# Patient Record
Sex: FEMALE | Race: BLACK OR AFRICAN AMERICAN | Employment: FULL TIME | URBAN - METROPOLITAN AREA
[De-identification: names, ages, dates, MRNs, and addresses within clinical notes are randomized per-mention and may not be internally consistent; named-entity substitution may affect disease eponyms.]

---

## 2017-01-18 ENCOUNTER — OFFICE VISIT (OUTPATIENT)
Dept: FAMILY MEDICINE CLINIC | Age: 52
End: 2017-01-18

## 2017-01-18 VITALS
BODY MASS INDEX: 43.55 KG/M2 | DIASTOLIC BLOOD PRESSURE: 86 MMHG | RESPIRATION RATE: 12 BRPM | WEIGHT: 245.8 LBS | HEART RATE: 90 BPM | OXYGEN SATURATION: 98 % | TEMPERATURE: 98.6 F | HEIGHT: 63 IN | SYSTOLIC BLOOD PRESSURE: 144 MMHG

## 2017-01-18 DIAGNOSIS — E78.00 PURE HYPERCHOLESTEROLEMIA: ICD-10-CM

## 2017-01-18 DIAGNOSIS — E66.01 OBESITY, CLASS III, BMI 40-49.9 (MORBID OBESITY) (HCC): ICD-10-CM

## 2017-01-18 DIAGNOSIS — I10 ESSENTIAL HYPERTENSION, BENIGN: Primary | ICD-10-CM

## 2017-01-18 DIAGNOSIS — G62.89 OTHER POLYNEUROPATHY: ICD-10-CM

## 2017-01-18 DIAGNOSIS — J45.21 MILD INTERMITTENT ASTHMA WITH ACUTE EXACERBATION: Chronic | ICD-10-CM

## 2017-01-18 RX ORDER — RANITIDINE 150 MG/1
150 TABLET, FILM COATED ORAL 2 TIMES DAILY
COMMUNITY
End: 2017-07-18 | Stop reason: SDUPTHER

## 2017-01-18 RX ORDER — LEVALBUTEROL INHALATION SOLUTION 1.25 MG/3ML
1.25 SOLUTION RESPIRATORY (INHALATION)
Qty: 3 ML | Refills: 0
Start: 2017-01-18 | End: 2017-01-18

## 2017-01-18 RX ORDER — AMLODIPINE BESYLATE 5 MG/1
5 TABLET ORAL DAILY
Qty: 90 TAB | Refills: 3 | Status: SHIPPED | OUTPATIENT
Start: 2017-01-18 | End: 2017-04-19 | Stop reason: SDUPTHER

## 2017-01-18 RX ORDER — ALBUTEROL SULFATE 90 UG/1
1-2 AEROSOL, METERED RESPIRATORY (INHALATION)
Qty: 1 INHALER | Refills: 2 | Status: SHIPPED | OUTPATIENT
Start: 2017-01-18

## 2017-01-18 RX ORDER — AA/PROT/LYSINE/METHIO/VIT C/B6 50-12.5 MG
TABLET ORAL
COMMUNITY
End: 2020-09-14 | Stop reason: ALTCHOICE

## 2017-01-18 RX ORDER — FLUTICASONE FUROATE AND VILANTEROL 100; 25 UG/1; UG/1
1 POWDER RESPIRATORY (INHALATION) DAILY
Qty: 1 INHALER | Refills: 5 | Status: SHIPPED | OUTPATIENT
Start: 2017-01-18 | End: 2020-04-20

## 2017-01-18 NOTE — PROGRESS NOTES
1. Have you been to the ER, urgent care clinic since your last visit? Hospitalized since your last visit? No    2. Have you seen or consulted any other health care providers outside of the [facility] since your last visit? Include any pap smears or colon screening.  Farida Coffman- twice weekly    Chief Complaint   Patient presents with    Hypertension    Cough     producing a cloudy white mucus    Leg Pain     left leg and both feet

## 2017-01-18 NOTE — PROGRESS NOTES
HISTORY OF PRESENT ILLNESS  Moshe Fleischer is a 46 y.o. female. HPI   Cardiovascular Review:  The patient has hypertension, hyperlipidemia and obesity. Diet and Lifestyle: generally follows a low fat low cholesterol diet, generally follows a low sodium diet, exercises sporadically, nonsmoker  Home BP Monitoring: is not measured at home. Pertinent ROS: taking medications as instructed, no medication side effects noted, no TIA's, no chest pain on exertion, no dyspnea on exertion, no swelling of ankles. Patient reports new onset of neuropathy of her feet. Patient seen routinely by GYN, Dr. Lewis Lechuga. Asthma  Patient presents for evaluation of wheezing, non-productive cough and chest tightness. The patient has been previously diagnosed with asthma. Symptoms began today. Observed precipitants include fumes and smoke. Patient does not have Albuterol inhaler. Review of Systems   Constitutional: Negative for weight loss. Cardiovascular: Negative for leg swelling. Gastrointestinal: Negative for constipation and diarrhea. Genitourinary: Positive for frequency and urgency. Musculoskeletal: Negative for joint pain and myalgias. Neurological: Negative for weakness. Physical Exam   Constitutional: She is oriented to person, place, and time. She appears well-developed and well-nourished. Neck: Normal range of motion. Neck supple. No JVD present. Carotid bruit is not present. No thyromegaly present. Cardiovascular: Normal rate, regular rhythm and intact distal pulses. Exam reveals no gallop and no friction rub. No murmur heard. Pulmonary/Chest: Effort normal. No respiratory distress. She has wheezes (expiratory). Musculoskeletal: She exhibits no edema. Lymphadenopathy:     She has no cervical adenopathy. Neurological: She is alert and oriented to person, place, and time. Psychiatric: She has a normal mood and affect.  Her behavior is normal.   Nursing note and vitals reviewed. ASSESSMENT and PLAN  Amber Mercedes was seen today for hypertension, cough and leg pain. Diagnoses and all orders for this visit:    Essential hypertension, benign  Borderline control. Continue home monitoring and call for reading >140/90  -     amLODIPine (NORVASC) 5 mg tablet; Take 1 Tab by mouth daily.  -     CBC W/O DIFF  -     METABOLIC PANEL, COMPREHENSIVE    Pure hypercholesterolemia  -     LIPID PANEL    Obesity, Class III, BMI 40-49.9 (morbid obesity) ([facility]  Discussed need for weight loss through diet and exercise. Reviewed decreased caloric intake and increased activity. Mild intermittent asthma with acute exacerbation  Xopenex treatment given in clinic with relief. Begin Breo daily. -     fluticasone-vilanterol (BREO ELLIPTA) 100-25 mcg/dose inhaler; Take 1 Puff by inhalation daily. -     albuterol (PROVENTIL HFA, VENTOLIN HFA, PROAIR HFA) 90 mcg/actuation inhaler; Take 1-2 Puffs by inhalation every four (4) hours as needed for Wheezing or Shortness of Breath. Other polyneuropathy ([facility]  -     HEMOGLOBIN A1C W/O EAG  -     VITAMIN B12      I have discussed the diagnosis with the patient and the intended plan as seen in the above orders. The patient has received an after-visit summary along with patient information handout. I have discussed medication side effects and warnings with the patient as well. Follow-up Disposition:  Return in about 6 months (around 7/18/2017) for cholesterol and asthma.

## 2017-01-18 NOTE — PATIENT INSTRUCTIONS

## 2017-01-18 NOTE — MR AVS SNAPSHOT
Visit Information Date & Time Provider Department Dept. Phone Encounter #  
 1/18/2017  2:30 PM Governor Jatin  [facility] [phone] [id number] Follow-up Instructions Return in about 6 months (around 7/18/2017) for cholesterol and asthma. Upcoming Health Maintenance Date Due  
 BREAST CANCER SCRN MAMMOGRAM 9/30/2015 PAP AKA CERVICAL CYTOLOGY 9/8/2017 DTaP/Tdap/Td series (2 - Td) 1/18/2021 COLONOSCOPY 11/11/2023 Allergies as of 1/18/2017  Review Complete On: 1/18/2017 By: Governor Jatin NP Severity Noted Reaction Type Reactions Latex  11/24/2014    Swelling Swelling in face when latex gloves were used at dentist office Other Food  01/30/2015    Itching Peas, raw broccoli, raw cauliflower, croutons Hydrocodone-acetaminophen High 02/04/2015   Side Effect Itching Ultracet [Tramadol-acetaminophen] High 01/05/2010    Swelling Facial and mouth Biaxin [Clarithromycin]    Itching, Nausea Only Carrot  01/30/2015    Itching Codeine  09/18/2012    Itching Dilaudid [Hydromorphone]  09/07/2012    Itching, Nausea Only Flexeril [Cyclobenzaprine]  02/29/2012    Itching Naproxen    Rash Nucynta [Tapentadol]  02/13/2015   Side Effect Itching Oxycodone  01/30/2015    Swelling Swelling of hands Pcn [Penicillins]    Rash Peanut  01/30/2015    Shortness of Breath, Swelling All nuts Red Dye  01/30/2015    Itching, Swelling #9 Shellfish Derived  01/30/2015    Swelling  
 shrimp Strawberry  01/30/2015    Itching Tramadol  02/29/2012    Hives Current Immunizations  Reviewed on 7/13/2016 Name Date Influenza Vaccine 11/3/2014 TDAP Vaccine 1/18/2011 Varicella Virus Vaccine 10/1/2013 Not reviewed this visit You Were Diagnosed With   
  
 Codes Comments Essential hypertension, benign    -  Primary ICD-10-CM: I10 
ICD-9-CM: 401.1 Pure hypercholesterolemia     ICD-10-CM: E78.00 ICD-9-CM: 272.0 Obesity, Class III, BMI 40-49.9 (morbid obesity) (HCC)     ICD-10-CM: E66.01 
ICD-9-CM: 278.01 Mild intermittent asthma with acute exacerbation     ICD-10-CM: J45.21 ICD-9-CM: 534.07 Other polyneuropathy ([facility]     ICD-10-CM: G62.89 Vitals BP Pulse Temp Resp Height(growth percentile) Weight(growth percentile) 144/86 (BP 1 Location: Left arm, BP Patient Position: Sitting) 90 98.6 °F (37 °C) (Oral) 12 5' 3\" (1.6 m) 245 lb 12.8 oz (111.5 kg) SpO2 BMI OB Status Smoking Status 98% 43.54 kg/m2 IUD Never Smoker Vitals History BMI and BSA Data Body Mass Index Body Surface Area 43.54 kg/m 2 2.23 m 2 Preferred Pharmacy Pharmacy Name Phone [facility] [address] [phone] Your Updated Medication List  
  
   
This list is accurate as of: 1/18/17  3:21 PM.  Always use your most recent med list.  
  
  
  
  
 albuterol 90 mcg/actuation inhaler Commonly known as:  PROVENTIL HFA, VENTOLIN HFA, PROAIR HFA Take 1-2 Puffs by inhalation every four (4) hours as needed for Wheezing or Shortness of Breath. ALLER-FERNANDA 10 mg tablet Generic drug:  cetirizine Take 10 mg by mouth daily. amLODIPine 5 mg tablet Commonly known as:  Hein Zaheer Take 1 Tab by mouth daily. aspirin 81 mg tablet Take 81 mg by mouth daily. CO Q-10 10 mg Cap Generic drug:  coenzyme q10 Take  by mouth. EPIPEN 2-WAYNE 0.3 mg/0.3 mL injection Generic drug:  EPINEPHrine  
0.3 mL by IntraMUSCular route once as needed for up to 1 dose. fluticasone-vilanterol 100-25 mcg/dose inhaler Commonly known as:  BREO ELLIPTA Take 1 Puff by inhalation daily. levalbuterol 1.25 mg/3 mL Nebu Commonly known as:  XOPENEX  
3 mL by Nebulization route now for 1 dose. multivitamin tablet Commonly known as:  ONE A DAY  
 Take 1 Tab by mouth daily. omeprazole 20 mg tablet Commonly known as:  PRILOSEC OTC Take 20 mg by mouth daily. solifenacin 5 mg tablet Commonly known as:  Lebanese Jailyn Take 1 Tab by mouth daily. For bladder  Indications: URINARY URGE INCONTINENCE  
  
 VITAMIN C 500 mg tablet Generic drug:  ascorbic acid (vitamin C) Take 500 mg by mouth daily. VITAMIN D3 1,000 unit Cap Generic drug:  cholecalciferol Take 1 capsule by mouth daily. ZANTAC 150 mg tablet Generic drug:  raNITIdine Take 150 mg by mouth two (2) times a day. zinc 15 mg Tab Take  by mouth daily. Prescriptions Sent to Pharmacy Refills  
 amLODIPine (NORVASC) 5 mg tablet 3 Sig: Take 1 Tab by mouth daily. Class: Normal  
 Pharmacy: [address] Ph #: [phone] Route: Oral  
 fluticasone-vilanterol (BREO ELLIPTA) 100-25 mcg/dose inhaler 5 Sig: Take 1 Puff by inhalation daily. Class: Normal  
 Pharmacy: [address] Ph #: [phone] Route: Inhalation  
 albuterol (PROVENTIL HFA, VENTOLIN HFA, PROAIR HFA) 90 mcg/actuation inhaler 2 Sig: Take 1-2 Puffs by inhalation every four (4) hours as needed for Wheezing or Shortness of Breath. Class: Normal  
 Pharmacy: [address] Ph #: [phone] Route: Inhalation We Performed the Following CBC W/O DIFF [29426 CPT(R)] HEMOGLOBIN A1C W/O EAG [53081 CPT(R)] LEVALBUTEROL, INHAL. SOL., FDA-APPROVED FINAL, NON-COMPOUND UNIT DOSE, 0.5 MG [ [facility]] LIPID PANEL [37879 CPT(R)] METABOLIC PANEL, COMPREHENSIVE [51835 CPT(R)] SC INHAL RX, AIRWAY OBST/DX SPUTUM INDUCT [id number] CPT(R)] VITAMIN B12 [id number] CPT(R)] Follow-up Instructions Return in about 6 months (around 7/18/2017) for cholesterol and asthma. Patient Instructions Asthma Attack: Care Instructions Your Care Instructions During an asthma attack, the airways swell and narrow. This makes it hard to breathe. Severe asthma attacks can be life-threatening, but you can help prevent them by keeping your asthma under control and treating symptoms before they get bad. Symptoms include being short of breath, having chest tightness, coughing, and wheezing. Noting and treating these symptoms can also help you avoid future trips to the emergency room. The doctor has checked you carefully, but problems can develop later. If you notice any problems or new symptoms, get medical treatment right away. Follow-up care is a key part of your treatment and safety. Be sure to make and go to all appointments, and call your doctor if you are having problems. It's also a good idea to know your test results and keep a list of the medicines you take. How can you care for yourself at home? · Follow your asthma action plan to prevent and treat attacks. If you don't have an asthma action plan, work with your doctor to create one. · Take your asthma medicines exactly as prescribed. Talk to your doctor right away if you have any questions about how to take them. ¨ Use your quick-relief medicine when you have symptoms of an attack. Quick-relief medicine is usually an albuterol inhaler. Some people need to use quick-relief medicine before they exercise. ¨ Take your controller medicine every day, not just when you have symptoms. Controller medicine is usually an inhaled corticosteroid. The goal is to prevent problems before they occur. Don't use your controller medicine to treat an attack that has already started. It doesn't work fast enough to help. ¨ If your doctor prescribed corticosteroid pills to use during an attack, take them exactly as prescribed. It may take hours for the pills to work, but they may make the episode shorter and help you breathe better. ¨ Keep your quick-relief medicine with you at all times. · Talk to your doctor before using other medicines. Some medicines, such as aspirin, can cause asthma attacks in some people. · If you have a peak flow meter, use it to check how well you are breathing. This can help you predict when an asthma attack is going to occur. Then you can take medicine to prevent the asthma attack or make it less severe. · Do not smoke or allow others to smoke around you. Avoid smoky places. Smoking makes asthma worse. If you need help quitting, talk to your doctor about stop-smoking programs and medicines. These can increase your chances of quitting for good. · Learn what triggers an asthma attack for you, and avoid the triggers when you can. Common triggers include colds, smoke, air pollution, dust, pollen, mold, pets, cockroaches, stress, and cold air. · Avoid colds and the flu. Get a pneumococcal vaccine shot. If you have had one before, ask your doctor if you need a second dose. Get a flu vaccine every fall. If you must be around people with colds or the flu, wash your hands often. When should you call for help? Call 911 anytime you think you may need emergency care. For example, call if: 
· You have severe trouble breathing. Call your doctor now or seek immediate medical care if: 
· Your symptoms do not get better after you have followed your asthma action plan. · You have new or worse trouble breathing. · Your coughing and wheezing get worse. · You cough up dark brown or bloody mucus (sputum). · You have a new or higher fever. Watch closely for changes in your health, and be sure to contact your doctor if: 
· You need to use quick-relief medicine on more than 2 days a week (unless it is just for exercise). · You cough more deeply or more often, especially if you notice more mucus or a change in the color of your mucus. · You are not getting better as expected. Where can you learn more? Go to http://cinda-jaime.info/. Enter F857 in the search box to learn more about \"Asthma Attack: Care Instructions. \" Current as of: May 23, 2016 Content Version: 11.1 © 8089-0999 [employer]. Care instructions adapted under license by [employer] (which disclaims liability or warranty for this information). If you have questions about a medical condition or this instruction, always ask your healthcare professional. [address] any warranty or liability for your use of this information. Introducing [facility]! Dear Emily Lowe: Thank you for requesting a [employer] account. Our records indicate that you have previously registered for a [employer] account but its currently inactive. Please call our [employer] support line at [phone]. Additional Information If you have questions, please visit the Frequently Asked Questions section of the [employer] website at [email or web address] Remember, [employer] is NOT to be used for urgent needs. For medical emergencies, dial 911. Now available from your iPhone and Android! Please provide this summary of care documentation to your next provider. If you have any questions after today's visit, please call [phone].

## 2017-01-19 LAB
ALBUMIN SERPL-MCNC: 4.7 G/DL (ref 3.5–5.5)
ALBUMIN/GLOB SERPL: 1.8 {RATIO} (ref 1.1–2.5)
ALP SERPL-CCNC: 132 IU/L (ref 39–117)
ALT SERPL-CCNC: 18 IU/L (ref 0–32)
AST SERPL-CCNC: 21 IU/L (ref 0–40)
BILIRUB SERPL-MCNC: 0.3 MG/DL (ref 0–1.2)
BUN SERPL-MCNC: 12 MG/DL (ref 6–24)
BUN/CREAT SERPL: 20 (ref 9–23)
CALCIUM SERPL-MCNC: 9.9 MG/DL (ref 8.7–10.2)
CHLORIDE SERPL-SCNC: 97 MMOL/L (ref 96–106)
CHOLEST SERPL-MCNC: 225 MG/DL (ref 100–199)
CO2 SERPL-SCNC: 28 MMOL/L (ref 18–29)
CREAT SERPL-MCNC: 0.6 MG/DL (ref 0.57–1)
ERYTHROCYTE [DISTWIDTH] IN BLOOD BY AUTOMATED COUNT: 14.9 % (ref 12.3–15.4)
GLOBULIN SER CALC-MCNC: 2.6 G/DL (ref 1.5–4.5)
GLUCOSE SERPL-MCNC: 85 MG/DL (ref 65–99)
HBA1C MFR BLD: 5.8 % (ref 4.8–5.6)
HCT VFR BLD AUTO: 37 % (ref 34–46.6)
HDLC SERPL-MCNC: 67 MG/DL
HGB BLD-MCNC: 12.2 G/DL (ref 11.1–15.9)
INTERPRETATION, 910389: NORMAL
LDLC SERPL CALC-MCNC: 142 MG/DL (ref 0–99)
MCH RBC QN AUTO: 27.5 PG (ref 26.6–33)
MCHC RBC AUTO-ENTMCNC: 33 G/DL (ref 31.5–35.7)
MCV RBC AUTO: 83 FL (ref 79–97)
PLATELET # BLD AUTO: 311 X10E3/UL (ref 150–379)
POTASSIUM SERPL-SCNC: 4.2 MMOL/L (ref 3.5–5.2)
PROT SERPL-MCNC: 7.3 G/DL (ref 6–8.5)
RBC # BLD AUTO: 4.44 X10E6/UL (ref 3.77–5.28)
SODIUM SERPL-SCNC: 140 MMOL/L (ref 134–144)
TRIGL SERPL-MCNC: 81 MG/DL (ref 0–149)
VIT B12 SERPL-MCNC: 547 PG/ML (ref 211–946)
VLDLC SERPL CALC-MCNC: 16 MG/DL (ref 5–40)
WBC # BLD AUTO: 6.7 X10E3/UL (ref 3.4–10.8)

## 2017-01-23 ENCOUNTER — OFFICE VISIT (OUTPATIENT)
Dept: FAMILY MEDICINE CLINIC | Age: 52
End: 2017-01-23

## 2017-01-23 VITALS
WEIGHT: 241.4 LBS | HEART RATE: 81 BPM | TEMPERATURE: 98.3 F | OXYGEN SATURATION: 98 % | RESPIRATION RATE: 18 BRPM | DIASTOLIC BLOOD PRESSURE: 66 MMHG | HEIGHT: 63 IN | BODY MASS INDEX: 42.77 KG/M2 | SYSTOLIC BLOOD PRESSURE: 112 MMHG

## 2017-01-23 DIAGNOSIS — R05.9 COUGH: Primary | ICD-10-CM

## 2017-01-23 DIAGNOSIS — J02.9 SORE THROAT: ICD-10-CM

## 2017-01-23 DIAGNOSIS — R19.7 DIARRHEA, UNSPECIFIED TYPE: ICD-10-CM

## 2017-01-23 RX ORDER — FLUTICASONE PROPIONATE AND SALMETEROL 250; 50 UG/1; UG/1
1 POWDER RESPIRATORY (INHALATION) EVERY 12 HOURS
Qty: 1 INHALER | Refills: 0 | Status: SHIPPED | OUTPATIENT
Start: 2017-01-23 | End: 2018-02-20

## 2017-01-23 RX ORDER — LOPERAMIDE HCL 2 MG
2 TABLET ORAL
Qty: 10 TAB | Refills: 0 | Status: SHIPPED | OUTPATIENT
Start: 2017-01-23 | End: 2017-02-02

## 2017-01-23 RX ORDER — DOXYCYCLINE 100 MG/1
100 TABLET ORAL 2 TIMES DAILY
Qty: 20 TAB | Refills: 0 | Status: SHIPPED | OUTPATIENT
Start: 2017-01-23 | End: 2017-02-02

## 2017-01-23 NOTE — MR AVS SNAPSHOT
Visit Information Date & Time Provider Department Dept. Phone Encounter #  
 1/23/2017  2:45 PM Tomy Davalos, [address] [phone] [id number] Your Appointments 7/18/2017  4:00 PM  
ROUTINE CARE with Adán Ramos  [facility] [address] ([address]) Appt Note: [address] [address]  
[phone]  
  
   
 [address] [address] Upcoming Health Maintenance Date Due  
 BREAST CANCER SCRN MAMMOGRAM 9/30/2015 PAP AKA CERVICAL CYTOLOGY 9/8/2017 DTaP/Tdap/Td series (2 - Td) 1/18/2021 COLONOSCOPY 11/11/2023 Allergies as of 1/23/2017  Review Complete On: 1/23/2017 By: Tomy Davalos NP Severity Noted Reaction Type Reactions Latex  11/24/2014    Swelling Swelling in face when latex gloves were used at dentist office Other Food  01/30/2015    Itching Peas, raw broccoli, raw cauliflower, croutons Hydrocodone-acetaminophen High 02/04/2015   Side Effect Itching Ultracet [Tramadol-acetaminophen] High 01/05/2010    Swelling Facial and mouth Biaxin [Clarithromycin]    Itching, Nausea Only Carrot  01/30/2015    Itching Codeine  09/18/2012    Itching Dilaudid [Hydromorphone]  09/07/2012    Itching, Nausea Only Flexeril [Cyclobenzaprine]  02/29/2012    Itching Naproxen    Rash Nucynta [Tapentadol]  02/13/2015   Side Effect Itching Oxycodone  01/30/2015    Swelling Swelling of hands Pcn [Penicillins]    Rash Peanut  01/30/2015    Shortness of Breath, Swelling All nuts Red Dye  01/30/2015    Itching, Swelling #9 Shellfish Derived  01/30/2015    Swelling  
 shrimp Strawberry  01/30/2015    Itching Tramadol  02/29/2012    Hives Current Immunizations  Reviewed on 7/13/2016 Name Date Influenza Vaccine 11/3/2014 TDAP Vaccine 1/18/2011 Varicella Virus Vaccine 10/1/2013 Not reviewed this visit You Were Diagnosed With   
  
 Codes Comments Sore throat    -  Primary ICD-10-CM: J02.9 ICD-9-CM: 481 Cough     ICD-10-CM: R05 ICD-9-CM: 947. 2 Vitals BP Pulse Temp Resp Height(growth percentile) Weight(growth percentile) 112/66 (BP 1 Location: Left arm, BP Patient Position: Sitting) 81 98.3 °F (36.8 °C) (Oral) 18 5' 3\" (1.6 m) 241 lb 6.4 oz (109.5 kg) SpO2 BMI OB Status Smoking Status 98% 42.76 kg/m2 IUD Never Smoker Vitals History BMI and BSA Data Body Mass Index Body Surface Area 42.76 kg/m 2 2.21 m 2 Preferred Pharmacy Pharmacy Name Phone [facility] # [address] [place] [phone] Your Updated Medication List  
  
   
This list is accurate as of: 1/23/17  3:25 PM.  Always use your most recent med list.  
  
  
  
  
 albuterol 90 mcg/actuation inhaler Commonly known as:  PROVENTIL HFA, VENTOLIN HFA, PROAIR HFA Take 1-2 Puffs by inhalation every four (4) hours as needed for Wheezing or Shortness of Breath. ALLER-FERNANDA 10 mg tablet Generic drug:  cetirizine Take 10 mg by mouth daily. amLODIPine 5 mg tablet Commonly known as:  Wander Chafe Take 1 Tab by mouth daily. aspirin 81 mg tablet Take 81 mg by mouth daily. CO Q-10 10 mg Cap Generic drug:  coenzyme q10 Take  by mouth. doxycycline 100 mg tablet Commonly known as:  ADOXA Take 1 Tab by mouth two (2) times a day for 10 days. EPIPEN 2-WAYNE 0.3 mg/0.3 mL injection Generic drug:  EPINEPHrine  
0.3 mL by IntraMUSCular route once as needed for up to 1 dose. fluticasone-salmeterol 250-50 mcg/dose diskus inhaler Commonly known as:  ADVAIR Take 1 Puff by inhalation every twelve (12) hours. fluticasone-vilanterol 100-25 mcg/dose inhaler Commonly known as:  BREO ELLIPTA Take 1 Puff by inhalation daily. multivitamin tablet Commonly known as:  ONE A DAY  
 Take 1 Tab by mouth daily. omeprazole 20 mg tablet Commonly known as:  PRILOSEC OTC Take 20 mg by mouth daily. solifenacin 5 mg tablet Commonly known as:  Samantha Diaz Take 1 Tab by mouth daily. For bladder  Indications: URINARY URGE INCONTINENCE  
  
 VITAMIN C 500 mg tablet Generic drug:  ascorbic acid (vitamin C) Take 500 mg by mouth daily. VITAMIN D3 1,000 unit Cap Generic drug:  cholecalciferol Take 1 capsule by mouth daily. ZANTAC 150 mg tablet Generic drug:  raNITIdine Take 150 mg by mouth two (2) times a day. zinc 15 mg Tab Take  by mouth daily. Prescriptions Sent to Pharmacy Refills  
 doxycycline (ADOXA) 100 mg tablet 0 Sig: Take 1 Tab by mouth two (2) times a day for 10 days. Class: Normal  
 Pharmacy: [place] [address], [facility] #: [phone] Route: Oral  
 fluticasone-salmeterol (ADVAIR) 250-50 mcg/dose diskus inhaler 0 Sig: Take 1 Puff by inhalation every twelve (12) hours. Class: Normal  
 Pharmacy: [address] [facility] Ph #: [phone] Route: Inhalation We Performed the Following AMB POC RAPID STREP A [56385 CPT(R)] Introducing [facility] SERVICES! [facility] introduces [employer] patient portal. Now you can access parts of your medical record, email your doctor's office, and request medication refills online. 1. In your internet browser, go to [email or web address] 2. Click on the First Time User? Click Here link in the Sign In box. You will see the New Member Sign Up page. 3. Enter your [employer] Access Code exactly as it appears below. You will not need to use this code after youve completed the sign-up process. If you do not sign up before the expiration date, you must request a new code. · [employer] Access Code: [email or web address] Expires: 4/23/2017  3:10 PM 
 
 4. Enter the last four digits of your Social Security Number (xxxx) and Date of Birth (mm/dd/yyyy) as indicated and click Submit. You will be taken to the next sign-up page. 5. Create a [employer] ID. This will be your [employer] login ID and cannot be changed, so think of one that is secure and easy to remember. 6. Create a [employer] password. You can change your password at any time. 7. Enter your Password Reset Question and Answer. This can be used at a later time if you forget your password. 8. Enter your e-mail address. You will receive e-mail notification when new information is available in [address] 9. Click Sign Up. You can now view and download portions of your medical record. 10. Click the Download Summary menu link to download a portable copy of your medical information. If you have questions, please visit the Frequently Asked Questions section of the [employer] website. Remember, [employer] is NOT to be used for urgent needs. For medical emergencies, dial 911. Now available from your iPhone and Android! Please provide this summary of care documentation to your next provider. If you have any questions after today's visit, please call [phone].

## 2017-01-23 NOTE — PROGRESS NOTES
HISTORY OF PRESENT ILLNESS  Nelda Ayala is a 46 y.o. female. HPI:Pt reports sore throat, headache, tinnitus, productive, cough yellow sputum and wheezing for several days. She was seen on 2017 and given ProAir and Breo, but she hasn't picked up her medication. She ha sbeen using albuterol by nebulizer at home. She started having diarrhea two days ago, but denies nausea and abdominal pain. Past Medical History   Diagnosis Date    Abscess 2012    Allergic rhinitis     Angioedema     Asthma     Back pain 2009    GERD (gastroesophageal reflux disease)     Hypertension      Past Surgical History   Procedure Laterality Date    Hx other surgical  2012     boil(buttock) excised by Dr Kelsey Cohn Hx breast biopsy Right      BENIGN    Hx orthopaedic Bilateral      carpal tunnel    Hx gyn  , 2002         Hx dilation and curettage      Hx gi       COLONOSCOPY    Hx other surgical  2/4/15     Excision of left buttock abscess by Dr. Hector Goldstein     Allergies   Allergen Reactions    Latex Swelling     Swelling in face when latex gloves were used at dentist office    Other Food Itching     Peas, raw broccoli, raw cauliflower, croutons    Hydrocodone-Acetaminophen Itching    Ultracet [Tramadol-Acetaminophen] Swelling     Facial and mouth    Biaxin [Clarithromycin] Itching and Nausea Only    Carrot Itching    Codeine Itching    Dilaudid [Hydromorphone] Itching and Nausea Only    Flexeril [Cyclobenzaprine] Itching    Naproxen Rash    Nucynta [Tapentadol] Itching    Oxycodone Swelling     Swelling of hands    Pcn [Penicillins] Rash    Peanut Shortness of Breath and Swelling     All nuts    Red Dye Itching and Swelling     #9    Shellfish Derived Swelling     shrimp    Strawberry Itching    Tramadol Hives     Current Outpatient Prescriptions:     doxycycline (ADOXA) 100 mg tablet, Take 1 Tab by mouth two (2) times a day for 10 days. , Disp: 20 Tab, Rfl: 0   fluticasone-salmeterol (ADVAIR) 250-50 mcg/dose diskus inhaler, Take 1 Puff by inhalation every twelve (12) hours. , Disp: 1 Inhaler, Rfl: 0    loperamide (IMODIUM A-D) 2 mg tablet, Take 1 Tab by mouth four (4) times daily as needed for Diarrhea for up to 10 days. , Disp: 10 Tab, Rfl: 0    raNITIdine (ZANTAC) 150 mg tablet, Take 150 mg by mouth two (2) times a day., Disp: , Rfl:     coenzyme q10 (CO Q-10) 10 mg cap, Take  by mouth., Disp: , Rfl:     amLODIPine (NORVASC) 5 mg tablet, Take 1 Tab by mouth daily. , Disp: 90 Tab, Rfl: 3    albuterol (PROVENTIL HFA, VENTOLIN HFA, PROAIR HFA) 90 mcg/actuation inhaler, Take 1-2 Puffs by inhalation every four (4) hours as needed for Wheezing or Shortness of Breath., Disp: 1 Inhaler, Rfl: 2    solifenacin (VESICARE) 5 mg tablet, Take 1 Tab by mouth daily. For bladder  Indications: URINARY URGE INCONTINENCE, Disp: 30 Tab, Rfl: 5    omeprazole (PRILOSEC OTC) 20 mg tablet, Take 20 mg by mouth daily. , Disp: , Rfl:     EPIPEN 2-WAYNE 0.3 mg/0.3 mL (1:1,000) injection, 0.3 mL by IntraMUSCular route once as needed for up to 1 dose., Disp: 2 Each, Rfl: 3    zinc 15 mg tab, Take  by mouth daily. , Disp: , Rfl:     cetirizine (ALLER-FERNANDA) 10 mg tablet, Take 10 mg by mouth daily. , Disp: , Rfl:     aspirin 81 mg tablet, Take 81 mg by mouth daily. , Disp: , Rfl:     ascorbic acid (VITAMIN C) 500 mg tablet, Take 500 mg by mouth daily. , Disp: , Rfl:     Cholecalciferol, Vitamin D3, (VITAMIN D3) 1,000 unit Cap, Take 1 capsule by mouth daily. , Disp: , Rfl:     multivitamin (ONE A DAY) tablet, Take 1 Tab by mouth daily. , Disp: , Rfl:     fluticasone-vilanterol (BREO ELLIPTA) 100-25 mcg/dose inhaler, Take 1 Puff by inhalation daily. , Disp: 1 Inhaler, Rfl: 5  Review of Systems   Constitutional: Negative. HENT: Positive for sore throat and tinnitus. Respiratory: Positive for cough, sputum production and wheezing. Negative for hemoptysis and shortness of breath.     Cardiovascular: Negative. Gastrointestinal: Positive for diarrhea. Negative for abdominal pain, blood in stool, constipation, heartburn, melena, nausea and vomiting. Blood pressure 112/66, pulse 81, temperature 98.3 °F (36.8 °C), temperature source Oral, resp. rate 18, height 5' 3\" (1.6 m), weight 241 lb 6.4 oz (109.5 kg), SpO2 98 %. Physical Exam   Constitutional: No distress. HENT:   Unable to visualize TM s due to cerumen impaction  Throat red, no exudate  Quick strep is negative   Neck: Neck supple. Cardiovascular: Normal rate and regular rhythm. No murmur heard. Pulmonary/Chest: She has wheezes. She has no rales. She exhibits no tenderness. Exp. wheezing   Abdominal: Soft. Bowel sounds are normal. She exhibits no distension and no mass. There is no tenderness. There is no rebound and no guarding. Nursing note and vitals reviewed. ASSESSMENT and PLAN    ICD-10-CM ICD-9-CM    1. Cough R05 786.2 fluticasone-salmeterol (ADVAIR) 250-50 mcg/dose diskus inhaler   2. Sore throat J02.9 462 AMB POC RAPID STREP A      doxycycline (ADOXA) 100 mg tablet   3.  Diarrhea, unspecified type R19.7 787.91 loperamide (IMODIUM A-D) 2 mg tablet   Pt was given an after visit summary which includes diagnosis, current medicines and vital and voiced understanding of treatment plan

## 2017-01-23 NOTE — LETTER
NOTIFICATION RETURN TO WORK / SCHOOL 
 
1/23/2017 3:39 PM 
 
Ms. Shaylee Cho [address] [place] [address] To Whom It May Concern: 
 
Shaylee Cho is currently under the care of CHANTEL Waters. She will be out of work from 01/23/17-01/28/17 If there are questions or concerns please have the patient contact our office. Sincerely, Bao Hicks NP

## 2017-01-23 NOTE — PROGRESS NOTES
1. Have you been to the ER, urgent care clinic since your last visit? Hospitalized since your last visit? No    2. Have you seen or consulted any other health care providers outside of the [address] [place] [address] since your last visit? Include any pap smears or colon screening.  No     Chief Complaint   Patient presents with    Cough     pt c/o hurtst to cough    Sore Throat     pt c/o hurts to swallow drank tea and water to no relief    Diarrhea     pt c/o diarrhea x2days    Headache     pt c/o headache and ringing in ear       Learning Assessment 1/18/2017   PRIMARY LEARNER Patient   HIGHEST LEVEL OF EDUCATION - PRIMARY LEARNER  GRADUATED HIGH SCHOOL OR GED   BARRIERS PRIMARY LEARNER NONE   CO-LEARNER CAREGIVER No   PRIMARY LANGUAGE ENGLISH   LEARNER PREFERENCE PRIMARY OTHER (COMMENT)   ANSWERED BY patient   RELATIONSHIP SELF

## 2017-01-23 NOTE — LETTER
NOTIFICATION RETURN TO WORK / SCHOOL 
 
1/23/2017 3:34 PM 
 
Ms. Taya Goodwin [address] [facility] [address] [place] [address] To Whom It May Concern: 
 
Taya Goodwin is currently under the care of CHANTEL Waters. Please excuse Ms Taya Goodwin from work 1/23-1/27/2017. If there are questions or concerns please have the patient contact our office. Sincerely, Delmi Lynn NP

## 2017-01-24 LAB
S PYO AG THROAT QL: NEGATIVE
VALID INTERNAL CONTROL?: YES

## 2017-02-03 RX ORDER — IBUPROFEN 800 MG/1
800 TABLET ORAL
Qty: 30 TAB | Refills: 0 | Status: SHIPPED | OUTPATIENT
Start: 2017-02-03 | End: 2018-11-05 | Stop reason: SDUPTHER

## 2017-02-18 ENCOUNTER — OFFICE VISIT (OUTPATIENT)
Dept: FAMILY MEDICINE CLINIC | Age: 52
End: 2017-02-18

## 2017-02-18 VITALS
TEMPERATURE: 98.1 F | HEART RATE: 73 BPM | RESPIRATION RATE: 16 BRPM | SYSTOLIC BLOOD PRESSURE: 118 MMHG | DIASTOLIC BLOOD PRESSURE: 62 MMHG | OXYGEN SATURATION: 95 %

## 2017-02-18 DIAGNOSIS — M19.90 ARTHRITIS: Primary | ICD-10-CM

## 2017-02-18 RX ORDER — ACETAMINOPHEN 325 MG/1
TABLET ORAL
COMMUNITY
End: 2020-02-14 | Stop reason: SDUPTHER

## 2017-02-18 NOTE — LETTER
NOTIFICATION RETURN TO WORK / SCHOOL 
 
2/18/2017 3:32 PM 
 
Ms. Aníbal Smith [address] [facility] [address] [place] [address] To Whom It May Concern: 
 
 
Aníbal Smith is currently under the care of CHANTEL Waters. Please excuse her from work 2/18/17 If there are questions or concerns please have the patient contact our office. Sincerely, Heather Moreira NP

## 2017-02-18 NOTE — PROGRESS NOTES
Chief Complaint   Patient presents with    Knee Pain     left states feels like there is a band in knee and unable to move it that started this morning.  800mg Ibuprofen taken tylenol taken @ 0630    Ankle Pain     right ankle 8/10 pain, increased pain when trying to walk     Requesting that any Rx be printed  Med list reviewed  \"REVIEWED RECORD IN PREPARATION FOR VISIT AND HAVE OBTAINED THE NECESSARY DOCUMENTATION\"

## 2017-02-18 NOTE — PROGRESS NOTES
HISTORY OF PRESENT ILLNESS  Mallorie Chand is a 46 y.o. female. HPI  Patient presents to clinic today for noted joint pain, left knee pain, right ankle pain that began this morning upon awakening. 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 Today she is in a wheelchair. Review of Systems   Constitutional: Negative for chills, fever and weight loss. HENT: Negative for ear pain and hearing loss. Eyes: Negative for blurred vision and double vision. Respiratory: Negative. Cardiovascular: Negative for chest pain, palpitations and leg swelling. Gastrointestinal: Negative for abdominal pain, constipation, diarrhea, heartburn and vomiting. Genitourinary: Negative. Musculoskeletal: Positive for myalgias. Skin: Negative for rash. Neurological: Negative. Negative for headaches. Psychiatric/Behavioral: Negative. Physical Exam   Constitutional: She is oriented to person, place, and time. She appears well-developed and well-nourished. HENT:   Head: Normocephalic and atraumatic. Eyes: Pupils are equal, round, and reactive to light. Neck: Normal range of motion. Neck supple. Cardiovascular: Normal rate, regular rhythm and normal heart sounds. Pulmonary/Chest: Effort normal and breath sounds normal.   Abdominal: Soft. Bowel sounds are normal.   Musculoskeletal:   Decrease ROM left wrist    Neurological: She is alert and oriented to person, place, and time. Skin: Skin is warm and dry. Psychiatric:   Flat affect   Nursing note and vitals reviewed. ASSESSMENT and PLAN  Arthritis- suggest PT, Tylenol arthritis for pain 2/2 multiple drug allergies, advised to lose weight. Provided work note for today.    Hyperlipidemia- noted elevated TC, encouraged TLC therapeutic life style changes, follow up with PCP regarding anti lipidemics

## 2017-02-20 ENCOUNTER — OFFICE VISIT (OUTPATIENT)
Dept: FAMILY MEDICINE CLINIC | Age: 52
End: 2017-02-20

## 2017-02-20 VITALS
BODY MASS INDEX: 42.7 KG/M2 | HEART RATE: 81 BPM | HEIGHT: 63 IN | RESPIRATION RATE: 18 BRPM | TEMPERATURE: 97.2 F | WEIGHT: 241 LBS | DIASTOLIC BLOOD PRESSURE: 73 MMHG | OXYGEN SATURATION: 98 % | SYSTOLIC BLOOD PRESSURE: 130 MMHG

## 2017-02-20 DIAGNOSIS — M25.50 ARTHRALGIA, UNSPECIFIED JOINT: Primary | ICD-10-CM

## 2017-02-20 NOTE — PROGRESS NOTES
HISTORY OF PRESENT ILLNESS  Sarah Collier is a 46 y.o. female. HPI: Pt is following up from 17 for generalized joint pain. She was told she may have rheumatoid arthritis and that she needs to follow up with PCP for arthritis work up. At work she is on he feet all day in a hard floor. She is taking tylenol arthritis without help.   Past Medical History   Diagnosis Date    Abscess 2012    Allergic rhinitis     Angioedema     Asthma     Back pain 2009    GERD (gastroesophageal reflux disease)     Hypertension      Past Surgical History   Procedure Laterality Date    Hx other surgical  2012     boil(buttock) excised by Dr Jose Ramon Cameron Hx breast biopsy Right      BENIGN    Hx orthopaedic Bilateral      carpal tunnel    Hx gyn  , 2002         Hx dilation and curettage      Hx gi       COLONOSCOPY    Hx other surgical  2/4/15     Excision of left buttock abscess by Dr. Lis Cornell     Allergies   Allergen Reactions    Latex Swelling     Swelling in face when latex gloves were used at dentist office    Other Food Itching     Peas, raw broccoli, raw cauliflower, croutons    Hydrocodone-Acetaminophen Itching    Ultracet [Tramadol-Acetaminophen] Swelling     Facial and mouth    Biaxin [Clarithromycin] Itching and Nausea Only    Carrot Itching    Codeine Itching    Dilaudid [Hydromorphone] Itching and Nausea Only    Flexeril [Cyclobenzaprine] Itching    Naproxen Rash    Nucynta [Tapentadol] Itching    Oxycodone Swelling     Swelling of hands    Pcn [Penicillins] Rash    Peanut Shortness of Breath and Swelling     All nuts    Red Dye Itching and Swelling     #9    Shellfish Derived Swelling     shrimp    Strawberry Itching    Tramadol Hives     Current Outpatient Prescriptions:     acetaminophen (TYLENOL) 325 mg tablet, Take  by mouth every four (4) hours as needed for Pain., Disp: , Rfl:     ibuprofen (MOTRIN) 800 mg tablet, Take 1 Tab by mouth every eight (8) hours as needed for Pain., Disp: 30 Tab, Rfl: 0    fluticasone-salmeterol (ADVAIR) 250-50 mcg/dose diskus inhaler, Take 1 Puff by inhalation every twelve (12) hours. , Disp: 1 Inhaler, Rfl: 0    raNITIdine (ZANTAC) 150 mg tablet, Take 150 mg by mouth two (2) times a day., Disp: , Rfl:     coenzyme q10 (CO Q-10) 10 mg cap, Take  by mouth., Disp: , Rfl:     amLODIPine (NORVASC) 5 mg tablet, Take 1 Tab by mouth daily. , Disp: 90 Tab, Rfl: 3    fluticasone-vilanterol (BREO ELLIPTA) 100-25 mcg/dose inhaler, Take 1 Puff by inhalation daily. , Disp: 1 Inhaler, Rfl: 5    albuterol (PROVENTIL HFA, VENTOLIN HFA, PROAIR HFA) 90 mcg/actuation inhaler, Take 1-2 Puffs by inhalation every four (4) hours as needed for Wheezing or Shortness of Breath., Disp: 1 Inhaler, Rfl: 2    omeprazole (PRILOSEC OTC) 20 mg tablet, Take 20 mg by mouth daily. , Disp: , Rfl:     EPIPEN 2-WAYNE 0.3 mg/0.3 mL (1:1,000) injection, 0.3 mL by IntraMUSCular route once as needed for up to 1 dose., Disp: 2 Each, Rfl: 3    zinc 15 mg tab, Take  by mouth daily. , Disp: , Rfl:     cetirizine (ALLER-FERNANDA) 10 mg tablet, Take 10 mg by mouth daily. , Disp: , Rfl:     aspirin 81 mg tablet, Take 81 mg by mouth daily. , Disp: , Rfl:     ascorbic acid (VITAMIN C) 500 mg tablet, Take 500 mg by mouth daily. , Disp: , Rfl:     Cholecalciferol, Vitamin D3, (VITAMIN D3) 1,000 unit Cap, Take 1 capsule by mouth daily. , Disp: , Rfl:     multivitamin (ONE A DAY) tablet, Take 1 Tab by mouth daily. , Disp: , Rfl:     solifenacin (VESICARE) 5 mg tablet, Take 1 Tab by mouth daily. For bladder  Indications: URINARY URGE INCONTINENCE, Disp: 30 Tab, Rfl: 5  Review of Systems   Constitutional: Negative. Respiratory: Negative. Cardiovascular: Negative. Gastrointestinal: Negative. Musculoskeletal: Positive for joint pain. Blood pressure 130/73, pulse 81, temperature 97.2 °F (36.2 °C), temperature source Oral, resp.  rate 18, height 5' 3\" (1.6 m), weight 241 lb (109.3 kg), SpO2 98 %. Physical Exam   Constitutional: No distress. HENT:   Mouth/Throat: Oropharynx is clear and moist.   Neck: Neck supple. Cardiovascular: Normal rate and regular rhythm. No murmur heard. Pulmonary/Chest: Effort normal and breath sounds normal.   Abdominal: Soft. Bowel sounds are normal.   Musculoskeletal: She exhibits tenderness. She exhibits no deformity. Generalized joint pain, no swelling noted in joints of fingers wrists, knees and ankles   Nursing note and vitals reviewed. ASSESSMENT and PLAN    ICD-10-CM ICD-9-CM    1.  Arthralgia, unspecified joint M25.50 719.40 SED RATE (ESR)      CBC WITH AUTOMATED DIFF      MARC W/REFLEX      RHEUMATOID FACTOR, QL   await labs, advised to continue with tylenol arthritis  Pt was given an after visit summary which includes diagnosis, current medicines and vital and voiced understanding of treatment plan

## 2017-02-20 NOTE — PROGRESS NOTES
\"Reviewed record in preparation for visit and have obtained the necessary documentation\"  Chief Complaint   Patient presents with    Joint Pain     follow up 02/18       Patient presents in the office today for a follow up of joint pain from appointment 02/18/2017    Patient was evaluated in our office by FRIDA Kincaid and advised to follow up today with PCP for possible additional testing for different types of arthritis     Pain is currently 6/10 in multiple joint areas     1. Have you been to the ER, urgent care clinic since your last visit? Hospitalized since your last visit? No    2. Have you seen or consulted any other health care providers outside of the [address] [place] [address] since your last visit? Include any pap smears or colon screening.  No

## 2017-02-21 LAB
ANA SER QL: NEGATIVE
BASOPHILS # BLD AUTO: 0 X10E3/UL (ref 0–0.2)
BASOPHILS NFR BLD AUTO: 0 %
EOSINOPHIL # BLD AUTO: 0.4 X10E3/UL (ref 0–0.4)
EOSINOPHIL NFR BLD AUTO: 5 %
ERYTHROCYTE [DISTWIDTH] IN BLOOD BY AUTOMATED COUNT: 14.9 % (ref 12.3–15.4)
ERYTHROCYTE [SEDIMENTATION RATE] IN BLOOD BY WESTERGREN METHOD: 30 MM/HR (ref 0–40)
HCT VFR BLD AUTO: 37.1 % (ref 34–46.6)
HGB BLD-MCNC: 12.3 G/DL (ref 11.1–15.9)
IMM GRANULOCYTES # BLD: 0 X10E3/UL (ref 0–0.1)
IMM GRANULOCYTES NFR BLD: 0 %
LYMPHOCYTES # BLD AUTO: 3.6 X10E3/UL (ref 0.7–3.1)
LYMPHOCYTES NFR BLD AUTO: 53 %
MCH RBC QN AUTO: 26.8 PG (ref 26.6–33)
MCHC RBC AUTO-ENTMCNC: 33.2 G/DL (ref 31.5–35.7)
MCV RBC AUTO: 81 FL (ref 79–97)
MONOCYTES # BLD AUTO: 0.3 X10E3/UL (ref 0.1–0.9)
MONOCYTES NFR BLD AUTO: 4 %
NEUTROPHILS # BLD AUTO: 2.6 X10E3/UL (ref 1.4–7)
NEUTROPHILS NFR BLD AUTO: 38 %
PLATELET # BLD AUTO: 337 X10E3/UL (ref 150–379)
RBC # BLD AUTO: 4.59 X10E6/UL (ref 3.77–5.28)
RHEUMATOID FACT SERPL-ACNC: <10 IU/ML (ref 0–13.9)
WBC # BLD AUTO: 6.9 X10E3/UL (ref 3.4–10.8)

## 2017-03-06 ENCOUNTER — OFFICE VISIT (OUTPATIENT)
Dept: FAMILY MEDICINE CLINIC | Age: 52
End: 2017-03-06

## 2017-03-06 VITALS
WEIGHT: 235.4 LBS | OXYGEN SATURATION: 98 % | RESPIRATION RATE: 18 BRPM | SYSTOLIC BLOOD PRESSURE: 129 MMHG | HEART RATE: 64 BPM | DIASTOLIC BLOOD PRESSURE: 74 MMHG | HEIGHT: 63 IN | BODY MASS INDEX: 41.71 KG/M2 | TEMPERATURE: 97 F

## 2017-03-06 DIAGNOSIS — H61.20 IMPACTED CERUMEN, UNSPECIFIED LATERALITY: Primary | ICD-10-CM

## 2017-03-06 NOTE — PROGRESS NOTES
\"Reviewed record in preparation for visit and have obtained the necessary documentation\"      Chief Complaint   Patient presents with    Ear Fullness     Patient presents in the office today with complaints of bilateral ear fullness and difficulty hearing     1. Have you been to the ER, urgent care clinic since your last visit? Hospitalized since your last visit? No    2. Have you seen or consulted any other health care providers outside of the [address] [place] [address] since your last visit? Include any pap smears or colon screening.  No

## 2017-03-06 NOTE — PROGRESS NOTES
HISTORY OF PRESENT ILLNESS  Vazquez Putnam is a 45 y.o. female. HPI: Pt reports bilateral ear fullness and requesting to irrigate her ears. Her right ear has no wax and her left ear has small amount of white material but she is still insisting to irrigate them.   Past Medical History:   Diagnosis Date    Abscess 2012    Allergic rhinitis     Angioedema     Asthma     Back pain 2009    GERD (gastroesophageal reflux disease)     Hypertension      Past Surgical History:   Procedure Laterality Date    HX BREAST BIOPSY Right     BENIGN    HX DILATION AND CURETTAGE      HX GI      COLONOSCOPY    HX GYN  , 2002        HX ORTHOPAEDIC Bilateral     carpal tunnel    HX OTHER SURGICAL  2012    boil(buttock) excised by Dr Abram Perdue  2/4/15    Excision of left buttock abscess by Dr. Jason Liang     Allergies   Allergen Reactions    Latex Swelling     Swelling in face when latex gloves were used at dentist office    Other Food Itching     Peas, raw broccoli, raw cauliflower, croutons    Hydrocodone-Acetaminophen Itching    Ultracet [Tramadol-Acetaminophen] Swelling     Facial and mouth    Biaxin [Clarithromycin] Itching and Nausea Only    Carrot Itching    Codeine Itching    Dilaudid [Hydromorphone] Itching and Nausea Only    Flexeril [Cyclobenzaprine] Itching    Naproxen Rash    Nucynta [Tapentadol] Itching    Oxycodone Swelling     Swelling of hands    Pcn [Penicillins] Rash    Peanut Shortness of Breath and Swelling     All nuts    Red Dye Itching and Swelling     #9    Shellfish Derived Swelling     shrimp    Strawberry Itching    Tramadol Hives     Current Outpatient Prescriptions:     acetaminophen (TYLENOL) 325 mg tablet, Take  by mouth every four (4) hours as needed for Pain., Disp: , Rfl:     ibuprofen (MOTRIN) 800 mg tablet, Take 1 Tab by mouth every eight (8) hours as needed for Pain., Disp: 30 Tab, Rfl: 0   fluticasone-salmeterol (ADVAIR) 250-50 mcg/dose diskus inhaler, Take 1 Puff by inhalation every twelve (12) hours. , Disp: 1 Inhaler, Rfl: 0    raNITIdine (ZANTAC) 150 mg tablet, Take 150 mg by mouth two (2) times a day., Disp: , Rfl:     coenzyme q10 (CO Q-10) 10 mg cap, Take  by mouth., Disp: , Rfl:     amLODIPine (NORVASC) 5 mg tablet, Take 1 Tab by mouth daily. , Disp: 90 Tab, Rfl: 3    fluticasone-vilanterol (BREO ELLIPTA) 100-25 mcg/dose inhaler, Take 1 Puff by inhalation daily. , Disp: 1 Inhaler, Rfl: 5    albuterol (PROVENTIL HFA, VENTOLIN HFA, PROAIR HFA) 90 mcg/actuation inhaler, Take 1-2 Puffs by inhalation every four (4) hours as needed for Wheezing or Shortness of Breath., Disp: 1 Inhaler, Rfl: 2    omeprazole (PRILOSEC OTC) 20 mg tablet, Take 20 mg by mouth daily. , Disp: , Rfl:     EPIPEN 2-WAYNE 0.3 mg/0.3 mL (1:1,000) injection, 0.3 mL by IntraMUSCular route once as needed for up to 1 dose., Disp: 2 Each, Rfl: 3    zinc 15 mg tab, Take  by mouth daily. , Disp: , Rfl:     cetirizine (ALLER-FERNANDA) 10 mg tablet, Take 10 mg by mouth daily. , Disp: , Rfl:     aspirin 81 mg tablet, Take 81 mg by mouth daily. , Disp: , Rfl:     ascorbic acid (VITAMIN C) 500 mg tablet, Take 500 mg by mouth daily. , Disp: , Rfl:     Cholecalciferol, Vitamin D3, (VITAMIN D3) 1,000 unit Cap, Take 1 capsule by mouth daily. , Disp: , Rfl:     multivitamin (ONE A DAY) tablet, Take 1 Tab by mouth daily. , Disp: , Rfl:     solifenacin (VESICARE) 5 mg tablet, Take 1 Tab by mouth daily. For bladder  Indications: URINARY URGE INCONTINENCE, Disp: 30 Tab, Rfl: 5  Review of Systems   Constitutional: Negative. HENT: Negative for ear pain and tinnitus. Respiratory: Negative. Cardiovascular: Negative. Gastrointestinal: Negative. Blood pressure 129/74, pulse 64, temperature 97 °F (36.1 °C), temperature source Oral, resp. rate 18, height 5' 3\" (1.6 m), weight 235 lb 6.4 oz (106.8 kg), SpO2 98 %.     Physical Exam Constitutional: No distress. HENT:   Mouth/Throat: Oropharynx is clear and moist.   Able to visualize both TM s, small amount of cerumen in left ear   Neck: Neck supple. Cardiovascular: Normal rate and regular rhythm. No murmur heard. Pulmonary/Chest: Effort normal and breath sounds normal.   Abdominal: Soft. Bowel sounds are normal.   Nursing note and vitals reviewed. ASSESSMENT and PLAN    ICD-10-CM ICD-9-CM    1. Impacted cerumen, unspecified laterality H61.20 380.4 REMOVE IMPACTED EAR WAX   irrigated both ears.   Pt was given an after visit summary which includes diagnosis, current medicines and vital and voiced understanding of treatment plan

## 2017-04-19 DIAGNOSIS — L02.91 ABSCESS: ICD-10-CM

## 2017-04-19 DIAGNOSIS — I10 ESSENTIAL HYPERTENSION, BENIGN: ICD-10-CM

## 2017-04-19 RX ORDER — CETIRIZINE HCL 10 MG
10 TABLET ORAL DAILY
Qty: 90 TAB | Refills: 1 | Status: SHIPPED | OUTPATIENT
Start: 2017-04-19 | End: 2018-11-08 | Stop reason: SDUPTHER

## 2017-04-19 RX ORDER — AMLODIPINE BESYLATE 5 MG/1
5 TABLET ORAL DAILY
Qty: 90 TAB | Refills: 1 | Status: SHIPPED | OUTPATIENT
Start: 2017-04-19 | End: 2017-07-18 | Stop reason: SDUPTHER

## 2017-04-19 NOTE — TELEPHONE ENCOUNTER
Received faxed refill request for this medication from the pharmacy that is on file. amLODIPine (NORVASC) 5 mg tablet  Take 1 Tab by mouth daily. , Normal, Disp-90 Tab, R-3    cetirizine (ALLER-FERNANDA) 10 mg tablet  Take 1 Tab by mouth daily. , Normal

## 2017-05-18 ENCOUNTER — OFFICE VISIT (OUTPATIENT)
Dept: FAMILY MEDICINE CLINIC | Age: 52
End: 2017-05-18

## 2017-05-18 VITALS
HEIGHT: 63 IN | HEART RATE: 72 BPM | WEIGHT: 233.8 LBS | RESPIRATION RATE: 18 BRPM | BODY MASS INDEX: 41.43 KG/M2 | DIASTOLIC BLOOD PRESSURE: 64 MMHG | SYSTOLIC BLOOD PRESSURE: 118 MMHG | OXYGEN SATURATION: 98 % | TEMPERATURE: 98.2 F

## 2017-05-18 DIAGNOSIS — J30.1 SEASONAL ALLERGIC RHINITIS DUE TO POLLEN: ICD-10-CM

## 2017-05-18 DIAGNOSIS — E78.5 HYPERLIPIDEMIA LDL GOAL <130: Chronic | ICD-10-CM

## 2017-05-18 DIAGNOSIS — Z87.09 HISTORY OF ASTHMA: Chronic | ICD-10-CM

## 2017-05-18 DIAGNOSIS — E66.01 OBESITY, CLASS III, BMI 40-49.9 (MORBID OBESITY) (HCC): ICD-10-CM

## 2017-05-18 DIAGNOSIS — I10 ESSENTIAL HYPERTENSION, BENIGN: ICD-10-CM

## 2017-05-18 DIAGNOSIS — H65.113 ACUTE MUCOID OTITIS MEDIA OF BOTH EARS: Primary | ICD-10-CM

## 2017-05-18 DIAGNOSIS — M54.50 BACK PAIN AT L4-L5 LEVEL: ICD-10-CM

## 2017-05-18 RX ORDER — AZITHROMYCIN 250 MG/1
TABLET, FILM COATED ORAL
Qty: 6 TAB | Refills: 0 | Status: SHIPPED | OUTPATIENT
Start: 2017-05-18 | End: 2017-05-23

## 2017-05-18 NOTE — MR AVS SNAPSHOT
Visit Information Date & Time Provider Department Dept. Phone Encounter #  
 5/18/2017 10:00 AM Germain Badillo MD [address] [place] [address] [phone] [id number] Follow-up Instructions Return if symptoms worsen or fail to improve. Your Appointments 7/18/2017  8:00 AM  
ROUTINE CARE with Germain Badillo MD  
[facility]) Appt Note: fu for labs / ok per Dr Radames Cramer [phone]  
[phone]  
  
   
 [phone] Upcoming Health Maintenance Date Due  
 BREAST CANCER SCRN MAMMOGRAM 9/30/2015 PAP AKA CERVICAL CYTOLOGY 9/8/2017 INFLUENZA AGE 9 TO ADULT 8/1/2017 DTaP/Tdap/Td series (2 - Td) 1/18/2021 COLONOSCOPY 11/11/2023 Allergies as of 5/18/2017  Review Complete On: 5/18/2017 By: Germain Badillo MD  
  
 Severity Noted Reaction Type Reactions Latex  11/24/2014    Swelling Swelling in face when latex gloves were used at dentist office Other Food  01/30/2015    Itching Peas, raw broccoli, raw cauliflower, croutons Hydrocodone-acetaminophen High 02/04/2015   Side Effect Itching Ultracet [Tramadol-acetaminophen] High 01/05/2010    Swelling Facial and mouth Biaxin [Clarithromycin]    Itching, Nausea Only Carrot  01/30/2015    Itching Codeine  09/18/2012    Itching Dilaudid [Hydromorphone]  09/07/2012    Itching, Nausea Only Flexeril [Cyclobenzaprine]  02/29/2012    Itching Naproxen    Rash Nucynta [Tapentadol]  02/13/2015   Side Effect Itching Oxycodone  01/30/2015    Swelling Swelling of hands Pcn [Penicillins]    Rash Peanut  01/30/2015    Shortness of Breath, Swelling All nuts Red Dye  01/30/2015    Itching, Swelling #9 Shellfish Derived  01/30/2015    Swelling  
 shrimp Strawberry  01/30/2015    Itching Tramadol  02/29/2012    Hives Current Immunizations  Reviewed on 7/13/2016 Name Date Influenza Vaccine 11/3/2014 TDAP Vaccine 1/18/2011 Varicella Virus Vaccine 10/1/2013 Not reviewed this visit You Were Diagnosed With   
  
 Codes Comments Acute mucoid otitis media of both ears    -  Primary ICD-10-CM: G68.375 ICD-9-CM: 381.02 Essential hypertension, benign     ICD-10-CM: I10 
ICD-9-CM: 401.1 Seasonal allergic rhinitis due to pollen     ICD-10-CM: J30.1 ICD-9-CM: 477.0 History of asthma     ICD-10-CM: Z87.09 
ICD-9-CM: V12.69 Hyperlipidemia LDL goal <130     ICD-10-CM: E78.5 ICD-9-CM: 272.4 Back pain at L4-L5 level     ICD-10-CM: M54.5 ICD-9-CM: 724.2 Obesity, Class III, BMI 40-49.9 (morbid obesity) (HCC)     ICD-10-CM: E66.01 
ICD-9-CM: 278.01 Vitals BP Pulse Temp Resp Height(growth percentile) Weight(growth percentile)  
 118/64 (BP 1 Location: Left arm, BP Patient Position: Sitting) 72 98.2 °F (36.8 °C) (Oral) 18 5' 3\" (1.6 m) 233 lb 12.8 oz (106.1 kg) SpO2 BMI OB Status Smoking Status 98% 41.42 kg/m2 IUD Never Smoker BMI and BSA Data Body Mass Index Body Surface Area  
 41.42 kg/m 2 2.17 m 2 Preferred Pharmacy Pharmacy Name Phone [facility] # [address], [address] [facility] [phone] Your Updated Medication List  
  
   
This list is accurate as of: 5/18/17  6:14 PM.  Always use your most recent med list.  
  
  
  
  
 albuterol 90 mcg/actuation inhaler Commonly known as:  PROVENTIL HFA, VENTOLIN HFA, PROAIR HFA Take 1-2 Puffs by inhalation every four (4) hours as needed for Wheezing or Shortness of Breath. amLODIPine 5 mg tablet Commonly known as:  Prince Palmer Take 1 Tab by mouth daily. aspirin 81 mg tablet Take 81 mg by mouth daily. azithromycin 250 mg tablet Commonly known as:  Leno Gray Take 2 tablets today, then take 1 tablet daily  
  
 cetirizine 10 mg tablet Commonly known as:  ALLER-FERNANDA Take 1 Tab by mouth daily. CO Q-10 10 mg Cap Generic drug:  coenzyme q10 Take  by mouth. EPIPEN 2-WAYNE 0.3 mg/0.3 mL injection Generic drug:  EPINEPHrine  
0.3 mL by IntraMUSCular route once as needed for up to 1 dose. fluticasone-salmeterol 250-50 mcg/dose diskus inhaler Commonly known as:  ADVAIR Take 1 Puff by inhalation every twelve (12) hours. fluticasone-vilanterol 100-25 mcg/dose inhaler Commonly known as:  BREO ELLIPTA Take 1 Puff by inhalation daily. ibuprofen 800 mg tablet Commonly known as:  MOTRIN Take 1 Tab by mouth every eight (8) hours as needed for Pain.  
  
 multivitamin tablet Commonly known as:  ONE A DAY Take 1 Tab by mouth daily. omeprazole 20 mg tablet Commonly known as:  PRILOSEC OTC Take 20 mg by mouth daily. solifenacin 5 mg tablet Commonly known as:  Fan Bi Take 1 Tab by mouth daily. For bladder  Indications: URINARY URGE INCONTINENCE  
  
 TYLENOL 325 mg tablet Generic drug:  acetaminophen Take  by mouth every four (4) hours as needed for Pain. VITAMIN C 500 mg tablet Generic drug:  ascorbic acid (vitamin C) Take 500 mg by mouth daily. VITAMIN D3 1,000 unit Cap Generic drug:  cholecalciferol Take 1 capsule by mouth daily. ZANTAC 150 mg tablet Generic drug:  raNITIdine Take 150 mg by mouth two (2) times a day. zinc 15 mg Tab Take 15 mg by mouth daily. Prescriptions Sent to Pharmacy Refills  
 azithromycin (ZITHROMAX) 250 mg tablet 0 Sig: Take 2 tablets today, then take 1 tablet daily Class: Normal  
 Pharmacy: [place] [address] [address] [facility] #: [phone] Follow-up Instructions Return if symptoms worsen or fail to improve. Patient Instructions Cough: Care Instructions Your Care Instructions A cough is your body's response to something that bothers your throat or airways. Many things can cause a cough. You might cough because of a cold or the flu, bronchitis, or asthma. Smoking, postnasal drip, allergies, and stomach acid that backs up into your throat also can cause coughs. A cough is a symptom, not a disease. Most coughs stop when the cause, such as a cold, goes away. You can take a few steps at home to cough less and feel better. Follow-up care is a key part of your treatment and safety. Be sure to make and go to all appointments, and call your doctor if you are having problems. It's also a good idea to know your test results and keep a list of the medicines you take. How can you care for yourself at home? · Drink lots of water and other fluids. This helps thin the mucus and soothes a dry or sore throat. Honey or lemon juice in hot water or tea may ease a dry cough. · Take cough medicine as directed by your doctor. · Prop up your head on pillows to help you breathe and ease a dry cough. · Try cough drops to soothe a dry or sore throat. Cough drops don't stop a cough. Medicine-flavored cough drops are no better than candy-flavored drops or hard candy. · Do not smoke. Avoid secondhand smoke. If you need help quitting, talk to your doctor about stop-smoking programs and medicines. These can increase your chances of quitting for good. When should you call for help? Call 911 anytime you think you may need emergency care. For example, call if: 
· You have severe trouble breathing. Call your doctor now or seek immediate medical care if: 
· You cough up blood. · You have new or worse trouble breathing. · You have a new or higher fever. · You have a new rash. Watch closely for changes in your health, and be sure to contact your doctor if: 
· You cough more deeply or more often, especially if you notice more mucus or a change in the color of your mucus. · You have new symptoms, such as a sore throat, an earache, or sinus pain. · You do not get better as expected. Where can you learn more? Go to http://cinda-jaime.info/. Enter D279 in the search box to learn more about \"Cough: Care Instructions. \" Current as of: May 27, 2016 Content Version: 11.2 © 9313-1225 [employer]. Care instructions adapted under license by [employer] (which disclaims liability or warranty for this information). If you have questions about a medical condition or this instruction, always ask your healthcare professional. [address] any warranty or liability for your use of this information. Introducing [facility]! [facility] introduces SocialSci patient portal. Now you can access parts of your medical record, email your doctor's office, and request medication refills online. 1. In your internet browser, go to [email or web address] 2. Click on the First Time User? Click Here link in the Sign In box. You will see the New Member Sign Up page. 3. Enter your SocialSci Access Code exactly as it appears below. You will not need to use this code after youve completed the sign-up process. If you do not sign up before the expiration date, you must request a new code. · SocialSci Access Code: [email or web address] Expires: 8/16/2017 11:03 AM 
 
4. Enter the last four digits of your Social Security Number (xxxx) and Date of Birth (mm/dd/yyyy) as indicated and click Submit. You will be taken to the next sign-up page. 5. Create a Qwaqt ID. This will be your SocialSci login ID and cannot be changed, so think of one that is secure and easy to remember. 6. Create a SocialSci password. You can change your password at any time. 7. Enter your Password Reset Question and Answer. This can be used at a later time if you forget your password. 8. Enter your e-mail address. You will receive e-mail notification when new information is available in [address] 9. Click Sign Up. You can now view and download portions of your medical record. 10. Click the Download Summary menu link to download a portable copy of your medical information. If you have questions, please visit the Frequently Asked Questions section of the Moonshado website. Remember, Moonshado is NOT to be used for urgent needs. For medical emergencies, dial 911. Now available from your iPhone and Android! Please provide this summary of care documentation to your next provider. Your primary care clinician is listed as [address] [place] [address], [facility]/s . If you have any questions after today's visit, please call [phone].

## 2017-05-18 NOTE — LETTER
NOTIFICATION RETURN TO WORK / SCHOOL 
 
5/18/2017 10:59 AM 
 
Ms. Bowen Mora [address] [facility] [address] [place] [id number] To Whom It May Concern: 
 
Bowen Mora is currently under the care of CHANTEL Waters. She has been out of work since 5/16  Due to illness and will return to work 5/23. If there are questions or concerns please have the patient contact our office.  
 
 
 
Sincerely, 
 
 
Wilber Long MD

## 2017-05-18 NOTE — TELEPHONE ENCOUNTER
----- Message from Kailyn Salinas sent at 5/18/2017  2:35 PM EDT -----  Regarding: Dr. Courtney Lithuanian  Patient needs a prescription for Zinc called into the [facility] [address] [facility].

## 2017-05-18 NOTE — PATIENT INSTRUCTIONS
Cough: Care Instructions  Your Care Instructions  A cough is your body's response to something that bothers your throat or airways. Many things can cause a cough. You might cough because of a cold or the flu, bronchitis, or asthma. Smoking, postnasal drip, allergies, and stomach acid that backs up into your throat also can cause coughs. A cough is a symptom, not a disease. Most coughs stop when the cause, such as a cold, goes away. You can take a few steps at home to cough less and feel better. Follow-up care is a key part of your treatment and safety. Be sure to make and go to all appointments, and call your doctor if you are having problems. It's also a good idea to know your test results and keep a list of the medicines you take. How can you care for yourself at home? · Drink lots of water and other fluids. This helps thin the mucus and soothes a dry or sore throat. Honey or lemon juice in hot water or tea may ease a dry cough. · Take cough medicine as directed by your doctor. · Prop up your head on pillows to help you breathe and ease a dry cough. · Try cough drops to soothe a dry or sore throat. Cough drops don't stop a cough. Medicine-flavored cough drops are no better than candy-flavored drops or hard candy. · Do not smoke. Avoid secondhand smoke. If you need help quitting, talk to your doctor about stop-smoking programs and medicines. These can increase your chances of quitting for good. When should you call for help? Call 911 anytime you think you may need emergency care. For example, call if:  · You have severe trouble breathing. Call your doctor now or seek immediate medical care if:  · You cough up blood. · You have new or worse trouble breathing. · You have a new or higher fever. · You have a new rash.   Watch closely for changes in your health, and be sure to contact your doctor if:  · You cough more deeply or more often, especially if you notice more mucus or a change in the color of your mucus. · You have new symptoms, such as a sore throat, an earache, or sinus pain. · You do not get better as expected. Where can you learn more? Go to http://cinda-jaime.info/. Enter D279 in the search box to learn more about \"Cough: Care Instructions. \"  Current as of: May 27, 2016  Content Version: 11.2  © 5609-2079 [employer]. Care instructions adapted under license by [employer] (which disclaims liability or warranty for this information). If you have questions about a medical condition or this instruction, always ask your healthcare professional. [address] any warranty or liability for your use of this information.

## 2017-05-18 NOTE — PROGRESS NOTES
Chief Complaint   Patient presents with    Cough     Cough non productive, patient has had some diarrhea, body aches. Since Tuesday. 1. Have you been to the ER, urgent care clinic since your last visit? Hospitalized since your last visit? No    2. Have you seen or consulted any other health care providers outside of the [facility] [place] since your last visit? Include any pap smears or colon screening. No    Patient states that she has been feeling light headed. Patient started out with a sore throat. Patient does not have sore throat today.

## 2017-07-18 ENCOUNTER — OFFICE VISIT (OUTPATIENT)
Dept: FAMILY MEDICINE CLINIC | Age: 52
End: 2017-07-18

## 2017-07-18 VITALS
HEART RATE: 66 BPM | OXYGEN SATURATION: 98 % | TEMPERATURE: 97.3 F | HEIGHT: 63 IN | DIASTOLIC BLOOD PRESSURE: 77 MMHG | SYSTOLIC BLOOD PRESSURE: 139 MMHG | WEIGHT: 240.2 LBS | BODY MASS INDEX: 42.56 KG/M2 | RESPIRATION RATE: 18 BRPM

## 2017-07-18 DIAGNOSIS — I10 ESSENTIAL HYPERTENSION, BENIGN: Primary | ICD-10-CM

## 2017-07-18 DIAGNOSIS — M25.552 CHRONIC ARTHRALGIAS OF KNEES AND HIPS: ICD-10-CM

## 2017-07-18 DIAGNOSIS — E55.9 VITAMIN D DEFICIENCY: ICD-10-CM

## 2017-07-18 DIAGNOSIS — M54.50 BACK PAIN AT L4-L5 LEVEL: ICD-10-CM

## 2017-07-18 DIAGNOSIS — J30.1 SEASONAL ALLERGIC RHINITIS DUE TO POLLEN: ICD-10-CM

## 2017-07-18 DIAGNOSIS — E78.5 DYSLIPIDEMIA, GOAL LDL BELOW 130: Chronic | ICD-10-CM

## 2017-07-18 DIAGNOSIS — G89.29 CHRONIC ARTHRALGIAS OF KNEES AND HIPS: ICD-10-CM

## 2017-07-18 DIAGNOSIS — M25.551 CHRONIC ARTHRALGIAS OF KNEES AND HIPS: ICD-10-CM

## 2017-07-18 DIAGNOSIS — K21.00 GASTROESOPHAGEAL REFLUX DISEASE WITH ESOPHAGITIS: Chronic | ICD-10-CM

## 2017-07-18 DIAGNOSIS — M25.561 CHRONIC ARTHRALGIAS OF KNEES AND HIPS: ICD-10-CM

## 2017-07-18 DIAGNOSIS — M25.562 CHRONIC ARTHRALGIAS OF KNEES AND HIPS: ICD-10-CM

## 2017-07-18 RX ORDER — AMLODIPINE BESYLATE 5 MG/1
5 TABLET ORAL DAILY
Qty: 90 TAB | Refills: 1 | Status: SHIPPED | OUTPATIENT
Start: 2017-07-18 | End: 2018-01-29 | Stop reason: SDUPTHER

## 2017-07-18 RX ORDER — RANITIDINE 150 MG/1
150 TABLET, FILM COATED ORAL 2 TIMES DAILY
Qty: 180 TAB | Refills: 1 | Status: SHIPPED | OUTPATIENT
Start: 2017-07-18 | End: 2017-09-22 | Stop reason: SDUPTHER

## 2017-07-18 NOTE — PROGRESS NOTES
\"Reviewed record in preparation for visit and have obtained the necessary documentation\"  Chief Complaint   Patient presents with    Hypertension     follow up     Arthritis     discuss       Patient presents in the office today for a follow up of hypertension     Patient also requested to discuss arthritis and treatment. Patient continues to have pain in various joints;ankles,hands,feet,and hips     Patient was also recently diagnosed with trigger finger of left thumb    1. Have you been to the ER, urgent care clinic since your last visit? Hospitalized since your last visit? No    2. Have you seen or consulted any other health care providers outside of the [address] [place] [address] since your last visit? Include any pap smears or colon screening.  No    PHQ over the last two weeks 7/18/2017   Little interest or pleasure in doing things Not at all   Feeling down, depressed or hopeless Not at all   Total Score PHQ 2 0

## 2017-07-18 NOTE — PROGRESS NOTES
HISTORY OF PRESENT ILLNESS  HPI  Wesley Shaw is a 45 y.o. Female with history of HTN, GERD, and hyperlipidemia who presents to office today for follow-up. Pt reports an average out of office BP slightly higher than 140/90. Pt complains of left thumb pain. She saw rheumatologist Dr. Trey Kemp on 17 and was diagnosed with trigger finger and given prednisone. She states she was also told by Dr. Trey Kemp that she does not have rheumatoid arthritis, which had been the previous diagnosis for her aching. She notes that recently her aching has been concentrated in her left thumb, right ankle, and bilateral hips, shoulders, and elbows. She takes OTC ibuprofen 800mg prn pain, stating that she averages taking this medication once a week. Past Medical History:   Diagnosis Date    Abscess 2012    Allergic rhinitis     Angioedema     Asthma     Back pain 2009    Dyslipidemia, goal LDL below 130 2017    GERD (gastroesophageal reflux disease)     History of asthma 2017    Hyperlipidemia LDL goal <130 2017    Hypertension     Vitamin D deficiency 2017     Past Surgical History:   Procedure Laterality Date    HX BREAST BIOPSY Right     BENIGN    HX DILATION AND CURETTAGE      HX GI      COLONOSCOPY    HX GYN  , 2002        HX ORTHOPAEDIC Bilateral     carpal tunnel    HX OTHER SURGICAL  2012    boil(buttock) excised by Dr Meek Solorzano  2/4/15    Excision of left buttock abscess by Dr. Blanca Robles     Current Outpatient Prescriptions on File Prior to Visit   Medication Sig Dispense Refill    zinc 15 mg tab Take 15 mg by mouth daily. 90 Tab 3    cetirizine (ALLER-FERNANDA) 10 mg tablet Take 1 Tab by mouth daily. 90 Tab 1    acetaminophen (TYLENOL) 325 mg tablet Take  by mouth every four (4) hours as needed for Pain.  ibuprofen (MOTRIN) 800 mg tablet Take 1 Tab by mouth every eight (8) hours as needed for Pain.  30 Tab 0    fluticasone-salmeterol (ADVAIR) 250-50 mcg/dose diskus inhaler Take 1 Puff by inhalation every twelve (12) hours. 1 Inhaler 0    coenzyme q10 (CO Q-10) 10 mg cap Take  by mouth.  fluticasone-vilanterol (BREO ELLIPTA) 100-25 mcg/dose inhaler Take 1 Puff by inhalation daily. 1 Inhaler 5    albuterol (PROVENTIL HFA, VENTOLIN HFA, PROAIR HFA) 90 mcg/actuation inhaler Take 1-2 Puffs by inhalation every four (4) hours as needed for Wheezing or Shortness of Breath. 1 Inhaler 2    omeprazole (PRILOSEC OTC) 20 mg tablet Take 20 mg by mouth daily.  EPIPEN 2-WAYNE 0.3 mg/0.3 mL (1:1,000) injection 0.3 mL by IntraMUSCular route once as needed for up to 1 dose. 2 Each 3    aspirin 81 mg tablet Take 81 mg by mouth daily.  ascorbic acid (VITAMIN C) 500 mg tablet Take 500 mg by mouth daily.  Cholecalciferol, Vitamin D3, (VITAMIN D3) 1,000 unit Cap Take 1 capsule by mouth daily.  multivitamin (ONE A DAY) tablet Take 1 Tab by mouth daily. No current facility-administered medications on file prior to visit.       Allergies   Allergen Reactions    Latex Swelling     Swelling in face when latex gloves were used at dentist office    Other Food Itching     Peas, raw broccoli, raw cauliflower, croutons    Hydrocodone-Acetaminophen Itching    Ultracet [Tramadol-Acetaminophen] Swelling     Facial and mouth    Biaxin [Clarithromycin] Itching and Nausea Only    Carrot Itching    Codeine Itching    Dilaudid [Hydromorphone] Itching and Nausea Only    Flexeril [Cyclobenzaprine] Itching    Naproxen Rash    Nucynta [Tapentadol] Itching    Oxycodone Swelling     Swelling of hands    Pcn [Penicillins] Rash    Peanut Shortness of Breath and Swelling     All nuts    Red Dye Itching and Swelling     #9    Shellfish Derived Swelling     shrimp    Strawberry Itching    Tramadol Hives     Family History   Problem Relation Age of Onset    Stroke Mother     Hypertension Mother     Cancer Father PROSTATE    Diabetes Sister     Cancer Maternal Aunt      BCA    Hypertension Brother     Cancer Other     Cancer Other     Cancer Other     Cancer Paternal Grandmother      pancreatic ca    Anesth Problems Neg Hx      Social History     Social History    Marital status:      Spouse name: N/A    Number of children: N/A    Years of education: N/A     Occupational History    [employer]      Social History Main Topics    Smoking status: Never Smoker    Smokeless tobacco: Never Used    Alcohol use 0.0 oz/week     1 Standard drinks or equivalent per week      Comment: OCC.  Drug use: No    Sexual activity: Yes     Partners: Male     Birth control/ protection: Pill     Other Topics Concern    Exercise No     Social History Narrative             Review of Systems   Constitutional: Negative for chills, diaphoresis, fever, malaise/fatigue and weight loss. Eyes: Negative for blurred vision, double vision, pain and redness. Respiratory: Negative for cough, shortness of breath and wheezing. Cardiovascular: Negative for chest pain, palpitations, orthopnea, claudication, leg swelling and PND. Musculoskeletal: Positive for joint pain (left thumb, right ankle, and bilateral hips, shoulders, and elbows). Skin: Negative for itching and rash. Neurological: Negative for dizziness, tingling, tremors, sensory change, speech change, focal weakness, seizures, loss of consciousness, weakness and headaches.      Results for orders placed or performed in visit on 07/18/17   LIPID PANEL   Result Value Ref Range    Cholesterol, total 218 (H) 100 - 199 mg/dL    Triglyceride 65 0 - 149 mg/dL    HDL Cholesterol 69 >39 mg/dL    VLDL, calculated 13 5 - 40 mg/dL    LDL, calculated 136 (H) 0 - 99 mg/dL   CVD REPORT   Result Value Ref Range    INTERPRETATION Note                Physical Exam  Visit Vitals    /77 (BP 1 Location: Left arm, BP Patient Position: Sitting)    Pulse 66    Temp 97.3 °F (36.3 °C) (Oral)    Resp 18    Ht 5' 3\" (1.6 m)    Wt 240 lb 3.2 oz (109 kg)    SpO2 98%    BMI 42.55 kg/m2     Head: Normocephalic, without obvious abnormality, atraumatic  Eyes: conjunctivae/corneas clear. PERRL, EOM's intact. Neck: supple, symmetrical, trachea midline, no adenopathy, thyroid: not enlarged, symmetric, no tenderness/mass/nodules, no carotid bruit and no JVD  Lungs: clear to auscultation bilaterally  Heart: regular rate and rhythm, S1, S2 normal, no murmur, click, rub or gallop  Extremities: extremities normal, atraumatic, no cyanosis or edema  Pulses: 2+ and symmetric  Lymph nodes: Cervical, supraclavicular, and axillary nodes normal.  Neurologic: Grossly normal            ASSESSMENT and PLAN    ICD-10-CM ICD-9-CM    1. Essential hypertension, benign I10 401.1 amLODIPine (NORVASC) 5 mg tablet   2. Gastroesophageal reflux disease with esophagitis K21.0 530.11 raNITIdine (ZANTAC) 150 mg tablet   3. Dyslipidemia, goal LDL below 130 E78.5 272.4 LIPID PANEL      CVD REPORT   4. Seasonal allergic rhinitis due to pollen J30.1 477.0    5. Back pain at L4-L5 level M54.5 724.2    6. Vitamin D deficiency E55.9 268.9    7. 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 Diagnoses and all orders for this visit:    Essential hypertension, benign  -     amLODIPine (NORVASC) 5 mg tablet; Take 1 Tab by mouth daily. Gastroesophageal reflux disease with esophagitis  -     raNITIdine (ZANTAC) 150 mg tablet; Take 1 Tab by mouth two (2) times a day.     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 lab results and schedule of future lab studies reviewed with patient  reviewed diet, exercise and weight control  cardiovascular risk and specific lipid/LDL goals reviewed  reviewed medications and side effects in detail  Please call my office if there are any questions- [phone]. I will arrange for follow up after the lab tests done from today return  Recommended a weekly \"heart check. \" I went into detail how to do this. Call for refills on medications as needed. Discussed expected course/resolution/complications of diagnosis in detail with patient. Medication risks/benefits/costs/interactions/alternatives discussed with patient. Pt was given an after visit summary which includes diagnoses, current medications & vitals. Pt expressed understanding with the diagnosis and plan. Total 25 minutes,60 % counseling re:   Reviewed symptoms, or lack thereof, of hypertension. For her joint pain, which seems to involve most of her joints, I recommended she do low impact activities, and I gave her suggestions on what types of activities to do and how often. She may add fish oil 1000mg daily to her regimen. She asked about tumeric, and I told her that would be another option, but I would add each supplement separately so she can tell which is working, if it causes side effects, etc.     If her trigger finger is not getting better with the ROM exercises I recommended, she should see an orthopedist for further follow up. Another option would be to check back with Dr. Cj Hubbard, who would also offer an injection for that. I suggested she regularly check her BP at least once a week now that she's told us that her BP is sometimes elevated, and she should call back with her readings so we can document it in the chart.     Reviewed in detail the proper technique of checking the blood pressure- check it on an average day only, not on a stressful day, sitting, no exercise for at least 1 hour and not experiencing any new pain( chronic pain is OK). Patient encouraged to check BP sitting and standing at least once a month and to report these readings to me if > 140/ 90 on average , or if the standing BP is >  15 points lower than the sitting. Also, discussed symptoms of concern that were noted today in the note above, treatment options( including doing nothing), when to follow up before recommended time frame. Also, answered all questions. This document was written by Gia Sanders, as dictated by Fareed Smith MD.  I have reviewed and agree with the above note and have made corrections where appropriate Eyad Quach M.D.

## 2017-07-18 NOTE — PATIENT INSTRUCTIONS
Learning About High Blood Pressure  What is high blood pressure? Blood pressure is a measure of how hard the blood pushes against the walls of your arteries. It's normal for blood pressure to go up and down throughout the day, but if it stays up, you have high blood pressure. Another name for high blood pressure is hypertension. Two numbers tell you your blood pressure. The first number is the systolic pressure. It shows how hard the blood pushes when your heart is pumping. The second number is the diastolic pressure. It shows how hard the blood pushes between heartbeats, when your heart is relaxed and filling with blood. A blood pressure of less than 120/80 (say \"120 over 80\") is ideal for an adult. High blood pressure is 140/90 or higher. You have high blood pressure if your top number is 140 or higher or your bottom number is 90 or higher, or both. Many people fall into the category in between, called prehypertension. People with prehypertension need to make lifestyle changes to bring their blood pressure down and help prevent or delay high blood pressure. What happens when you have high blood pressure? · Blood flows through your arteries with too much force. Over time, this damages the walls of your arteries. But you can't feel it. High blood pressure usually doesn't cause symptoms. · Fat and calcium start to build up in your arteries. This buildup is called plaque. Plaque makes your arteries narrower and stiffer. Blood can't flow through them as easily. · This lack of good blood flow starts to damage some of the organs in your body. This can lead to problems such as coronary artery disease and heart attack, heart failure, stroke, kidney failure, and eye damage. How can you prevent high blood pressure? · Stay at a healthy weight. · Try to limit how much sodium you eat to less than 2,300 milligrams (mg) a day.  If you limit your sodium to 1,500 mg a day, you can lower your blood pressure even more.  ¨ Buy foods that are labeled \"unsalted,\" \"sodium-free,\" or \"low-sodium. \" Foods labeled \"reduced-sodium\" and \"light sodium\" may still have too much sodium. ¨ Flavor your food with garlic, lemon juice, onion, vinegar, herbs, and spices instead of salt. Do not use soy sauce, steak sauce, onion salt, garlic salt, mustard, or ketchup on your food. ¨ Use less salt (or none) when recipes call for it. You can often use half the salt a recipe calls for without losing flavor. · Be physically active. Get at least 30 minutes of exercise on most days of the week. Walking is a good choice. You also may want to do other activities, such as running, swimming, cycling, or playing tennis or team sports. · Limit alcohol to 2 drinks a day for men and 1 drink a day for women. · Eat plenty of fruits, vegetables, and low-fat dairy products. Eat less saturated and total fats. How is high blood pressure treated? · Your doctor will suggest making lifestyle changes. For example, your doctor may ask you to eat healthy foods, quit smoking, lose extra weight, and be more active. · If lifestyle changes don't help enough or your blood pressure is very high, you will have to take medicine every day. Follow-up care is a key part of your treatment and safety. Be sure to make and go to all appointments, and call your doctor if you are having problems. It's also a good idea to know your test results and keep a list of the medicines you take. Where can you learn more? Go to http://cinda-jaime.info/. Enter P501 in the search box to learn more about \"Learning About High Blood Pressure. \"  Current as of: March 23, 2016  Content Version: 11.3  © 8908-4952 [employer]. Care instructions adapted under license by [employer] (which disclaims liability or warranty for this information).  If you have questions about a medical condition or this instruction, always ask your healthcare professional. [employer] disclaims any warranty or liability for your use of this information.

## 2017-07-18 NOTE — LETTER
8/4/2017 8:27 AM 
 
Ms. Erlinda Young [address] [place] [address] Dear Erlinda Young: 
 
Please find your most recent results below. Resulted Orders LIPID PANEL Result Value Ref Range Cholesterol, total 218 (H) 100 - 199 mg/dL Triglyceride 65 0 - 149 mg/dL HDL Cholesterol 69 >39 mg/dL VLDL, calculated 13 5 - 40 mg/dL LDL, calculated 136 (H) 0 - 99 mg/dL Narrative Performed at:  [address]  [id number] : Finesse Jacobo MD, Phone:  [phone] CVD REPORT Result Value Ref Range INTERPRETATION Note Comment:  
   Supplement report is available. Narrative Performed at:  [address] [place] [address] [address] [place] [address]  [id number] : Nikki Franklin PhD, Phone:  [phone] RECOMMENDATIONS: 
All of your recent lab tests are normal or at goal except the LDL is above goal (.LDL goal<130,HDL goal>45, Triglyceride goal<150).  I would continue everything the same and schedule your next fasting office visit in 6 months. Losing weight will help this; otherwise you will need to go on a lifelong medication.  
 
 In addition, a physical is recommended every 2 years until the age of 61. Please call me if you have any questions: [phone] Sincerely, 
 
 
Zhen Lewis MD

## 2017-07-19 LAB
CHOLEST SERPL-MCNC: 218 MG/DL (ref 100–199)
HDLC SERPL-MCNC: 69 MG/DL
INTERPRETATION, 910389: NORMAL
LDLC SERPL CALC-MCNC: 136 MG/DL (ref 0–99)
TRIGL SERPL-MCNC: 65 MG/DL (ref 0–149)
VLDLC SERPL CALC-MCNC: 13 MG/DL (ref 5–40)

## 2017-07-21 PROBLEM — E55.9 VITAMIN D DEFICIENCY: Status: ACTIVE | Noted: 2017-07-21

## 2017-08-04 NOTE — PROGRESS NOTES
All of your recent lab tests are normal or at goal except the LDL is above goal (.LDL goal<130,HDL goal>45, Triglyceride goal<150). I would continue everything the same and schedule your next fasting office visit in 6 months. Losing weight will help this; otherwise you will need to go on a lifelong medication. In addition, a physical is recommended every 2 years until the age of 61.

## 2017-09-19 ENCOUNTER — OFFICE VISIT (OUTPATIENT)
Dept: FAMILY MEDICINE CLINIC | Age: 52
End: 2017-09-19

## 2017-09-19 VITALS
BODY MASS INDEX: 43.41 KG/M2 | RESPIRATION RATE: 18 BRPM | SYSTOLIC BLOOD PRESSURE: 136 MMHG | WEIGHT: 245 LBS | HEIGHT: 63 IN | DIASTOLIC BLOOD PRESSURE: 75 MMHG | HEART RATE: 93 BPM | OXYGEN SATURATION: 98 % | TEMPERATURE: 99 F

## 2017-09-19 DIAGNOSIS — J40 BRONCHITIS: ICD-10-CM

## 2017-09-19 DIAGNOSIS — R50.9 FEVER CHILLS: ICD-10-CM

## 2017-09-19 DIAGNOSIS — R05.9 COUGH: Primary | ICD-10-CM

## 2017-09-19 LAB
QUICKVUE INFLUENZA TEST: NEGATIVE
VALID INTERNAL CONTROL?: YES

## 2017-09-19 RX ORDER — PREDNISONE 20 MG/1
20 TABLET ORAL
Qty: 20 TAB | Refills: 0 | Status: SHIPPED | OUTPATIENT
Start: 2017-09-19 | End: 2018-11-05

## 2017-09-19 RX ORDER — DOXYCYCLINE 100 MG/1
100 TABLET ORAL 2 TIMES DAILY
Qty: 20 TAB | Refills: 0 | Status: SHIPPED | OUTPATIENT
Start: 2017-09-19 | End: 2017-09-29

## 2017-09-19 NOTE — PROGRESS NOTES
1. Have you been to the ER, urgent care clinic since your last visit? Hospitalized since your last visit? No    2. Have you seen or consulted any other health care providers outside of the [address] [place] [address] since your last visit? Include any pap smears or colon screening. No     Chief Complaint   Patient presents with    Cold Symptoms     patietn has fever chills,coughing ache,runny nose headache     Learning assessment complete  Abuse Screening Questionnaire 7/18/2017   Do you ever feel afraid of your partner? N   Are you in a relationship with someone who physically or mentally threatens you? N   Is it safe for you to go home?  Mehdi Oakes

## 2017-09-19 NOTE — PROGRESS NOTES
HISTORY OF PRESENT ILLNESS  Charity Nuñez is a 45 y.o. female. HPI: Pt reports symptoms of productive cough, yellow mucus, chills, fever, nasal congestion and fatigue for 1-2 days. Her temp max was 100.5 this morning and took tylenol, currently ha slow grade fever. She has asthmatic and using her  Inhalers. requesting off for the rest of the week  Past Medical History:   Diagnosis Date    Abscess 2012    Allergic rhinitis     Angioedema     Asthma     Back pain 2009    Dyslipidemia, goal LDL below 130 2017    GERD (gastroesophageal reflux disease)     History of asthma 2017    Hyperlipidemia LDL goal <130 2017    Hypertension     Vitamin D deficiency 2017     Past Surgical History:   Procedure Laterality Date    HX BREAST BIOPSY Right     BENIGN    HX DILATION AND CURETTAGE      HX GI      COLONOSCOPY    HX GYN  , 2002        HX ORTHOPAEDIC Bilateral     carpal tunnel    HX OTHER SURGICAL  2012    boil(buttock) excised by Dr Tessa Larson  2/4/15    Excision of left buttock abscess by Dr. De Farm     Allergies   Allergen Reactions    Latex Swelling     Swelling in face when latex gloves were used at dentist office    Other Food Itching     Peas, raw broccoli, raw cauliflower, croutons    Hydrocodone-Acetaminophen Itching    Ultracet [Tramadol-Acetaminophen] Swelling     Facial and mouth    Biaxin [Clarithromycin] Itching and Nausea Only    Carrot Itching    Codeine Itching    Dilaudid [Hydromorphone] Itching and Nausea Only    Flexeril [Cyclobenzaprine] Itching    Naproxen Rash    Nucynta [Tapentadol] Itching    Oxycodone Swelling     Swelling of hands    Pcn [Penicillins] Rash    Peanut Shortness of Breath and Swelling     All nuts    Red Dye Itching and Swelling     #9    Shellfish Derived Swelling     shrimp    Strawberry Itching    Tramadol Hives     Current Outpatient Prescriptions:     doxycycline (ADOXA) 100 mg tablet, Take 1 Tab by mouth two (2) times a day for 10 days. , Disp: 20 Tab, Rfl: 0    predniSONE (DELTASONE) 20 mg tablet, Take 1 Tab by mouth daily (with breakfast). , Disp: 20 Tab, Rfl: 0    raNITIdine (ZANTAC) 150 mg tablet, Take 1 Tab by mouth two (2) times a day., Disp: 180 Tab, Rfl: 1    amLODIPine (NORVASC) 5 mg tablet, Take 1 Tab by mouth daily. , Disp: 90 Tab, Rfl: 1    zinc 15 mg tab, Take 15 mg by mouth daily. , Disp: 90 Tab, Rfl: 3    cetirizine (ALLER-FERNANDA) 10 mg tablet, Take 1 Tab by mouth daily. , Disp: 90 Tab, Rfl: 1    acetaminophen (TYLENOL) 325 mg tablet, Take  by mouth every four (4) hours as needed for Pain., Disp: , Rfl:     ibuprofen (MOTRIN) 800 mg tablet, Take 1 Tab by mouth every eight (8) hours as needed for Pain., Disp: 30 Tab, Rfl: 0    fluticasone-salmeterol (ADVAIR) 250-50 mcg/dose diskus inhaler, Take 1 Puff by inhalation every twelve (12) hours. , Disp: 1 Inhaler, Rfl: 0    coenzyme q10 (CO Q-10) 10 mg cap, Take  by mouth., Disp: , Rfl:     fluticasone-vilanterol (BREO ELLIPTA) 100-25 mcg/dose inhaler, Take 1 Puff by inhalation daily. , Disp: 1 Inhaler, Rfl: 5    omeprazole (PRILOSEC OTC) 20 mg tablet, Take 20 mg by mouth daily. , Disp: , Rfl:     EPIPEN 2-WAYNE 0.3 mg/0.3 mL (1:1,000) injection, 0.3 mL by IntraMUSCular route once as needed for up to 1 dose., Disp: 2 Each, Rfl: 3    aspirin 81 mg tablet, Take 81 mg by mouth daily. , Disp: , Rfl:     ascorbic acid (VITAMIN C) 500 mg tablet, Take 500 mg by mouth daily. , Disp: , Rfl:     Cholecalciferol, Vitamin D3, (VITAMIN D3) 1,000 unit Cap, Take 1 capsule by mouth daily. , Disp: , Rfl:     multivitamin (ONE A DAY) tablet, Take 1 Tab by mouth daily. , Disp: , Rfl:     CYANOCOBALAMIN, VITAMIN B-12, (LIQUID B 12 PO), Take  by mouth., Disp: , Rfl:     albuterol (PROVENTIL HFA, VENTOLIN HFA, PROAIR HFA) 90 mcg/actuation inhaler, Take 1-2 Puffs by inhalation every four (4) hours as needed for Wheezing or Shortness of Breath., Disp: 1 Inhaler, Rfl: 2  Review of Systems   Constitutional: Positive for chills and fever. Eyes: Negative. Respiratory: Positive for cough, sputum production and wheezing. Cardiovascular: Negative. Gastrointestinal: Negative. Blood pressure 136/75, pulse 93, temperature 99 °F (37.2 °C), temperature source Oral, resp. rate 18, height 5' 3\" (1.6 m), weight 245 lb (111.1 kg), SpO2 98 %. Physical Exam   HENT:   Right Ear: External ear normal.   Left Ear: External ear normal.   Nasopharyngeal erythema   Neck: Normal range of motion. Neck supple. Cardiovascular: Normal rate and regular rhythm. No murmur heard. Pulmonary/Chest: Effort normal.   Upper chest congestion, exp wheezing, BS positive all lobes   Abdominal: Soft. Bowel sounds are normal.   Nursing note and vitals reviewed. ASSESSMENT and PLAN    ICD-10-CM ICD-9-CM    1. Cough R05 786.2 XR CHEST PA LAT      predniSONE (DELTASONE) 20 mg tablet   2. Fever chills R50.9 780.60    3.  Bronchitis J40 490 doxycycline (ADOXA) 100 mg tablet     -     AMB POC RAPID INFLUENZA TEST    Will call with CXR results  Given off today  Pt was given an after visit summary which includes diagnosis, current medicines and vital and voiced understanding of treatment plan

## 2017-09-20 ENCOUNTER — TELEPHONE (OUTPATIENT)
Dept: FAMILY MEDICINE CLINIC | Age: 52
End: 2017-09-20

## 2017-09-20 DIAGNOSIS — K21.00 GASTROESOPHAGEAL REFLUX DISEASE WITH ESOPHAGITIS: Chronic | ICD-10-CM

## 2017-09-20 NOTE — TELEPHONE ENCOUNTER
Pharmacy said acid controller landry brand maximum strength is same as her zantac. She takes both for a long time.  Wants to know if this is ok

## 2017-09-20 NOTE — TELEPHONE ENCOUNTER
[facility]   -    [phone]    -   Patient has questions regarding her Acid Reflux medication

## 2017-09-22 RX ORDER — RANITIDINE 300 MG/1
300 TABLET ORAL 2 TIMES DAILY
Qty: 180 TAB | Refills: 1 | Status: SHIPPED | OUTPATIENT
Start: 2017-09-22 | End: 2018-02-20

## 2017-09-22 NOTE — TELEPHONE ENCOUNTER
Patient will take rx for 300mg bid. New rx called in per verbal order Dr Sarah Neri.  Patient will not take any OTC

## 2017-09-22 NOTE — TELEPHONE ENCOUNTER
Patient is calling in regards to a missed call from [facility], tried contacting nurse, no success.     Best call back # for patient: [phone]

## 2017-09-22 NOTE — TELEPHONE ENCOUNTER
Whatever the source, the max dose of Zantac is 300 mg BID, but why not get the Rx 300 mg dose BID as a Rx- or 150 mg OTC ,taking 2 BID whichever is less $$. Need to document the dose in the chart.

## 2018-01-28 DIAGNOSIS — K21.00 GASTROESOPHAGEAL REFLUX DISEASE WITH ESOPHAGITIS: Chronic | ICD-10-CM

## 2018-01-29 DIAGNOSIS — I10 ESSENTIAL HYPERTENSION, BENIGN: ICD-10-CM

## 2018-01-29 RX ORDER — RANITIDINE 150 MG/1
TABLET, FILM COATED ORAL
Qty: 180 TAB | Refills: 0 | Status: SHIPPED | OUTPATIENT
Start: 2018-01-29 | End: 2018-02-20

## 2018-01-29 RX ORDER — AMLODIPINE BESYLATE 5 MG/1
5 TABLET ORAL DAILY
Qty: 90 TAB | Refills: 0 | Status: SHIPPED | OUTPATIENT
Start: 2018-01-29 | End: 2018-04-02 | Stop reason: SDUPTHER

## 2018-02-20 ENCOUNTER — OFFICE VISIT (OUTPATIENT)
Dept: FAMILY MEDICINE CLINIC | Age: 53
End: 2018-02-20

## 2018-02-20 VITALS
WEIGHT: 242 LBS | SYSTOLIC BLOOD PRESSURE: 141 MMHG | HEART RATE: 85 BPM | OXYGEN SATURATION: 97 % | DIASTOLIC BLOOD PRESSURE: 85 MMHG | BODY MASS INDEX: 42.88 KG/M2 | HEIGHT: 63 IN | TEMPERATURE: 98.5 F | RESPIRATION RATE: 18 BRPM

## 2018-02-20 DIAGNOSIS — J06.9 VIRAL URI WITH COUGH: Primary | ICD-10-CM

## 2018-02-20 NOTE — PATIENT INSTRUCTIONS
Viral Respiratory Infection: Care Instructions  Your Care Instructions    Viruses are very small organisms. They grow in number after they enter your body. There are many types that cause different illnesses, such as colds and the mumps. The symptoms of a viral respiratory infection often start quickly. They include a fever, sore throat, and runny nose. You may also just not feel well. Or you may not want to eat much. Most viral respiratory infections are not serious. They usually get better with time and self-care. Antibiotics are not used to treat a viral infection. That's because antibiotics will not help cure a viral illness. In some cases, antiviral medicine can help your body fight a serious viral infection. Follow-up care is a key part of your treatment and safety. Be sure to make and go to all appointments, and call your doctor if you are having problems. It's also a good idea to know your test results and keep a list of the medicines you take. How can you care for yourself at home? · Rest as much as possible until you feel better. · Be safe with medicines. Take your medicine exactly as prescribed. Call your doctor if you think you are having a problem with your medicine. You will get more details on the specific medicine your doctor prescribes. · Take an over-the-counter pain medicine, such as acetaminophen (Tylenol), ibuprofen (Advil, Motrin), or naproxen (Aleve), as needed for pain and fever. Read and follow all instructions on the label. Do not give aspirin to anyone younger than 20. It has been linked to Reye syndrome, a serious illness. · Drink plenty of fluids, enough so that your urine is light yellow or clear like water. Hot fluids, such as tea or soup, may help relieve congestion in your nose and throat. If you have kidney, heart, or liver disease and have to limit fluids, talk with your doctor before you increase the amount of fluids you drink.   · Try to clear mucus from your lungs by breathing deeply and coughing. · Gargle with warm salt water once an hour. This can help reduce swelling and throat pain. Use 1 teaspoon of salt mixed in 1 cup of warm water. · Do not smoke or allow others to smoke around you. If you need help quitting, talk to your doctor about stop-smoking programs and medicines. These can increase your chances of quitting for good. To avoid spreading the virus  · Cough or sneeze into a tissue. Then throw the tissue away. · If you don't have a tissue, use your hand to cover your cough or sneeze. Then clean your hand. You can also cough into your sleeve. · Wash your hands often. Use soap and warm water. Wash for 15 to 20 seconds each time. · If you don't have soap and water near you, you can clean your hands with alcohol wipes or gel. When should you call for help? Call your doctor now or seek immediate medical care if:  ? · You have a new or higher fever. ? · Your fever lasts more than 48 hours. ? · You have trouble breathing. ? · You have a fever with a stiff neck or a severe headache. ? · You are sensitive to light. ? · You feel very sleepy or confused. ? Watch closely for changes in your health, and be sure to contact your doctor if:  ? · You do not get better as expected. Where can you learn more? Go to http://cinda-jaime.info/. Enter H074 in the search box to learn more about \"Viral Respiratory Infection: Care Instructions. \"  Current as of: May 12, 2017  Content Version: 11.4  © 7129-5559 [employer]. Care instructions adapted under license by [employer] (which disclaims liability or warranty for this information). If you have questions about a medical condition or this instruction, always ask your healthcare professional. [address] any warranty or liability for your use of this information.

## 2018-02-20 NOTE — PROGRESS NOTES
Chief Complaint   Patient presents with    Cold Symptoms     diarrhea, productive cough, sore throat X 4 days      1. Have you been to the ER, urgent care clinic since your last visit? Hospitalized since your last visit? No    2. Have you seen or consulted any other health care providers outside of the [address] [place] [address] since your last visit? Include any pap smears or colon screening.  No

## 2018-02-20 NOTE — LETTER
NOTIFICATION RETURN TO WORK / SCHOOL 
 
2/20/2018 12:15 PM 
 
Ms. Guillermo Amezquita [address] [facility] [address] [id number] To Whom It May Concern: 
 
Guillermo Amezquita is currently under the care of CHANTEL Waters. She will return to work/school on: 2/21/2018 If there are questions or concerns please have the patient contact our office. Sincerely, Moscowletitia Lewis, NP

## 2018-02-20 NOTE — MR AVS SNAPSHOT
[address] 
[phone] Patient: Jacolyn Severance MRN: [id number] MGR:[id number] Visit Information Date & Time Provider Department Dept. Phone Encounter #  
 2/20/2018 11:30 AM Yessi Donato  [facility] [phone] [id number] Follow-up Instructions Return for Routine Physical Exam-schedule. Upcoming Health Maintenance Date Due  
 BREAST CANCER SCRN MAMMOGRAM 3/30/2018* PAP AKA CERVICAL CYTOLOGY 3/30/2018* DTaP/Tdap/Td series (2 - Td) 1/18/2021 COLONOSCOPY 11/11/2023 *Topic was postponed. The date shown is not the original due date. Allergies as of 2/20/2018  Review Complete On: 2/20/2018 By: Edrick Gut Severity Noted Reaction Type Reactions Latex  11/24/2014    Swelling Swelling in face when latex gloves were used at dentist office Other Food  01/30/2015    Itching Peas, raw broccoli, raw cauliflower, croutons Hydrocodone-acetaminophen High 02/04/2015   Side Effect Itching Ultracet [Tramadol-acetaminophen] High 01/05/2010    Swelling Facial and mouth Biaxin [Clarithromycin]    Itching, Nausea Only Carrot  01/30/2015    Itching Codeine  09/18/2012    Itching Dilaudid [Hydromorphone]  09/07/2012    Itching, Nausea Only Flexeril [Cyclobenzaprine]  02/29/2012    Itching Naproxen    Rash Nucynta [Tapentadol]  02/13/2015   Side Effect Itching Oxycodone  01/30/2015    Swelling Swelling of hands Pcn [Penicillins]    Rash Peanut  01/30/2015    Shortness of Breath, Swelling All nuts Red Dye  01/30/2015    Itching, Swelling #9 Shellfish Derived  01/30/2015    Swelling  
 shrimp Strawberry  01/30/2015    Itching Tramadol  02/29/2012    Hives Current Immunizations  Reviewed on 7/13/2016 Name Date Influenza Vaccine 11/3/2014 TDAP Vaccine 1/18/2011 Varicella Virus Vaccine 10/1/2013 Not reviewed this visit You Were Diagnosed With   
  
 Codes Comments Viral URI with cough    -  Primary ICD-10-CM: J06.9, B97.89 ICD-9-CM: 465.9 Vitals BP Pulse Temp Resp Height(growth percentile) Weight(growth percentile) 141/85 (BP 1 Location: Left arm, BP Patient Position: Sitting) 85 98.5 °F (36.9 °C) (Oral) 18 5' 3\" (1.6 m) 242 lb (109.8 kg) SpO2 BMI OB Status Smoking Status 97% 42.87 kg/m2 IUD Never Smoker Vitals History BMI and BSA Data Body Mass Index Body Surface Area  
 42.87 kg/m 2 2.21 m 2 Preferred Pharmacy Pharmacy Name Phone [id number] Aurea Graff, [address] [place] [address] [phone] Your Updated Medication List  
  
   
This list is accurate as of: 2/20/18 12:11 PM.  Always use your most recent med list.  
  
  
  
  
 albuterol 90 mcg/actuation inhaler Commonly known as:  PROVENTIL HFA, VENTOLIN HFA, PROAIR HFA Take 1-2 Puffs by inhalation every four (4) hours as needed for Wheezing or Shortness of Breath. amLODIPine 5 mg tablet Commonly known as:  Max Nearing Take 1 Tab by mouth daily. aspirin 81 mg tablet Take 81 mg by mouth daily. cetirizine 10 mg tablet Commonly known as:  ALLER-FERNANDA Take 1 Tab by mouth daily. CO Q-10 10 mg Cap Generic drug:  coenzyme q10 Take  by mouth. EPIPEN 2-WAYNE 0.3 mg/0.3 mL injection Generic drug:  EPINEPHrine  
0.3 mL by IntraMUSCular route once as needed for up to 1 dose. fluticasone-vilanterol 100-25 mcg/dose inhaler Commonly known as:  BREO ELLIPTA Take 1 Puff by inhalation daily. ibuprofen 800 mg tablet Commonly known as:  MOTRIN Take 1 Tab by mouth every eight (8) hours as needed for Pain. LIQUID B 12 PO Take  by mouth.  
  
 multivitamin tablet Commonly known as:  ONE A DAY Take 1 Tab by mouth daily. omeprazole 20 mg tablet Commonly known as:  PRILOSEC OTC Take 20 mg by mouth daily. predniSONE 20 mg tablet Commonly known as:  Adam Heath Take 1 Tab by mouth daily (with breakfast). TYLENOL 325 mg tablet Generic drug:  acetaminophen Take  by mouth every four (4) hours as needed for Pain. VITAMIN C 500 mg tablet Generic drug:  ascorbic acid (vitamin C) Take 500 mg by mouth daily. VITAMIN D3 1,000 unit Cap Generic drug:  cholecalciferol Take 1 capsule by mouth daily. zinc 15 mg Tab Take 15 mg by mouth daily. Follow-up Instructions Return for Routine Physical Exam-schedule. Patient Instructions Viral Respiratory Infection: Care Instructions Your Care Instructions Viruses are very small organisms. They grow in number after they enter your body. There are many types that cause different illnesses, such as colds and the mumps. The symptoms of a viral respiratory infection often start quickly. They include a fever, sore throat, and runny nose. You may also just not feel well. Or you may not want to eat much. Most viral respiratory infections are not serious. They usually get better with time and self-care. Antibiotics are not used to treat a viral infection. That's because antibiotics will not help cure a viral illness. In some cases, antiviral medicine can help your body fight a serious viral infection. Follow-up care is a key part of your treatment and safety. Be sure to make and go to all appointments, and call your doctor if you are having problems. It's also a good idea to know your test results and keep a list of the medicines you take. How can you care for yourself at home? · Rest as much as possible until you feel better. · Be safe with medicines. Take your medicine exactly as prescribed. Call your doctor if you think you are having a problem with your medicine. You will get more details on the specific medicine your doctor prescribes. · Take an over-the-counter pain medicine, such as acetaminophen (Tylenol), ibuprofen (Advil, Motrin), or naproxen (Aleve), as needed for pain and fever. Read and follow all instructions on the label. Do not give aspirin to anyone younger than 20. It has been linked to Reye syndrome, a serious illness. · Drink plenty of fluids, enough so that your urine is light yellow or clear like water. Hot fluids, such as tea or soup, may help relieve congestion in your nose and throat. If you have kidney, heart, or liver disease and have to limit fluids, talk with your doctor before you increase the amount of fluids you drink. · Try to clear mucus from your lungs by breathing deeply and coughing. · Gargle with warm salt water once an hour. This can help reduce swelling and throat pain. Use 1 teaspoon of salt mixed in 1 cup of warm water. · Do not smoke or allow others to smoke around you. If you need help quitting, talk to your doctor about stop-smoking programs and medicines. These can increase your chances of quitting for good. To avoid spreading the virus · Cough or sneeze into a tissue. Then throw the tissue away. · If you don't have a tissue, use your hand to cover your cough or sneeze. Then clean your hand. You can also cough into your sleeve. · Wash your hands often. Use soap and warm water. Wash for 15 to 20 seconds each time. · If you don't have soap and water near you, you can clean your hands with alcohol wipes or gel. When should you call for help? Call your doctor now or seek immediate medical care if: 
? · You have a new or higher fever. ? · Your fever lasts more than 48 hours. ? · You have trouble breathing. ? · You have a fever with a stiff neck or a severe headache. ? · You are sensitive to light. ? · You feel very sleepy or confused. ? Watch closely for changes in your health, and be sure to contact your doctor if: 
? · You do not get better as expected. Where can you learn more? Go to http://cinda-jaime.info/. Enter R187 in the search box to learn more about \"Viral Respiratory Infection: Care Instructions. \" Current as of: May 12, 2017 Content Version: 11.4 © 3234-6089 [employer]. Care instructions adapted under license by [employer]Tree (which disclaims liability or warranty for this information). If you have questions about a medical condition or this instruction, always ask your healthcare professional. [address] any warranty or liability for your use of this information. Introducing [facility] & HEALTH SERVICES! Yojana Rasmussen introduces [employer] patient portal. Now you can access parts of your medical record, email your doctor's office, and request medication refills online. 1. In your internet browser, go to [email or web address] 2. Click on the First Time User? Click Here link in the Sign In box. You will see the New Member Sign Up page. 3. Enter your Rec[employer] Access Code exactly as it appears below. You will not need to use this code after youve completed the sign-up process. If you do not sign up before the expiration date, you must request a new code. · Rec[employer] Access Code: [email or web address] Expires: 5/21/2018 12:11 PM 
 
4. Enter the last four digits of your Social Security Number (xxxx) and Date of Birth (mm/dd/yyyy) as indicated and click Submit. You will be taken to the next sign-up page. 5. Create a Recyclebank ID. This will be your Recyclebank login ID and cannot be changed, so think of one that is secure and easy to remember. 6. Create a Recyclebank password. You can change your password at any time. 7. Enter your Password Reset Question and Answer. This can be used at a later time if you forget your password. 8. Enter your e-mail address. You will receive e-mail notification when new information is available in [address] 9. Click Sign Up. You can now view and download portions of your medical record. 10. Click the Download Summary menu link to download a portable copy of your medical information. If you have questions, please visit the Frequently Asked Questions section of the [employer]King website. Remember, [employer] is NOT to be used for urgent needs. For medical emergencies, dial 911. Now available from your iPhone and Android! Please provide this summary of care documentation to your next provider. Your primary care clinician is listed as [address] [address], [facility]/s . If you have any questions after today's visit, please call [phone].

## 2018-02-22 ENCOUNTER — TELEPHONE (OUTPATIENT)
Dept: FAMILY MEDICINE CLINIC | Age: 53
End: 2018-02-22

## 2018-04-02 ENCOUNTER — OFFICE VISIT (OUTPATIENT)
Dept: FAMILY MEDICINE CLINIC | Age: 53
End: 2018-04-02

## 2018-04-02 VITALS
SYSTOLIC BLOOD PRESSURE: 125 MMHG | HEART RATE: 73 BPM | HEIGHT: 63 IN | OXYGEN SATURATION: 100 % | TEMPERATURE: 98.6 F | DIASTOLIC BLOOD PRESSURE: 84 MMHG | WEIGHT: 240.8 LBS | BODY MASS INDEX: 42.66 KG/M2 | RESPIRATION RATE: 16 BRPM

## 2018-04-02 DIAGNOSIS — E78.5 DYSLIPIDEMIA, GOAL LDL BELOW 130: Primary | Chronic | ICD-10-CM

## 2018-04-02 DIAGNOSIS — E66.01 OBESITY, CLASS III, BMI 40-49.9 (MORBID OBESITY) (HCC): ICD-10-CM

## 2018-04-02 DIAGNOSIS — I10 ESSENTIAL HYPERTENSION, BENIGN: ICD-10-CM

## 2018-04-02 DIAGNOSIS — E55.9 VITAMIN D DEFICIENCY: ICD-10-CM

## 2018-04-02 RX ORDER — AMLODIPINE BESYLATE 5 MG/1
5 TABLET ORAL DAILY
Qty: 90 TAB | Refills: 1 | Status: SHIPPED | OUTPATIENT
Start: 2018-04-02 | End: 2018-11-05 | Stop reason: SDUPTHER

## 2018-04-02 NOTE — MR AVS SNAPSHOT
[address] [facility] 
 
 
 [address] [place] [address] 
[phone] Patient: Becky Siddiqui MRN: [id number] MIP:[id number] Visit Information Date & Time Provider Department Dept. Phone Encounter #  
 4/2/2018  8:15 AM Adina Guallpa  [facility] [phone] [id number] Your Appointments 10/1/2018  8:30 AM  
ROUTINE CARE with Adina Guallpa MD  
[facility]) Appt Note: 6 month follow up  
 [address] [address]  
[phone]  
  
   
 [address] [address] Upcoming Health Maintenance Date Due  
 BREAST CANCER SCRN MAMMOGRAM 9/30/2015 PAP AKA CERVICAL CYTOLOGY 9/8/2017 DTaP/Tdap/Td series (2 - Td) 1/18/2021 COLONOSCOPY 11/11/2023 Allergies as of 4/2/2018  Review Complete On: 4/2/2018 By: Sarah Sotomayor LPN Severity Noted Reaction Type Reactions Latex  11/24/2014    Swelling Swelling in face when latex gloves were used at dentist office Other Food  01/30/2015    Itching Peas, raw broccoli, raw cauliflower, croutons Hydrocodone-acetaminophen High 02/04/2015   Side Effect Itching Ultracet [Tramadol-acetaminophen] High 01/05/2010    Swelling Facial and mouth Biaxin [Clarithromycin]    Itching, Nausea Only Carrot  01/30/2015    Itching Codeine  09/18/2012    Itching Dilaudid [Hydromorphone]  09/07/2012    Itching, Nausea Only Flexeril [Cyclobenzaprine]  02/29/2012    Itching Naproxen    Rash Nucynta [Tapentadol]  02/13/2015   Side Effect Itching Oxycodone  01/30/2015    Swelling Swelling of hands Pcn [Penicillins]    Rash Peanut  01/30/2015    Shortness of Breath, Swelling All nuts Red Dye  01/30/2015    Itching, Swelling #9 Shellfish Derived  01/30/2015    Swelling  
 shrimp Strawberry  01/30/2015    Itching Tramadol  02/29/2012    Hives Current Immunizations  Reviewed on 7/13/2016 Name Date Influenza Vaccine 11/3/2014 TDAP Vaccine 1/18/2011 Varicella Virus Vaccine 10/1/2013 Not reviewed this visit You Were Diagnosed With   
  
 Codes Comments Dyslipidemia, goal LDL below 130    -  Primary ICD-10-CM: E78.5 ICD-9-CM: 272.4 Essential hypertension, benign     ICD-10-CM: I10 
ICD-9-CM: 401.1 Vitamin D deficiency     ICD-10-CM: E55.9 ICD-9-CM: 268.9 Vitals BP Pulse Temp Resp Height(growth percentile) Weight(growth percentile) 125/84 (BP 1 Location: Left arm, BP Patient Position: Sitting) 73 98.6 °F (37 °C) (Oral) 16 5' 3\" (1.6 m) 240 lb 12.8 oz (109.2 kg) SpO2 BMI OB Status Smoking Status 100% 42.66 kg/m2 IUD Never Smoker Vitals History BMI and BSA Data Body Mass Index Body Surface Area  
 42.66 kg/m 2 2.2 m 2 Preferred Pharmacy Pharmacy Name Phone [id number], [address] [facility] [phone] Your Updated Medication List  
  
   
This list is accurate as of 4/2/18  8:43 AM.  Always use your most recent med list.  
  
  
  
  
 albuterol 90 mcg/actuation inhaler Commonly known as:  PROVENTIL HFA, VENTOLIN HFA, PROAIR HFA Take 1-2 Puffs by inhalation every four (4) hours as needed for Wheezing or Shortness of Breath. amLODIPine 5 mg tablet Commonly known as:  Harlon Doyne Take 1 Tab by mouth daily. aspirin 81 mg tablet Take 81 mg by mouth daily. cetirizine 10 mg tablet Commonly known as:  ALLER-FERNANDA Take 1 Tab by mouth daily. CO Q-10 10 mg Cap Generic drug:  coenzyme q10 Take  by mouth. EPIPEN 2-WAYNE 0.3 mg/0.3 mL injection Generic drug:  EPINEPHrine  
0.3 mL by IntraMUSCular route once as needed for up to 1 dose. fluticasone-vilanterol 100-25 mcg/dose inhaler Commonly known as:  BREO ELLIPTA Take 1 Puff by inhalation daily. ibuprofen 800 mg tablet Commonly known as:  MOTRIN Take 1 Tab by mouth every eight (8) hours as needed for Pain. LIQUID B 12 PO Take  by mouth.  
  
 multivitamin tablet Commonly known as:  ONE A DAY Take 1 Tab by mouth daily. omeprazole 20 mg tablet Commonly known as:  PRILOSEC OTC Take 20 mg by mouth daily. predniSONE 20 mg tablet Commonly known as:  Laurina Sax Take 1 Tab by mouth daily (with breakfast). TYLENOL 325 mg tablet Generic drug:  acetaminophen Take  by mouth every four (4) hours as needed for Pain. VITAMIN C 500 mg tablet Generic drug:  ascorbic acid (vitamin C) Take 500 mg by mouth daily. VITAMIN D3 1,000 unit Cap Generic drug:  cholecalciferol Take 1 capsule by mouth daily. zinc 15 mg Tab Take 15 mg by mouth daily. Prescriptions Sent to Pharmacy Refills  
 amLODIPine (NORVASC) 5 mg tablet 1 Sig: Take 1 Tab by mouth daily. Class: Normal  
 Pharmacy: [address] #: [phone] Route: Oral  
  
We Performed the Following LIPID PANEL [35009 CPT(R)] METABOLIC PANEL, COMPREHENSIVE [36112 CPT(R)] VITAMIN D, 25 HYDROXY [id number] CPT(R)] Introducing \[facility]\"" & HEALTH SERVICES! [facility] introduces [employer] patient portal. Now you can access parts of your medical record, email your doctor's office, and request medication refills online. 1. In your internet browser, go to [email or web address] 2. Click on the First Time User? Click Here link in the Sign In box. You will see the New Member Sign Up page. 3. Enter your [employer] Access Code exactly as it appears below. You will not need to use this code after youve completed the sign-up process. If you do not sign up before the expiration date, you must request a new code. · [employer] Access Code: [email or web address] Expires: 5/21/2018  1:11 PM 
 
 4. Enter the last four digits of your Social Security Number (xxxx) and Date of Birth (mm/dd/yyyy) as indicated and click Submit. You will be taken to the next sign-up page. 5. Create a Overinteractive Media ID. This will be your Overinter[employer] Media login ID and cannot be changed, so think of one that is secure and easy to remember. 6. Create a Over[employer] password. You can change your password at any time. 7. Enter your Password Reset Question and Answer. This can be used at a later time if you forget your password. 8. Enter your e-mail address. You will receive e-mail notification when new information is available in [address] 9. Click Sign Up. You can now view and download portions of your medical record. 10. Click the Download Summary menu link to download a portable copy of your medical information. If you have questions, please visit the Frequently Asked Questions section of the Over[employer] website. Remember, Over[employer] is NOT to be used for urgent needs. For medical emergencies, dial 911. Now available from your iPhone and Android! Please provide this summary of care documentation to your next provider. Your primary care clinician is listed as [address] [address], [facility]/s . If you have any questions after today's visit, please call [phone].

## 2018-04-02 NOTE — PROGRESS NOTES
Chief Complaint   Patient presents with    Hypertension    Cholesterol Problem     1. Have you been to the ER, urgent care clinic since your last visit? Hospitalized since your last visit? No    2. Have you seen or consulted any other health care providers outside of the [facility] since your last visit? Include any pap smears or colon screening.  Ronald Rossetti Dr. Sydna Phalen- last week - for back and neck pain     Pap - not completed     Mammogram - not completed

## 2018-04-02 NOTE — PROGRESS NOTES
HISTORY OF PRESENT ILLNESS  HPI  Reed Enriquez is a 52 y.o. Female with a history of HTN, GERD, asthma, hyperlipidemia with LDL goal <130 and vitamin D deficiency, who presents at the office today for a fasting follow up. Pt states that she does not exercise regularly, but does note that she has no issues with her daily routine. Pt takes prednisone prn for allergic reactions, mainly to foods. Pt denies unusual SOB, chest pain, and any recent ER visits or hospitalizations. Past Medical History:   Diagnosis Date    Abscess 2012    Allergic rhinitis     Angioedema     Asthma     Back pain 2009    Dyslipidemia, goal LDL below 130 2017    GERD (gastroesophageal reflux disease)     History of asthma 2017    Hyperlipidemia LDL goal <130 2017    Hypertension     Vitamin D deficiency 2017     Past Surgical History:   Procedure Laterality Date    HX BREAST BIOPSY Right     BENIGN    HX DILATION AND CURETTAGE      HX GI      COLONOSCOPY    HX GYN  , 2002        HX ORTHOPAEDIC Bilateral     carpal tunnel    HX OTHER SURGICAL  2012    boil(buttock) excised by Dr Ky Hui  2/4/15    Excision of left buttock abscess by Dr. Radha Pittman     Current Outpatient Prescriptions on File Prior to Visit   Medication Sig Dispense Refill    predniSONE (DELTASONE) 20 mg tablet Take 1 Tab by mouth daily (with breakfast). 20 Tab 0    CYANOCOBALAMIN, VITAMIN B-12, (LIQUID B 12 PO) Take  by mouth.  zinc 15 mg tab Take 15 mg by mouth daily. 90 Tab 3    cetirizine (ALLER-FERNANDA) 10 mg tablet Take 1 Tab by mouth daily. 90 Tab 1    acetaminophen (TYLENOL) 325 mg tablet Take  by mouth every four (4) hours as needed for Pain.  ibuprofen (MOTRIN) 800 mg tablet Take 1 Tab by mouth every eight (8) hours as needed for Pain. 30 Tab 0    coenzyme q10 (CO Q-10) 10 mg cap Take  by mouth.       fluticasone-vilanterol (BREO ELLIPTA) 100-25 mcg/dose inhaler Take 1 Puff by inhalation daily. 1 Inhaler 5    albuterol (PROVENTIL HFA, VENTOLIN HFA, PROAIR HFA) 90 mcg/actuation inhaler Take 1-2 Puffs by inhalation every four (4) hours as needed for Wheezing or Shortness of Breath. 1 Inhaler 2    omeprazole (PRILOSEC OTC) 20 mg tablet Take 20 mg by mouth daily.  EPIPEN 2-WAYNE 0.3 mg/0.3 mL (1:1,000) injection 0.3 mL by IntraMUSCular route once as needed for up to 1 dose. 2 Each 3    aspirin 81 mg tablet Take 81 mg by mouth daily.  ascorbic acid (VITAMIN C) 500 mg tablet Take 500 mg by mouth daily.  Cholecalciferol, Vitamin D3, (VITAMIN D3) 1,000 unit Cap Take 1 capsule by mouth daily.  multivitamin (ONE A DAY) tablet Take 1 Tab by mouth daily. No current facility-administered medications on file prior to visit.       Allergies   Allergen Reactions    Latex Swelling     Swelling in face when latex gloves were used at dentist office    Other Food Itching     Peas, raw broccoli, raw cauliflower, croutons    Hydrocodone-Acetaminophen Itching    Ultracet [Tramadol-Acetaminophen] Swelling     Facial and mouth    Biaxin [Clarithromycin] Itching and Nausea Only    Carrot Itching    Codeine Itching    Dilaudid [Hydromorphone] Itching and Nausea Only    Flexeril [Cyclobenzaprine] Itching    Naproxen Rash    Nucynta [Tapentadol] Itching    Oxycodone Swelling     Swelling of hands    Pcn [Penicillins] Rash    Peanut Shortness of Breath and Swelling     All nuts    Red Dye Itching and Swelling     #9    Shellfish Derived Swelling     shrimp    Strawberry Itching    Tramadol Hives     Family History   Problem Relation Age of Onset    Stroke Mother     Hypertension Mother     Cancer Father      PROSTATE    Diabetes Sister     Cancer Maternal Aunt      BCA    Hypertension Brother     Cancer Other     Cancer Other     Cancer Other     Cancer Paternal Grandmother      pancreatic ca    Anesth Problems Neg Hx      Social History     Social History    Marital status:      Spouse name: N/A    Number of children: N/A    Years of education: N/A     Occupational History    [employer]      Social History Main Topics    Smoking status: Never Smoker    Smokeless tobacco: Never Used    Alcohol use 0.0 oz/week     1 Standard drinks or equivalent per week      Comment: OCC.  Drug use: No    Sexual activity: Yes     Partners: Male     Birth control/ protection: Pill     Other Topics Concern    Exercise No     Social History Narrative             Review of Systems   Constitutional: Negative for chills, diaphoresis, fever, malaise/fatigue and weight loss. Eyes: Negative for blurred vision, double vision, pain and redness. Respiratory: Negative for cough, shortness of breath and wheezing. Cardiovascular: Negative for chest pain, palpitations, orthopnea, claudication, leg swelling and PND. Skin: Negative for itching and rash. Neurological: Negative for dizziness, tingling, tremors, sensory change, speech change, focal weakness, seizures, loss of consciousness, weakness and headaches. Results for orders placed or performed in visit on 09/19/17   AMB POC RAPID INFLUENZA TEST   Result Value Ref Range    VALID INTERNAL CONTROL POC Yes     QuickVue Influenza test Negative Negative           Physical Exam  Visit Vitals    /84 (BP 1 Location: Left arm, BP Patient Position: Sitting)    Pulse 73    Temp 98.6 °F (37 °C) (Oral)    Resp 16    Ht 5' 3\" (1.6 m)    Wt 240 lb 12.8 oz (109.2 kg)    SpO2 100%    BMI 42.66 kg/m2     Head: Normocephalic, without obvious abnormality, atraumatic  Eyes: conjunctivae/corneas clear. PERRL, EOM's intact.    Neck: supple, symmetrical, trachea midline, no adenopathy, thyroid: not enlarged, symmetric, no tenderness/mass/nodules, no carotid bruit and no JVD  Lungs: clear to auscultation bilaterally  Heart: regular rate and rhythm, S1, S2 normal, no murmur, click, rub or gallop  Extremities: extremities normal, atraumatic, no cyanosis or edema  Pulses: 2+ and symmetric  Lymph nodes: Cervical, supraclavicular, and axillary nodes normal.  Neurologic: Grossly normal        ASSESSMENT and PLAN    ICD-10-CM ICD-9-CM    1. Dyslipidemia, goal LDL below 130 E78.5 272.4 LIPID PANEL      METABOLIC PANEL, COMPREHENSIVE   2. Essential hypertension, benign I10 401.1 amLODIPine (NORVASC) 5 mg tablet   3. Vitamin D deficiency E55.9 268.9 VITAMIN D, 25 HYDROXY   4. Obesity, Class III, BMI 40-49.9 (morbid obesity) ([facility] E66.01 278.01      Diagnoses and all orders for this visit:    1. Dyslipidemia, goal LDL below 130  -     LIPID PANEL  -     METABOLIC PANEL, COMPREHENSIVE    2. Essential hypertension, benign  -     amLODIPine (NORVASC) 5 mg tablet; Take 1 Tab by mouth daily. 3. Vitamin D deficiency  -     VITAMIN D, 25 HYDROXY    4. Obesity, Class III, BMI 40-49.9 (morbid obesity) ([facility]  Assessment & Plan:  Stable, based on history, physical exam and review of pertinent labs, studies and medications; meds reconciled; continue current treatment plan, lifestyle modifications recommended. Key Obesity Meds     Patient is on no anti-obesity meds. Lab Results   Component Value Date/Time    Hemoglobin A1c 5.8 01/18/2017 03:32 PM    Glucose 85 01/18/2017 03:32 PM    Cholesterol, total 218 07/18/2017 08:46 AM    HDL Cholesterol 69 07/18/2017 08:46 AM    LDL, calculated 136 07/18/2017 08:46 AM    Triglyceride 65 07/18/2017 08:46 AM    Sodium 140 01/18/2017 03:32 PM    Potassium 4.2 01/18/2017 03:32 PM    ALT (SGPT) 18 01/18/2017 03:32 PM    AST (SGOT) 21 01/18/2017 03:32 PM             Follow-up Disposition:  Return in about 6 months (around 10/2/2018), or BP OOC, or failure to lose weight, for F/U HTN and CHOL, F/U of obesity.        lab results and schedule of future lab studies reviewed with patient  reviewed diet, exercise and weight control  cardiovascular risk and specific lipid/LDL goals reviewed  reviewed medications and side effects in detail  Please call my office if there are any questions- [phone]. I will arrange for follow up after the lab tests done from today return  Recommended a weekly \"heart check. \" I went into detail how to do this. Call for refills on medications as needed. Discussed expected course/resolution/complications of diagnosis in detail with patient. Medication risks/benefits/costs/interactions/alternatives discussed with patient. Pt was given an after visit summary which includes diagnoses, current medications & vitals. Pt expressed understanding with the diagnosis and plan. Total 25 minutes,60 % counseling re: Reviewed in detail the proper technique of checking the blood pressure- check it on an average day only, not on a stressful day, sitting, no exercise for at least 1 hour and not experiencing any new pain( chronic pain is OK). Patient encouraged to check BP sitting and standing at least once a month and to report these readings to me if > 140/ 90 on average , or if the standing BP is >  15 points lower than the sitting. Reviewed symptoms, or lack thereof, of hypertension and elevated cholesterol. BMI is significantly elevated- in the morbidly obese range. I reviewed diet, exercise and weight control. Discussed weight control in detail, the importance of mainly decreased carbs, and for weight maintenance, exercise; discussed different diets and that it isn't as important to watch the type of foods as it is to decrease calorie intake no matter what type of diet you do, etc. Patient is aware that there are medications and surgery that are options to assist in weight loss. I also mentioned Abdirizak/George as an effective way to lose weight. Should follow up monthly with an office visit if has interest in working on weight loss. Encouraged pt to continue working on her weight, overall she is down a few lbs.  Suggested that she perform a weekly \"heart check\", and monitor her BP at least once a month. Also, discussed symptoms of concern that were noted today in the note above, treatment options( including doing nothing), when to follow up before recommended time frame. Also, answered all questions. This document was written by Swathi Martin, as dictated by Maggy Echeverria MD.  I have reviewed and agree with the above note and have made corrections where appropriate Eyad Hayward M.D.

## 2018-04-03 LAB
25(OH)D3+25(OH)D2 SERPL-MCNC: 33.4 NG/ML (ref 30–100)
ALBUMIN SERPL-MCNC: 4.4 G/DL (ref 3.5–5.5)
ALBUMIN/GLOB SERPL: 1.6 {RATIO} (ref 1.2–2.2)
ALP SERPL-CCNC: 115 IU/L (ref 39–117)
ALT SERPL-CCNC: 13 IU/L (ref 0–32)
AST SERPL-CCNC: 16 IU/L (ref 0–40)
BILIRUB SERPL-MCNC: 0.3 MG/DL (ref 0–1.2)
BUN SERPL-MCNC: 18 MG/DL (ref 6–24)
BUN/CREAT SERPL: 26 (ref 9–23)
CALCIUM SERPL-MCNC: 9.8 MG/DL (ref 8.7–10.2)
CHLORIDE SERPL-SCNC: 102 MMOL/L (ref 96–106)
CHOLEST SERPL-MCNC: 226 MG/DL (ref 100–199)
CO2 SERPL-SCNC: 28 MMOL/L (ref 18–29)
CREAT SERPL-MCNC: 0.7 MG/DL (ref 0.57–1)
GFR SERPLBLD CREATININE-BSD FMLA CKD-EPI: 113 ML/MIN/1.73
GFR SERPLBLD CREATININE-BSD FMLA CKD-EPI: 98 ML/MIN/1.73
GLOBULIN SER CALC-MCNC: 2.8 G/DL (ref 1.5–4.5)
GLUCOSE SERPL-MCNC: 95 MG/DL (ref 65–99)
HDLC SERPL-MCNC: 61 MG/DL
INTERPRETATION, 910389: NORMAL
LDLC SERPL CALC-MCNC: 157 MG/DL (ref 0–99)
POTASSIUM SERPL-SCNC: 4.5 MMOL/L (ref 3.5–5.2)
PROT SERPL-MCNC: 7.2 G/DL (ref 6–8.5)
SODIUM SERPL-SCNC: 144 MMOL/L (ref 134–144)
TRIGL SERPL-MCNC: 42 MG/DL (ref 0–149)
VLDLC SERPL CALC-MCNC: 8 MG/DL (ref 5–40)

## 2018-04-03 NOTE — ASSESSMENT & PLAN NOTE
Stable, based on history, physical exam and review of pertinent labs, studies and medications; meds reconciled; continue current treatment plan, lifestyle modifications recommended. Key Obesity Meds     Patient is on no anti-obesity meds.         Lab Results   Component Value Date/Time    Hemoglobin A1c 5.8 01/18/2017 03:32 PM    Glucose 85 01/18/2017 03:32 PM    Cholesterol, total 218 07/18/2017 08:46 AM    HDL Cholesterol 69 07/18/2017 08:46 AM    LDL, calculated 136 07/18/2017 08:46 AM    Triglyceride 65 07/18/2017 08:46 AM    Sodium 140 01/18/2017 03:32 PM    Potassium 4.2 01/18/2017 03:32 PM    ALT (SGPT) 18 01/18/2017 03:32 PM    AST (SGOT) 21 01/18/2017 03:32 PM

## 2018-04-16 ENCOUNTER — OFFICE VISIT (OUTPATIENT)
Dept: FAMILY MEDICINE CLINIC | Age: 53
End: 2018-04-16

## 2018-04-16 VITALS
OXYGEN SATURATION: 96 % | RESPIRATION RATE: 18 BRPM | SYSTOLIC BLOOD PRESSURE: 139 MMHG | DIASTOLIC BLOOD PRESSURE: 77 MMHG | TEMPERATURE: 98.4 F | HEART RATE: 84 BPM | WEIGHT: 233 LBS | HEIGHT: 64 IN | BODY MASS INDEX: 39.78 KG/M2

## 2018-04-16 DIAGNOSIS — J06.9 VIRAL URI WITH COUGH: Primary | ICD-10-CM

## 2018-04-16 NOTE — MR AVS SNAPSHOT
[address] [facility] [address] 
 
 
 [address] 
[phone] Patient: Desiree Koroma MRN: [id number] Visit Information Date & Time Provider Department Dept. Phone Encounter #  
 4/16/2018  3:30 PM Panfilo Sena, [address] [phone] [id number] Your Appointments 10/1/2018  8:30 AM  
ROUTINE CARE with Clayton Muñoz MD  
[facility]) Appt Note: 6 month follow up  
 [address] [phone]  
[phone]  
  
   
 [address] [address] Upcoming Health Maintenance Date Due  
 BREAST CANCER SCRN MAMMOGRAM 9/30/2015 PAP AKA CERVICAL CYTOLOGY 9/8/2017 DTaP/Tdap/Td series (2 - Td) 1/18/2021 COLONOSCOPY 11/11/2023 Allergies as of 4/16/2018  Review Complete On: 4/16/2018 By: Panfilo Sena NP Severity Noted Reaction Type Reactions Latex  11/24/2014    Swelling Swelling in face when latex gloves were used at dentist office Other Food  01/30/2015    Itching Peas, raw broccoli, raw cauliflower, croutons Hydrocodone-acetaminophen High 02/04/2015   Side Effect Itching Ultracet [Tramadol-acetaminophen] High 01/05/2010    Swelling Facial and mouth Biaxin [Clarithromycin]    Itching, Nausea Only Carrot  01/30/2015    Itching Codeine  09/18/2012    Itching Dilaudid [Hydromorphone]  09/07/2012    Itching, Nausea Only Flexeril [Cyclobenzaprine]  02/29/2012    Itching Naproxen    Rash Nucynta [Tapentadol]  02/13/2015   Side Effect Itching Oxycodone  01/30/2015    Swelling Swelling of hands Pcn [Penicillins]    Rash Peanut  01/30/2015    Shortness of Breath, Swelling All nuts Red Dye  01/30/2015    Itching, Swelling #9 Shellfish Derived  01/30/2015    Swelling  
 shrimp Strawberry  01/30/2015    Itching Tramadol  02/29/2012    Hives Current Immunizations  Reviewed on 7/13/2016 Name Date Influenza Vaccine 11/3/2014 TDAP Vaccine 1/18/2011 Varicella Virus Vaccine 10/1/2013 Not reviewed this visit You Were Diagnosed With   
  
 Codes Comments Viral URI with cough    -  Primary ICD-10-CM: J06.9, B97.89 ICD-9-CM: 465.9 Vitals BP Pulse Temp Resp Height(growth percentile) Weight(growth percentile) 139/77 (BP 1 Location: Left arm, BP Patient Position: Sitting) 84 98.4 °F (36.9 °C) (Oral) 18 5' 4\" (1.626 m) 233 lb (105.7 kg) SpO2 BMI OB Status Smoking Status 96% 39.99 kg/m2 IUD Never Smoker Vitals History BMI and BSA Data Body Mass Index Body Surface Area  
 39.99 kg/m 2 2.18 m 2 Preferred Pharmacy Pharmacy Name Phone [id number] - [place], [address] [phone] Your Updated Medication List  
  
   
This list is accurate as of 4/16/18  4:13 PM.  Always use your most recent med list.  
  
  
  
  
 albuterol 90 mcg/actuation inhaler Commonly known as:  PROVENTIL HFA, VENTOLIN HFA, PROAIR HFA Take 1-2 Puffs by inhalation every four (4) hours as needed for Wheezing or Shortness of Breath. amLODIPine 5 mg tablet Commonly known as:  Marianna William Take 1 Tab by mouth daily. aspirin 81 mg tablet Take 81 mg by mouth daily. cetirizine 10 mg tablet Commonly known as:  ALLER-FERNANDA Take 1 Tab by mouth daily. CO Q-10 10 mg Cap Generic drug:  coenzyme q10 Take  by mouth. EPIPEN 2-WAYNE 0.3 mg/0.3 mL injection Generic drug:  EPINEPHrine  
0.3 mL by IntraMUSCular route once as needed for up to 1 dose. fluticasone-vilanterol 100-25 mcg/dose inhaler Commonly known as:  BREO ELLIPTA Take 1 Puff by inhalation daily. ibuprofen 800 mg tablet Commonly known as:  MOTRIN Take 1 Tab by mouth every eight (8) hours as needed for Pain. LIQUID B 12 PO Take  by mouth.  
  
 multivitamin tablet Commonly known as:  ONE A DAY Take 1 Tab by mouth daily. omeprazole 20 mg tablet Commonly known as:  PRILOSEC OTC Take 20 mg by mouth daily. predniSONE 20 mg tablet Commonly known as:  Ancel Clock Take 1 Tab by mouth daily (with breakfast). TYLENOL 325 mg tablet Generic drug:  acetaminophen Take  by mouth every four (4) hours as needed for Pain. VITAMIN C 500 mg tablet Generic drug:  ascorbic acid (vitamin C) Take 500 mg by mouth daily. VITAMIN D3 1,000 unit Cap Generic drug:  cholecalciferol Take 1 capsule by mouth daily. zinc 15 mg Tab Take 15 mg by mouth daily. Introducing [facility]! [employer] introduces Tasqe patient portal. Now you can access parts of your medical record, email your doctor's office, and request medication refills online. 1. In your internet browser, go to [email or web address] 2. Click on the First Time User? Click Here link in the Sign In box. You will see the New Member Sign Up page. 3. Enter your Tasqe Access Code exactly as it appears below. You will not need to use this code after youve completed the sign-up process. If you do not sign up before the expiration date, you must request a new code. · Tasqe Access Code: [email or web address] Expires: 5/21/2018  1:11 PM 
 
4. Enter the last four digits of your Social Security Number (xxxx) and Date of Birth (mm/dd/yyyy) as indicated and click Submit. You will be taken to the next sign-up page. 5. Create a [employer] ID. This will be your Tasqe login ID and cannot be changed, so think of one that is secure and easy to remember. 6. Create a Tasqe password. You can change your password at any time. 7. Enter your Password Reset Question and Answer. This can be used at a later time if you forget your password. 8. Enter your e-mail address. You will receive e-mail notification when new information is available in [address] 9. Click Sign Up. You can now view and download portions of your medical record. 10. Click the Download Summary menu link to download a portable copy of your medical information. If you have questions, please visit the Frequently Asked Questions section of the Outlisten website. Remember, Outlisten is NOT to be used for urgent needs. For medical emergencies, dial 911. Now available from your iPhone and Android! Please provide this summary of care documentation to your next provider. Your primary care clinician is listed as [address] [address], [facility]/s . If you have any questions after today's visit, please call [phone].

## 2018-04-16 NOTE — PROGRESS NOTES
Chief Complaint   Patient presents with    Cough with sputum    Nasal Congestion     1. Have you been to the ER, urgent care clinic since your last visit? Hospitalized since your last visit? No    2. Have you seen or consulted any other health care providers outside of the [facility] since your last visit? Include any pap smears or colon screening.  No

## 2018-04-16 NOTE — LETTER
NOTIFICATION RETURN TO WORK / SCHOOL 
 
4/16/2018 4:11 PM 
 
Ms. Justine Dean [address] [place] [address] To Whom It May Concern: 
 
Justine Dean is currently under the care of CHANTEL Waters. She will return to work/school on: 4/19/18. She is taking 2 prescriptions for her illness. If there are questions or concerns please have the patient contact our office.  
 
 
 
Sincerely, 
 
 
Ephraim Almeida NP

## 2018-04-16 NOTE — PROGRESS NOTES
HISTORY OF PRESENT ILLNESS  Wesley Shaw is a 52 y.o. female. HPI  C/o cough, chills and sinus congestion x 5 days. No fever. Took tylenol multisymptom with little relief. History of asthma on Breo and albuterol prn. Past medical history, social history, family history and medications were reviewed and updated. Visit Vitals    /77 (BP 1 Location: Left arm, BP Patient Position: Sitting)    Pulse 84    Temp 98.4 °F (36.9 °C) (Oral)    Resp 18    Ht 5' 4\" (1.626 m)    Wt 233 lb (105.7 kg)    SpO2 96%    BMI 39.99 kg/m2     Review of Systems   Constitutional: Positive for chills and malaise/fatigue. Negative for fever. HENT: Positive for congestion. Negative for ear pain, sinus pain and sore throat. Respiratory: Positive for cough. Negative for sputum production, shortness of breath and wheezing. Cardiovascular: Negative for chest pain. All other systems reviewed and are negative. Physical Exam   Constitutional: No distress. Obese. HENT:   Right Ear: Tympanic membrane and ear canal normal.   Left Ear: Tympanic membrane and ear canal normal.   Nose: Mucosal edema present. Right sinus exhibits no maxillary sinus tenderness and no frontal sinus tenderness. Left sinus exhibits no maxillary sinus tenderness and no frontal sinus tenderness. Mouth/Throat: Posterior oropharyngeal erythema present. No oropharyngeal exudate or posterior oropharyngeal edema. Neck: Neck supple. Cardiovascular: Normal rate, regular rhythm and normal heart sounds. Pulmonary/Chest: Effort normal and breath sounds normal. She has no wheezes. Lymphadenopathy:     She has no cervical adenopathy. Skin: Skin is warm and dry. ASSESSMENT and PLAN  Diagnoses and all orders for this visit:    1. Viral URI with cough    Rest and fluids. Mucinex DM bid. Nyquil cough at bedtime. Work note given. Follow up INI 48-72 hours or prn sx progress.

## 2018-04-18 ENCOUNTER — TELEPHONE (OUTPATIENT)
Dept: FAMILY MEDICINE CLINIC | Age: 53
End: 2018-04-18

## 2018-04-18 NOTE — TELEPHONE ENCOUNTER
Patient is calling to provide office with fax number to have her new doctors note faxed to her employer.      Fax: [phone]    Best call back [phone]

## 2018-04-18 NOTE — TELEPHONE ENCOUNTER
Patient is calling in regards to being seen on Monday, April 16, 2018 03:30 PM. She states that she is to return to work on 4/19/18, at this time she states that she will not be able to return back to work, due to her still having a lot of coughing along with her throat bothering her, she states there is some pain in her throat when she swallows. She is requesting a call back with a recommendation on what she needs to do in regards to this.         Best call back [phone]

## 2018-04-19 ENCOUNTER — TELEPHONE (OUTPATIENT)
Dept: FAMILY MEDICINE CLINIC | Age: 53
End: 2018-04-19

## 2018-04-19 NOTE — TELEPHONE ENCOUNTER
----- Message from [facility] sent at 4/18/2018  3:35 PM EDT -----  Regarding: NP [place]/Telephone  Pt is requesting a revised doctor's note for her job to return on 4/24. Pt would like someone to call her when the note is ready for pickup. Pt best contact  [phone].

## 2018-05-14 DIAGNOSIS — K21.00 GASTROESOPHAGEAL REFLUX DISEASE WITH ESOPHAGITIS: Chronic | ICD-10-CM

## 2018-05-14 RX ORDER — RANITIDINE 150 MG/1
TABLET, FILM COATED ORAL
Qty: 180 TAB | Refills: 3 | Status: SHIPPED | OUTPATIENT
Start: 2018-05-14 | End: 2018-11-05 | Stop reason: SDUPTHER

## 2018-06-05 NOTE — TELEPHONE ENCOUNTER
----- Message from Christina Núñez sent at 6/5/2018 10:23 AM EDT -----  Regarding: /Refill  Pt is requesting a change from  Rx Levocetirizine to amlodipine 5-mg as she is allergic to anything containing codeine.  P [phone]

## 2018-11-05 ENCOUNTER — OFFICE VISIT (OUTPATIENT)
Dept: FAMILY MEDICINE CLINIC | Age: 53
End: 2018-11-05

## 2018-11-05 VITALS
OXYGEN SATURATION: 97 % | RESPIRATION RATE: 18 BRPM | TEMPERATURE: 97.9 F | HEIGHT: 64 IN | WEIGHT: 243 LBS | SYSTOLIC BLOOD PRESSURE: 148 MMHG | HEART RATE: 69 BPM | BODY MASS INDEX: 41.48 KG/M2 | DIASTOLIC BLOOD PRESSURE: 82 MMHG

## 2018-11-05 DIAGNOSIS — M72.9 FASCIITIS: ICD-10-CM

## 2018-11-05 DIAGNOSIS — E78.5 DYSLIPIDEMIA, GOAL LDL BELOW 130: Primary | ICD-10-CM

## 2018-11-05 DIAGNOSIS — K21.00 GASTROESOPHAGEAL REFLUX DISEASE WITH ESOPHAGITIS: Chronic | ICD-10-CM

## 2018-11-05 DIAGNOSIS — I10 ESSENTIAL HYPERTENSION, BENIGN: ICD-10-CM

## 2018-11-05 DIAGNOSIS — M25.562 ACUTE PAIN OF LEFT KNEE: ICD-10-CM

## 2018-11-05 RX ORDER — RANITIDINE 150 MG/1
150 TABLET, FILM COATED ORAL 2 TIMES DAILY
Qty: 180 TAB | Refills: 3 | Status: SHIPPED | OUTPATIENT
Start: 2018-11-05 | End: 2020-09-14 | Stop reason: ALTCHOICE

## 2018-11-05 RX ORDER — IBUPROFEN 800 MG/1
800 TABLET ORAL
Qty: 30 TAB | Refills: 0 | Status: SHIPPED | OUTPATIENT
Start: 2018-11-05 | End: 2020-10-26 | Stop reason: ALTCHOICE

## 2018-11-05 RX ORDER — AMLODIPINE BESYLATE 5 MG/1
5 TABLET ORAL DAILY
Qty: 90 TAB | Refills: 1 | Status: SHIPPED | OUTPATIENT
Start: 2018-11-05 | End: 2019-05-20

## 2018-11-05 NOTE — LETTER
11/6/2018 7:20 AM 
 
Ms. Vanessa Simpson [address] [place] [address] Dear aVnessa Simpson: 
 
Please find your most recent results below. Resulted Orders CBC W/O DIFF Result Value Ref Range WBC 5.5 3.4 - 10.8 x10E3/uL  
 RBC 4.57 3.77 - 5.28 x10E6/uL HGB 12.6 11.1 - 15.9 g/dL HCT 38.6 34.0 - 46.6 % MCV 85 79 - 97 fL  
 MCH 27.6 26.6 - 33.0 pg  
 MCHC 32.6 31.5 - 35.7 g/dL  
 RDW 15.0 12.3 - 15.4 % PLATELET 270 758 - 462 x10E3/uL Narrative Performed at:  [address]  [id number] : Connie Pearl MD, Phone:  [phone] METABOLIC PANEL, COMPREHENSIVE Result Value Ref Range Glucose 83 65 - 99 mg/dL BUN 14 6 - 24 mg/dL Creatinine 0.74 0.57 - 1.00 mg/dL GFR est non-AA 91 >59 mL/min/1.73 GFR est  >59 mL/min/1.73  
 BUN/Creatinine ratio 19 9 - 23 Sodium 144 134 - 144 mmol/L Potassium 4.5 3.5 - 5.2 mmol/L Chloride 103 96 - 106 mmol/L  
 CO2 26 20 - 29 mmol/L Calcium 10.2 8.7 - 10.2 mg/dL Protein, total 7.2 6.0 - 8.5 g/dL Albumin 4.4 3.5 - 5.5 g/dL GLOBULIN, TOTAL 2.8 1.5 - 4.5 g/dL A-G Ratio 1.6 1.2 - 2.2 Bilirubin, total 0.4 0.0 - 1.2 mg/dL Alk. phosphatase 123 (H) 39 - 117 IU/L  
 AST (SGOT) 18 0 - 40 IU/L  
 ALT (SGPT) 17 0 - 32 IU/L Narrative Performed at:  [address]  [id number] : Connie Pearl MD, Phone:  [phone] LIPID PANEL Result Value Ref Range Cholesterol, total 233 (H) 100 - 199 mg/dL Triglyceride 43 0 - 149 mg/dL HDL Cholesterol 68 >39 mg/dL VLDL, calculated 9 5 - 40 mg/dL LDL, calculated 156 (H) 0 - 99 mg/dL Narrative Performed at:  [address]  [id number] : Connie Pearl MD, Phone:  [phone] CVD REPORT Result Value Ref Range INTERPRETATION Note Comment: Supplemental report is available. Narrative Performed at:  [address] [place] [address]  [id number] : Lindsey Arriaza MD, Phone:  [phone] Your blood work is in good range, except bad cholesterol is high With high blood pressure bad cholesterol should be less than 130, your bad cholesterol 156 RECOMMENDATIONS: 
I am calling Pravachol 20 g to your pharmacy to take one pill at night Watch your diet and exercise Follow up in three months to recheck your fasting cholesterol Please call me if you have any questions: [phone] Sincerely, Radha Fan NP

## 2018-11-05 NOTE — PROGRESS NOTES
Chief Complaint Patient presents with  Hypertension  
  follow up  Cholesterol Problem  
  follow up  Obesity  
  follow up 1. Have you been to the ER, urgent care clinic since your last visit? Hospitalized since your last visit?no 2. Have you seen or consulted any other health care providers outside of the [address] [place] [address] since your last visit? Include any pap smears or colon screening.  no

## 2018-11-05 NOTE — PROGRESS NOTES
HISTORY OF PRESENT ILLNESS Blanca Liu is a 52 y.o. female. HPI:Cardiovascular Review The patient has hypertension and hyperlipidemia. She reports taking medications as instructed, no medication side effects noted. Diet and Lifestyle: generally follows a low fat low cholesterol diet, generally follows a low sodium diet, no formal exercise but active during the day. Lab review: labs reviewed and discussed with patient. BMI is 41.71: patient is watching her diet but not exercising, he is active during the day. Knee pain: Patient complaints of intermittent left knee pain with several months. She describes pain as dull, better with wearing brace and rest, worse with walking and standing. She works in [employer] and is on her feet all day. Rate pain 7/10. Plantar Fasciitis: she complaints of bilateral foot pain for several months, she describes pain as burning, sharp, better with rest worse with walking, rates pain 8/10. Pain is worse in the mornings . She has seen podiatrist before who prescribed  Motrin with sole inserts. She states that sole insert worked only for three months. She doesn't want to buy them again since they were expensive. Requesting refills on Zantac and Norvasc, but wants to print the prescriptions. Past Medical History:  
Diagnosis Date  Abscess 2012  Allergic rhinitis  Angioedema  Asthma  Back pain 2009  Dyslipidemia, goal LDL below 130 2017  GERD (gastroesophageal reflux disease)  History of asthma 2017  Hyperlipidemia LDL goal <130 2017  Hypertension  Vitamin D deficiency 2017 Past Surgical History:  
Procedure Laterality Date  HX BREAST BIOPSY Right BENIGN  
 HX DILATION AND CURETTAGE    
 HX GI    
 COLONOSCOPY  
 HX GYN  [id number], 2002  
   HX ORTHOPAEDIC Bilateral   
 carpal tunnel  HX OTHER SURGICAL  2012  
 boil(buttock) excised by Dr Da Avery  HX OTHER SURGICAL  2/4/15 Excision of left buttock abscess by Dr. Suzan Fam Allergies Allergen Reactions  Latex Swelling Swelling in face when latex gloves were used at dentist office  Other Food Itching Peas, raw broccoli, raw cauliflower, croutons  Hydrocodone-Acetaminophen Itching  Ultracet [Tramadol-Acetaminophen] Swelling Facial and mouth  Biaxin [Clarithromycin] Itching and Nausea Only  Carrot Itching  Codeine Itching  Dilaudid [Hydromorphone] Itching and Nausea Only  Flexeril [Cyclobenzaprine] Itching  Naproxen Rash  Nucynta [Tapentadol] Itching  Oxycodone Swelling Swelling of hands  Pcn [Penicillins] Rash  Peanut Shortness of Breath and Swelling All nuts  Red Dye Itching and Swelling #9  Shellfish Derived Swelling  
  shrimp  Strawberry Itching  Tramadol Hives Current Outpatient Medications:  
  ibuprofen (MOTRIN) 800 mg tablet, Take 1 Tab by mouth every eight (8) hours as needed for Pain., Disp: 30 Tab, Rfl: 0 
  raNITIdine (ZANTAC) 150 mg tablet, Take 1 Tab by mouth two (2) times a day., Disp: 180 Tab, Rfl: 3 
  amLODIPine (NORVASC) 5 mg tablet, Take 1 Tab by mouth daily. , Disp: 90 Tab, Rfl: 1 
  CYANOCOBALAMIN, VITAMIN B-12, (LIQUID B 12 PO), Take  by mouth., Disp: , Rfl:  
  zinc 15 mg tab, Take 15 mg by mouth daily. , Disp: 90 Tab, Rfl: 3 
  cetirizine (ALLER-FERNANDA) 10 mg tablet, Take 1 Tab by mouth daily. , Disp: 90 Tab, Rfl: 1 
  acetaminophen (TYLENOL) 325 mg tablet, Take  by mouth every four (4) hours as needed for Pain., Disp: , Rfl:  
  coenzyme q10 (CO Q-10) 10 mg cap, Take  by mouth., Disp: , Rfl:  
  albuterol (PROVENTIL HFA, VENTOLIN HFA, PROAIR HFA) 90 mcg/actuation inhaler, Take 1-2 Puffs by inhalation every four (4) hours as needed for Wheezing or Shortness of Breath., Disp: 1 Inhaler, Rfl: 2 
  omeprazole (PRILOSEC OTC) 20 mg tablet, Take 20 mg by mouth daily. , Disp: , Rfl:  
   EPIPEN 2-WAYNE 0.3 mg/0.3 mL (1:1,000) injection, 0.3 mL by IntraMUSCular route once as needed for up to 1 dose., Disp: 2 Each, Rfl: 3 
  aspirin 81 mg tablet, Take 81 mg by mouth daily. , Disp: , Rfl:  
  ascorbic acid (VITAMIN C) 500 mg tablet, Take 500 mg by mouth daily. , Disp: , Rfl:  
  Cholecalciferol, Vitamin D3, (VITAMIN D3) 1,000 unit Cap, Take 1 capsule by mouth daily. , Disp: , Rfl:  
  multivitamin (ONE A DAY) tablet, Take 1 Tab by mouth daily. , Disp: , Rfl:  
  fluticasone-vilanterol (BREO ELLIPTA) 100-25 mcg/dose inhaler, Take 1 Puff by inhalation daily. , Disp: 1 Inhaler, Rfl: 5 Review of Systems Constitutional: Negative. Respiratory: Negative. Cardiovascular: Negative. Gastrointestinal: Negative. Musculoskeletal: Positive for joint pain. Blood pressure 148/82, pulse 69, temperature 97.9 °F (36.6 °C), temperature source Oral, resp. rate 18, height 5' 4\" (1.626 m), weight 243 lb (110.2 kg), SpO2 97 %. Body mass index is 41.71 kg/m². Physical Exam  
Constitutional: No distress. HENT:  
Mouth/Throat: Oropharynx is clear and moist.  
Neck: Normal range of motion. Neck supple. Cardiovascular: Normal rate and regular rhythm. No murmur heard. Pulmonary/Chest: Effort normal and breath sounds normal.  
Abdominal: Soft. Bowel sounds are normal.  
Musculoskeletal: She exhibits tenderness. She exhibits no edema or deformity. Left knee tenderness, NROM, no effusion, no redness Bilateral foot pain Nursing note and vitals reviewed. ASSESSMENT and PLAN Diagnoses and all orders for this visit: 1. Dyslipidemia, goal LDL below 471 
-     METABOLIC PANEL, COMPREHENSIVE 
-     LIPID PANEL 2. Essential hypertension, benign 
-     CBC W/O DIFF 
-     amLODIPine (NORVASC) 5 mg tablet; Take 1 Tab by mouth daily. 3. Acute pain of left knee -     XR KNEE LT 3 V; Future 4. Fasciitis -     ibuprofen (MOTRIN) 800 mg tablet;  Take 1 Tab by mouth every eight (8) hours as needed for Pain. 5. Gastroesophageal reflux disease with esophagitis 
-     raNITIdine (ZANTAC) 150 mg tablet; Take 1 Tab by mouth two (2) times a day. 6. BMI 40.0-44.9, adult ([facility] [address] Normal BMI discussed, advised to continue to watch diet and exercise Pt was given an after visit summary which includes diagnosis, current medicines and vital and voiced understanding of treatment plan

## 2018-11-06 ENCOUNTER — TELEPHONE (OUTPATIENT)
Dept: FAMILY MEDICINE CLINIC | Age: 53
End: 2018-11-06

## 2018-11-06 LAB
ALBUMIN SERPL-MCNC: 4.4 G/DL (ref 3.5–5.5)
ALBUMIN/GLOB SERPL: 1.6 {RATIO} (ref 1.2–2.2)
ALP SERPL-CCNC: 123 IU/L (ref 39–117)
ALT SERPL-CCNC: 17 IU/L (ref 0–32)
AST SERPL-CCNC: 18 IU/L (ref 0–40)
BILIRUB SERPL-MCNC: 0.4 MG/DL (ref 0–1.2)
BUN SERPL-MCNC: 14 MG/DL (ref 6–24)
BUN/CREAT SERPL: 19 (ref 9–23)
CALCIUM SERPL-MCNC: 10.2 MG/DL (ref 8.7–10.2)
CHLORIDE SERPL-SCNC: 103 MMOL/L (ref 96–106)
CHOLEST SERPL-MCNC: 233 MG/DL (ref 100–199)
CO2 SERPL-SCNC: 26 MMOL/L (ref 20–29)
CREAT SERPL-MCNC: 0.74 MG/DL (ref 0.57–1)
ERYTHROCYTE [DISTWIDTH] IN BLOOD BY AUTOMATED COUNT: 15 % (ref 12.3–15.4)
GLOBULIN SER CALC-MCNC: 2.8 G/DL (ref 1.5–4.5)
GLUCOSE SERPL-MCNC: 83 MG/DL (ref 65–99)
HCT VFR BLD AUTO: 38.6 % (ref 34–46.6)
HDLC SERPL-MCNC: 68 MG/DL
HGB BLD-MCNC: 12.6 G/DL (ref 11.1–15.9)
INTERPRETATION, 910389: NORMAL
LDLC SERPL CALC-MCNC: 156 MG/DL (ref 0–99)
MCH RBC QN AUTO: 27.6 PG (ref 26.6–33)
MCHC RBC AUTO-ENTMCNC: 32.6 G/DL (ref 31.5–35.7)
MCV RBC AUTO: 85 FL (ref 79–97)
PLATELET # BLD AUTO: 327 X10E3/UL (ref 150–379)
POTASSIUM SERPL-SCNC: 4.5 MMOL/L (ref 3.5–5.2)
PROT SERPL-MCNC: 7.2 G/DL (ref 6–8.5)
RBC # BLD AUTO: 4.57 X10E6/UL (ref 3.77–5.28)
SODIUM SERPL-SCNC: 144 MMOL/L (ref 134–144)
TRIGL SERPL-MCNC: 43 MG/DL (ref 0–149)
VLDLC SERPL CALC-MCNC: 9 MG/DL (ref 5–40)
WBC # BLD AUTO: 5.5 X10E3/UL (ref 3.4–10.8)

## 2018-11-06 RX ORDER — PRAVASTATIN SODIUM 20 MG/1
20 TABLET ORAL
Qty: 30 TAB | Refills: 3 | Status: SHIPPED | OUTPATIENT
Start: 2018-11-06 | End: 2019-10-15

## 2018-11-08 ENCOUNTER — TELEPHONE (OUTPATIENT)
Dept: FAMILY MEDICINE CLINIC | Age: 53
End: 2018-11-08

## 2018-11-08 DIAGNOSIS — L02.91 ABSCESS: ICD-10-CM

## 2018-11-08 NOTE — TELEPHONE ENCOUNTER
Pt is calling in regards to having new medication called into pharmacy. Medication is allerfex.     LOV Monday, November 05, 2018 08:30 AM    Best call back [phone]    Pharmacy on file verified

## 2018-11-08 NOTE — TELEPHONE ENCOUNTER
Patient states she uses this medication for her allergies.  States she has used this for years off and on  States she did not discuss thi sat last visit because she thought she had more at home  MD Heather Huber LPN   Caller: Unspecified (Today,  2:11 PM)             Clarify why she wants this, what it is for and if she discussed this at an office visit; most recently saw Shanika Rendon)

## 2018-11-09 RX ORDER — CETIRIZINE HCL 10 MG
10 TABLET ORAL DAILY
Qty: 90 TAB | Refills: 3 | Status: SHIPPED | OUTPATIENT
Start: 2018-11-09 | End: 2020-09-14 | Stop reason: ALTCHOICE

## 2018-11-19 DIAGNOSIS — I10 ESSENTIAL HYPERTENSION, BENIGN: ICD-10-CM

## 2018-11-19 RX ORDER — AMLODIPINE BESYLATE 5 MG/1
TABLET ORAL
Qty: 90 TAB | Refills: 1 | Status: SHIPPED | OUTPATIENT
Start: 2018-11-19 | End: 2019-08-22 | Stop reason: SDUPTHER

## 2019-03-11 ENCOUNTER — TELEPHONE (OUTPATIENT)
Dept: FAMILY MEDICINE CLINIC | Age: 54
End: 2019-03-11

## 2019-03-11 NOTE — TELEPHONE ENCOUNTER
[phone] spoke to Pk  notified her of Gwendloyn Show PA for Symbicort Denied  Notes :[address] denied patient needs to try [employer] or [employer].  I don't see anything in the chart he has tried them   per Pk Castella he had tried all of them I advised her we don't have them in the chart per Pk Castella its with his pediatric I advised needs to call them to send us records

## 2019-03-11 NOTE — TELEPHONE ENCOUNTER
PT is calling about a letter that was dropped off for [facility]  for Symbicort. However the doctor called in [place] and the patient has not use this med in over three years. Pt needs the Prior Auth for Symbicort. Pt was using a sample of this meds.

## 2019-04-29 ENCOUNTER — TELEPHONE (OUTPATIENT)
Dept: FAMILY MEDICINE CLINIC | Age: 54
End: 2019-04-29

## 2019-04-29 NOTE — TELEPHONE ENCOUNTER
ANGI    Patient is calling stating that from now on she would like her labs to be done at [facility] callback:  [phone]      LOV:  Monday, November 05, 2018

## 2019-05-07 DIAGNOSIS — I10 ESSENTIAL HYPERTENSION, BENIGN: ICD-10-CM

## 2019-05-16 NOTE — TELEPHONE ENCOUNTER
Patient called very impatient and rude, stating that she will go somewhere else as we didn't refill her medication, and she stated someone scheduled her for Monday with Dr Karmen Pina, (she did not have any apt scheduled) when adviced to give her apt with Dr Camila Dias at May 31st, she doesn't want to come on 31st, patient wanted to be seen on Monday  Scheduled for  May 20, 2019 08:30 AM with Russ  Requesting medication be refilled until her apt as she is completely out

## 2019-05-16 NOTE — TELEPHONE ENCOUNTER
----- Message from Bijan Morley sent at 5/16/2019 12:45 PM EDT -----  Regarding: DR Hollie Cee / REFILL  Pt is requesting to speak to nurse in regard to refilling \"Amlodipine\" at [facility] on file. She expressed that she would like an appt with Dr. Berkley Garcia next week. Due to the denial of her medication.      Best contact: [phone]

## 2019-05-17 RX ORDER — AMLODIPINE BESYLATE 5 MG/1
TABLET ORAL
Qty: 90 TAB | Refills: 0 | Status: SHIPPED | OUTPATIENT
Start: 2019-05-17 | End: 2019-05-20

## 2019-05-20 ENCOUNTER — OFFICE VISIT (OUTPATIENT)
Dept: FAMILY MEDICINE CLINIC | Age: 54
End: 2019-05-20

## 2019-05-20 VITALS
DIASTOLIC BLOOD PRESSURE: 77 MMHG | TEMPERATURE: 97.5 F | OXYGEN SATURATION: 99 % | WEIGHT: 234 LBS | SYSTOLIC BLOOD PRESSURE: 136 MMHG | BODY MASS INDEX: 39.95 KG/M2 | HEART RATE: 65 BPM | HEIGHT: 64 IN | RESPIRATION RATE: 16 BRPM

## 2019-05-20 DIAGNOSIS — I10 ESSENTIAL HYPERTENSION, BENIGN: Primary | ICD-10-CM

## 2019-05-20 DIAGNOSIS — E78.5 DYSLIPIDEMIA, GOAL LDL BELOW 130: ICD-10-CM

## 2019-05-20 NOTE — PROGRESS NOTES
HISTORY OF PRESENT ILLNESS  Da Drew is a 62 y.o. female. HPI:Cardiovascular Review  The patient has hypertension, hyperlipidemia and morbid obesity. She reports taking medications as instructed, no medication side effects noted. Diet and Lifestyle: generally follows a low fat low cholesterol diet, generally follows a low sodium diet, no formal exercise but active during the day. Lab review: labs reviewed and discussed with patient.     Past Surgical History:   Procedure Laterality Date    HX BREAST BIOPSY Right     BENIGN    HX DILATION AND CURETTAGE      HX GI      COLONOSCOPY    HX GYN  , 2002        HX ORTHOPAEDIC Bilateral     carpal tunnel    HX OTHER SURGICAL  2012    boil(buttock) excised by Dr Libra Tobar  2/4/15    Excision of left buttock abscess by Dr. Maisha Cevallos     Past Surgical History:   Procedure Laterality Date    HX BREAST BIOPSY Right     BENIGN    HX DILATION AND CURETTAGE      HX GI      COLONOSCOPY    HX GYN  , 2002        HX ORTHOPAEDIC Bilateral     carpal tunnel    HX OTHER SURGICAL  2012    boil(buttock) excised by Dr Kar Abbott    [facility]  2/4/15    Excision of left buttock abscess by Dr. Maisha Cevallos     Allergies   Allergen Reactions    Latex Swelling     Swelling in face when latex gloves were used at dentist office    Other Food Itching     Peas, raw broccoli, raw cauliflower, croutons    Hydrocodone-Acetaminophen Itching    Ultracet [Tramadol-Acetaminophen] Swelling     Facial and mouth    Biaxin [Clarithromycin] Itching and Nausea Only    Carrot Itching    Codeine Itching    Dilaudid [Hydromorphone] Itching and Nausea Only    Flexeril [Cyclobenzaprine] Itching    Naproxen Rash    Nucynta [Tapentadol] Itching    Oxycodone Swelling     Swelling of hands    Pcn [Penicillins] Rash    Peanut Shortness of Breath and Swelling     All nuts    Red Dye Itching and Swelling #9    Shellfish Derived Swelling     shrimp    Strawberry Itching    Tramadol Hives     Current Outpatient Medications:     amLODIPine (NORVASC) 5 mg tablet, TAKE ONE TABLET BY MOUTH ONE TIME DAILY , Disp: 90 Tab, Rfl: 1    ibuprofen (MOTRIN) 800 mg tablet, Take 1 Tab by mouth every eight (8) hours as needed for Pain., Disp: 30 Tab, Rfl: 0    raNITIdine (ZANTAC) 150 mg tablet, Take 1 Tab by mouth two (2) times a day., Disp: 180 Tab, Rfl: 3    CYANOCOBALAMIN, VITAMIN B-12, (LIQUID B 12 PO), Take  by mouth., Disp: , Rfl:     acetaminophen (TYLENOL) 325 mg tablet, Take  by mouth every four (4) hours as needed for Pain., Disp: , Rfl:     coenzyme q10 (CO Q-10) 10 mg cap, Take  by mouth., Disp: , Rfl:     fluticasone-vilanterol (BREO ELLIPTA) 100-25 mcg/dose inhaler, Take 1 Puff by inhalation daily. , Disp: 1 Inhaler, Rfl: 5    albuterol (PROVENTIL HFA, VENTOLIN HFA, PROAIR HFA) 90 mcg/actuation inhaler, Take 1-2 Puffs by inhalation every four (4) hours as needed for Wheezing or Shortness of Breath., Disp: 1 Inhaler, Rfl: 2    aspirin 81 mg tablet, Take 81 mg by mouth daily. , Disp: , Rfl:     ascorbic acid (VITAMIN C) 500 mg tablet, Take 500 mg by mouth daily. , Disp: , Rfl:     Cholecalciferol, Vitamin D3, (VITAMIN D3) 1,000 unit Cap, Take 1 capsule by mouth daily. , Disp: , Rfl:     multivitamin (ONE A DAY) tablet, Take 1 Tab by mouth daily. , Disp: , Rfl:     cetirizine (ALLER-FERNANDA) 10 mg tablet, Take 1 Tab by mouth daily. Prn allergies, Disp: 90 Tab, Rfl: 3    pravastatin (PRAVACHOL) 20 mg tablet, Take 1 Tab by mouth nightly., Disp: 30 Tab, Rfl: 3    zinc 15 mg tab, Take 15 mg by mouth daily. , Disp: 90 Tab, Rfl: 3    omeprazole (PRILOSEC OTC) 20 mg tablet, Take 20 mg by mouth daily. , Disp: , Rfl:     EPIPEN 2-WAYNE 0.3 mg/0.3 mL (1:1,000) injection, 0.3 mL by IntraMUSCular route once as needed for up to 1 dose., Disp: 2 Each, Rfl: 3  Review of Systems   Constitutional: Negative. Respiratory: Negative. Cardiovascular: Negative. Gastrointestinal: Negative. Blood pressure 136/77, pulse 65, temperature 97.5 °F (36.4 °C), temperature source Oral, resp. rate 16, height 5' 4\" (1.626 m), weight 234 lb (106.1 kg), SpO2 99 %. Body mass index is 40.17 kg/m². Physical Exam   Constitutional: No distress. HENT:   Mouth/Throat: Oropharynx is clear and moist.   Neck: Normal range of motion. Neck supple. Cardiovascular: Normal rate and regular rhythm. No murmur heard. Pulmonary/Chest: Effort normal and breath sounds normal.   Abdominal: Soft. Bowel sounds are normal.   Skin: Skin is warm and dry. Nursing note and vitals reviewed. ASSESSMENT and PLAN  Diagnoses and all orders for this visit:    1. Essential hypertension, benign  -     METABOLIC PANEL, COMPREHENSIVE  -     CBC W/O DIFF    2. Dyslipidemia, goal LDL below 130  -     LIPID PANEL    3.  BMI 40.0-44.9, adult ([address]  Diet and exercise  Follow up in six months for fasting office visit and blood work  Pt was given an after visit summary which includes diagnosis, current medicines and vital and voiced understanding of treatment plan

## 2019-05-20 NOTE — PROGRESS NOTES
Chief Complaint   Patient presents with    Hypertension     follow up       Reviewed Record in preparation for visit and have obtained necessary documentation. Identified pt with two pt identifiers (Name @ )    Health Maintenance Due   Topic    Pneumococcal 0-64 years (1 of 1 - PPSV23)    PAP AKA CERVICAL CYTOLOGY     BREAST CANCER SCRN MAMMOGRAM          1. Have you been to the ER, urgent care clinic since your last visit? Hospitalized since your last visit? no    2. Have you seen or consulted any other health care providers outside of the [address] [place] [address] since your last visit? Include any pap smears or colon screening.  no

## 2019-05-21 LAB
ALB/GLOBRATIO, 58C: 1.7 (CALC) (ref 1–2.5)
ALBUMIN SERPL-MCNC: 4.5 G/DL (ref 3.6–5.1)
ALP SERPL-CCNC: 115 U/L (ref 33–130)
ALT SERPL-CCNC: 17 U/L (ref 6–29)
AST SERPL W P-5'-P-CCNC: 16 U/L (ref 10–35)
BILIRUB SERPL-MCNC: 0.4 MG/DL (ref 0.2–1.2)
BUN SERPL-MCNC: 11 MG/DL (ref 7–25)
BUN/CREATININE RATIO,BUCR: NORMAL (CALC) (ref 6–22)
CALCIUM SERPL-MCNC: 10.1 MG/DL (ref 8.6–10.4)
CHLORIDE SERPL-SCNC: 105 MMOL/L (ref 98–110)
CHOL/HDL RATIO,CHHDX: 3 (CALC)
CHOLEST SERPL-MCNC: 218 MG/DL
CO2 SERPL-SCNC: 32 MMOL/L (ref 20–32)
CREAT SERPL-MCNC: 0.62 MG/DL (ref 0.5–1.05)
ERYTHROCYTE [DISTWIDTH] IN BLOOD BY AUTOMATED COUNT: 14.2 % (ref 11–15)
GLOBULIN,GLOB: 2.7 G/DL (CALC) (ref 1.9–3.7)
GLUCOSE SERPL-MCNC: 84 MG/DL (ref 65–99)
HCT VFR BLD AUTO: 38.6 % (ref 35–45)
HDLC SERPL-MCNC: 72 MG/DL
HGB BLD-MCNC: 12.8 G/DL (ref 11.7–15.5)
LDL-CHOLESTEROL: 131 MG/DL (CALC)
MCH RBC QN AUTO: 27.8 PG (ref 27–33)
MCHC RBC AUTO-ENTMCNC: 33.2 G/DL (ref 32–36)
MCV RBC AUTO: 83.9 FL (ref 80–100)
NON-HDL CHOLESTEROL, 011976: 146 MG/DL (CALC)
PLATELET # BLD AUTO: 311 THOUSAND/UL (ref 140–400)
PMV BLD AUTO: 10.3 FL (ref 7.5–12.5)
POTASSIUM SERPL-SCNC: 4.5 MMOL/L (ref 3.5–5.3)
PROT SERPL-MCNC: 7.2 G/DL (ref 6.1–8.1)
RBC # BLD AUTO: 4.6 MILLION/UL (ref 3.8–5.1)
SODIUM SERPL-SCNC: 141 MMOL/L (ref 135–146)
TRIGL SERPL-MCNC: 60 MG/DL (ref ?–150)
WBC # BLD AUTO: 5.9 THOUSAND/UL (ref 3.8–10.8)

## 2019-06-06 ENCOUNTER — OFFICE VISIT (OUTPATIENT)
Dept: FAMILY MEDICINE CLINIC | Age: 54
End: 2019-06-06

## 2019-06-06 VITALS
RESPIRATION RATE: 18 BRPM | WEIGHT: 241 LBS | BODY MASS INDEX: 41.15 KG/M2 | DIASTOLIC BLOOD PRESSURE: 76 MMHG | TEMPERATURE: 97.9 F | OXYGEN SATURATION: 100 % | SYSTOLIC BLOOD PRESSURE: 140 MMHG | HEIGHT: 64 IN | HEART RATE: 71 BPM

## 2019-06-06 DIAGNOSIS — M54.50 ACUTE LEFT-SIDED LOW BACK PAIN WITHOUT SCIATICA: Primary | ICD-10-CM

## 2019-06-06 RX ORDER — METHOCARBAMOL 500 MG/1
500 TABLET, FILM COATED ORAL
Qty: 15 TAB | Refills: 0 | Status: SHIPPED | OUTPATIENT
Start: 2019-06-06 | End: 2019-06-13 | Stop reason: SDUPTHER

## 2019-06-06 RX ORDER — METHOCARBAMOL 500 MG/1
500 TABLET, FILM COATED ORAL
Qty: 15 TAB | Refills: 0 | Status: SHIPPED | OUTPATIENT
Start: 2019-06-06 | End: 2019-06-06 | Stop reason: SDUPTHER

## 2019-06-06 NOTE — PROGRESS NOTES
Chief Complaint   Patient presents with    Back Pain     pt states her back locked up 2 days ago. hasnt been to work in 2 days. no injury noted. \"REVIEWED RECORD IN PREPARATION FOR VISIT AND HAVE OBTAINED THE NECESSARY DOCUMENTATION\"  1. Have you been to the ER, urgent care clinic since your last visit? Hospitalized since your last visit? No    2. Have you seen or consulted any other health care providers outside of the [address] [place] [address] since your last visit? Include any pap smears or colon screening.  No

## 2019-06-06 NOTE — PATIENT INSTRUCTIONS
Take Aleve 2 tablets 3 times daily for 3 days,  Then only take aleve 1 tablet twice daily as needed. (Do not take with any other OTC NSAID's)  You can take muscle relaxer twice daily as needed - side effects include drowsiness. Low Back Pain: Exercises  Your Care Instructions  Here are some examples of typical rehabilitation exercises for your condition. Start each exercise slowly. Ease off the exercise if you start to have pain. Your doctor or physical therapist will tell you when you can start these exercises and which ones will work best for you. How to do the exercises  Press-up    1. Lie on your stomach, supporting your body with your forearms. 2. Press your elbows down into the floor to raise your upper back. As you do this, relax your stomach muscles and allow your back to arch without using your back muscles. As your press up, do not let your hips or pelvis come off the floor. 3. Hold for 15 to 30 seconds, then relax. 4. Repeat 2 to 4 times. Alternate arm and leg (bird dog) exercise    1. Start on the floor, on your hands and knees. 2. Tighten your belly muscles. 3. Raise one leg off the floor, and hold it straight out behind you. Be careful not to let your hip drop down, because that will twist your trunk. 4. Hold for about 6 seconds, then lower your leg and switch to the other leg. 5. Repeat 8 to 12 times on each leg. 6. Over time, work up to holding for 10 to 30 seconds each time. 7. If you feel stable and secure with your leg raised, try raising the opposite arm straight out in front of you at the same time. Knee-to-chest exercise    1. Lie on your back with your knees bent and your feet flat on the floor. 2. Bring one knee to your chest, keeping the other foot flat on the floor (or keeping the other leg straight, whichever feels better on your lower back). 3. Keep your lower back pressed to the floor. Hold for at least 15 to 30 seconds.   4. Relax, and lower the knee to the starting position. 5. Repeat with the other leg. Repeat 2 to 4 times with each leg. 6. To get more stretch, put your other leg flat on the floor while pulling your knee to your chest.    Curl-ups    1. Lie on the floor on your back with your knees bent at a 90-degree angle. Your feet should be flat on the floor, about 12 inches from your buttocks. 2. Cross your arms over your chest. If this bothers your neck, try putting your hands behind your neck (not your head), with your elbows spread apart. 3. Slowly tighten your belly muscles and raise your shoulder blades off the floor. 4. Keep your head in line with your body, and do not press your chin to your chest.  5. Hold this position for 1 or 2 seconds, then slowly lower yourself back down to the floor. 6. Repeat 8 to 12 times. Pelvic tilt exercise    1. Lie on your back with your knees bent. 2. \"Brace\" your stomach. This means to tighten your muscles by pulling in and imagining your belly button moving toward your spine. You should feel like your back is pressing to the floor and your hips and pelvis are rocking back. 3. Hold for about 6 seconds while you breathe smoothly. 4. Repeat 8 to 12 times. Heel dig bridging    1. Lie on your back with both knees bent and your ankles bent so that only your heels are digging into the floor. Your knees should be bent about 90 degrees. 2. Then push your heels into the floor, squeeze your buttocks, and lift your hips off the floor until your shoulders, hips, and knees are all in a straight line. 3. Hold for about 6 seconds as you continue to breathe normally, and then slowly lower your hips back down to the floor and rest for up to 10 seconds. 4. Do 8 to 12 repetitions. Hamstring stretch in doorway    1. Lie on your back in a doorway, with one leg through the open door. 2. Slide your leg up the wall to straighten your knee. You should feel a gentle stretch down the back of your leg.   3. Hold the stretch for at least 15 to 30 seconds. Do not arch your back, point your toes, or bend either knee. Keep one heel touching the floor and the other heel touching the wall. 4. Repeat with your other leg. 5. Do 2 to 4 times for each leg. Hip flexor stretch    1. Kneel on the floor with one knee bent and one leg behind you. Place your forward knee over your foot. Keep your other knee touching the floor. 2. Slowly push your hips forward until you feel a stretch in the upper thigh of your rear leg. 3. Hold the stretch for at least 15 to 30 seconds. Repeat with your other leg. 4. Do 2 to 4 times on each side. Wall sit    1. Stand with your back 10 to 12 inches away from a wall. 2. Lean into the wall until your back is flat against it. 3. Slowly slide down until your knees are slightly bent, pressing your lower back into the wall. 4. Hold for about 6 seconds, then slide back up the wall. 5. Repeat 8 to 12 times. Follow-up care is a key part of your treatment and safety. Be sure to make and go to all appointments, and call your doctor if you are having problems. It's also a good idea to know your test results and keep a list of the medicines you take. Where can you learn more? Go to http://cinda-jaime.info/. Enter F951 in the search box to learn more about \"Low Back Pain: Exercises. \"  Current as of: September 20, 2018  Content Version: 11.9  © 8822-9018 [employer]. Care instructions adapted under license by [employer] (which disclaims liability or warranty for this information). If you have questions about a medical condition or this instruction, always ask your healthcare professional. [place] [address] any warranty or liability for your use of this information. Acute Low Back Pain: Exercises  Your Care Instructions  Here are some examples of typical rehabilitation exercises for your condition. Start each exercise slowly.  Ease off the exercise if you start to have pain. Your doctor or physical therapist will tell you when you can start these exercises and which ones will work best for you. When you are not being active, find a comfortable position for rest. Some people are comfortable on the floor or a medium-firm bed with a small pillow under their head and another under their knees. Some people prefer to lie on their side with a pillow between their knees. Don't stay in one position for too long. Take short walks (10 to 20 minutes) every 2 to 3 hours. Avoid slopes, hills, and stairs until you feel better. Walk only distances you can manage without pain, especially leg pain. How to do the exercises  Back stretches    1. Get down on your hands and knees on the floor. 2. Relax your head and allow it to droop. Round your back up toward the ceiling until you feel a nice stretch in your upper, middle, and lower back. Hold this stretch for as long as it feels comfortable, or about 15 to 30 seconds. 3. Return to the starting position with a flat back while you are on your hands and knees. 4. Let your back sway by pressing your stomach toward the floor. Lift your buttocks toward the ceiling. 5. Hold this position for 15 to 30 seconds. 6. Repeat 2 to 4 times. Follow-up care is a key part of your treatment and safety. Be sure to make and go to all appointments, and call your doctor if you are having problems. It's also a good idea to know your test results and keep a list of the medicines you take. Where can you learn more? Go to http://cinda-jaime.info/. Enter I646 in the search box to learn more about \"Acute Low Back Pain: Exercises. \"  Current as of: September 20, 2018  Content Version: 11.9  © 3726-0288 [employer]. Care instructions adapted under license by [employer] (which disclaims liability or warranty for this information).  If you have questions about a medical condition or this instruction, always ask your healthcare professional. [place] [address] any warranty or liability for your use of this information. Learning About Relief for Back Pain  What is back tension and strain? Back strain happens when you overstretch, or pull, a muscle in your back. You may hurt your back in an accident or when you exercise or lift something. Most back pain will get better with rest and time. You can take care of yourself at home to help your back heal.  What can you do first to relieve back pain? When you first feel back pain, try these steps:  · Walk. Take a short walk (10 to 20 minutes) on a level surface (no slopes, hills, or stairs) every 2 to 3 hours. Walk only distances you can manage without pain, especially leg pain. · Relax. Find a comfortable position for rest. Some people are comfortable on the floor or a medium-firm bed with a small pillow under their head and another under their knees. Some people prefer to lie on their side with a pillow between their knees. Don't stay in one position for too long. · Try heat or ice. Try using a heating pad on a low or medium setting, or take a warm shower, for 15 to 20 minutes every 2 to 3 hours. Or you can buy single-use heat wraps that last up to 8 hours. You can also try an ice pack for 10 to 15 minutes every 2 to 3 hours. You can use an ice pack or a bag of frozen vegetables wrapped in a thin towel. There is not strong evidence that either heat or ice will help, but you can try them to see if they help. You may also want to try switching between heat and cold. · Take pain medicine exactly as directed. ? If the doctor gave you a prescription medicine for pain, take it as prescribed. ? If you are not taking a prescription pain medicine, ask your doctor if you can take an over-the-counter medicine. What else can you do? · Stretch and exercise.  Exercises that increase flexibility may relieve your pain and make it easier for your muscles to keep your spine in a good, neutral position. And don't forget to keep walking. · Do self-massage. You can use self-massage to unwind after work or school or to energize yourself in the morning. You can easily massage your feet, hands, or neck. Self-massage works best if you are in comfortable clothes and are sitting or lying in a comfortable position. Use oil or lotion to massage bare skin. · Reduce stress. Back pain can lead to a vicious [place]: Distress about the pain tenses the muscles in your back, which in turn causes more pain. Learn how to relax your mind and your muscles to lower your stress. Where can you learn more? Go to http://cinda-jaime.info/. Enter J278 in the search box to learn more about \"Learning About Relief for Back Pain. \"  Current as of: September 20, 2018  Content Version: 11.9  © 3310-0231 [employer]. Care instructions adapted under license by [employer] (which disclaims liability or warranty for this information). If you have questions about a medical condition or this instruction, always ask your healthcare professional. [address] any warranty or liability for your use of this information.

## 2019-06-06 NOTE — LETTER
NOTIFICATION RETURN TO WORK / SCHOOL 
 
6/6/2019 9:33 AM 
 
Ms. Abhishek Barreto [address] [facility] [address] [place] [id number] To Whom It May Concern: 
 
Abhishek Barreto is currently under the care of CHANTEL Waters. She will return to work/school on: 6/18/2019 If there are questions or concerns please have the patient contact our office.  
 
 
 
Sincerely, 
 
 
[facility], NP

## 2019-06-06 NOTE — PROGRESS NOTES
[facility] Note  Subjective:      Soledad Umanzor is a 62 y.o. female who presents for an acute visit with the following chief complaints. Chief Complaint   Patient presents with    Back Pain     pt states her back locked up 2 days ago. hasnt been to work in 2 days. no injury noted. Back Pain  Patient presents for presents evaluation of low  back problems. Symptoms have been present for 2 days, slightly better since onset. Initial inciting event: heavy lifting, and then woke up the next day with pain. Symptoms are worst: All day, constant   Alleviating factors identifiable by patient are bending forward, sitting, laying. Exacerbating factors identifiable by patient are bend, lifting. Associated left lateral hip pain started yesterday. Gait is off because of back pain. Treatments so far initiated by patient: position changes, ice/heating pad, staying active. She took tylenol once with little relief. Motrin once with some relief. Previous lower back problems: Yes history of low back pain in 2010, 2012 and 2015. Previous workup: [facility] evaluation with spine specialist Dr Ilsa Carver, XR normal in 2009. Previous treatments: Received cortisone injection, chiroprator, PT - all with relief. Red Flags:   Denies history of Trauma   Denies Unexplained weight loss  Denies Pain in chest or thoracic back pain   Denies Pain that persists after rest with laying down or pain worse at night   Denies Fever   Denies IV Drug use  Denies Steroid Use   Denies History of Cancer       Current Outpatient Medications   Medication Sig Dispense Refill    methocarbamol (ROBAXIN) 500 mg tablet Take 1 Tab by mouth two (2) times daily as needed for Pain. 15 Tab 0    amLODIPine (NORVASC) 5 mg tablet TAKE ONE TABLET BY MOUTH ONE TIME DAILY  90 Tab 1    pravastatin (PRAVACHOL) 20 mg tablet Take 1 Tab by mouth nightly.  30 Tab 3    ibuprofen (MOTRIN) 800 mg tablet Take 1 Tab by mouth every eight (8) hours as needed for Pain. 30 Tab 0    raNITIdine (ZANTAC) 150 mg tablet Take 1 Tab by mouth two (2) times a day. 180 Tab 3    CYANOCOBALAMIN, VITAMIN B-12, (LIQUID B 12 PO) Take  by mouth.  acetaminophen (TYLENOL) 325 mg tablet Take  by mouth every four (4) hours as needed for Pain.  coenzyme q10 (CO Q-10) 10 mg cap Take  by mouth.  fluticasone-vilanterol (BREO ELLIPTA) 100-25 mcg/dose inhaler Take 1 Puff by inhalation daily. 1 Inhaler 5    albuterol (PROVENTIL HFA, VENTOLIN HFA, PROAIR HFA) 90 mcg/actuation inhaler Take 1-2 Puffs by inhalation every four (4) hours as needed for Wheezing or Shortness of Breath. 1 Inhaler 2    omeprazole (PRILOSEC OTC) 20 mg tablet Take 20 mg by mouth daily.  EPIPEN 2-WAYNE 0.3 mg/0.3 mL (1:1,000) injection 0.3 mL by IntraMUSCular route once as needed for up to 1 dose. 2 Each 3    aspirin 81 mg tablet Take 81 mg by mouth daily.  ascorbic acid (VITAMIN C) 500 mg tablet Take 500 mg by mouth daily.  Cholecalciferol, Vitamin D3, (VITAMIN D3) 1,000 unit Cap Take 1 capsule by mouth daily.  multivitamin (ONE A DAY) tablet Take 1 Tab by mouth daily.  cetirizine (ALLER-FERNANDA) 10 mg tablet Take 1 Tab by mouth daily.  Prn allergies 90 Tab 3     Allergies   Allergen Reactions    Latex Swelling     Swelling in face when latex gloves were used at dentist office    Other Food Itching     Peas, raw broccoli, raw cauliflower, croutons    Hydrocodone-Acetaminophen Itching    Ultracet [Tramadol-Acetaminophen] Swelling     Facial and mouth    Biaxin [Clarithromycin] Itching and Nausea Only    Carrot Itching    Codeine Itching    Dilaudid [Hydromorphone] Itching and Nausea Only    Flexeril [Cyclobenzaprine] Itching    Naproxen Rash    Nucynta [Tapentadol] Itching    Oxycodone Swelling     Swelling of hands    Pcn [Penicillins] Rash    Peanut Shortness of Breath and Swelling     All nuts    Red Dye Itching and Swelling     #9    Shellfish Derived Swelling     shrimp    Strawberry Itching    Tramadol Hives       ROS:   Complete review of systems was reviewed with pertinent information listed in HPI. Review of Systems   Constitutional: Negative for chills, diaphoresis, fever, malaise/fatigue and weight loss. HENT: Negative for tinnitus. Eyes: Negative for blurred vision and double vision. Respiratory: Negative for cough, shortness of breath and wheezing. Cardiovascular: Negative for chest pain and palpitations. Gastrointestinal: Negative for abdominal pain, blood in stool, constipation, diarrhea, nausea and vomiting. No bowel or bladder incontinence   Genitourinary: Negative for dysuria, frequency, hematuria and urgency. Musculoskeletal: Positive for back pain. Negative for falls, joint pain, myalgias and neck pain. Skin: Negative for rash. Neurological: Negative for dizziness, tingling, tremors, sensory change, speech change, focal weakness, weakness and headaches. Psychiatric/Behavioral: Negative for depression. The patient is not nervous/anxious and does not have insomnia. Objective:     Visit Vitals  /76 (BP 1 Location: Left arm, BP Patient Position: Sitting)   Pulse 71   Temp 97.9 °F (36.6 °C)   Resp 18   Ht 5' 4\" (1.626 m)   Wt 241 lb (109.3 kg)   SpO2 100%   BMI 41.37 kg/m²       Vitals and Nurse Documentation reviewed. Physical Exam   Constitutional: She is oriented to person, place, and time and well-developed, well-nourished, and in no distress. HENT:   Head: Normocephalic and atraumatic. Eyes: Pupils are equal, round, and reactive to light. Conjunctivae and EOM are normal.   Neck: Normal range of motion. Neck supple. Cardiovascular: Normal rate. Pulmonary/Chest: Effort normal.   Abdominal: Normal appearance. There is no CVA tenderness. Musculoskeletal: Normal range of motion. She exhibits no edema or deformity.         Right hip: She exhibits normal range of motion, normal strength, no tenderness, no bony tenderness, no swelling, no crepitus and no deformity. Left hip: She exhibits tenderness. She exhibits normal range of motion, normal strength, no bony tenderness, no swelling, no crepitus and no deformity. Thoracic back: She exhibits normal range of motion, no tenderness, no swelling, no edema and no deformity. Lumbar back: She exhibits tenderness. She exhibits normal range of motion, no bony tenderness, no swelling, no edema, no deformity, no pain and no spasm. TTP of bilateral lower lumber back. TTP of left lateral hip. Negative straight leg raise bilaterally. No saddle anesthesia. Lymphadenopathy:     She has no cervical adenopathy. Neurological: She is alert and oriented to person, place, and time. She has normal sensation, normal strength, normal reflexes and intact cranial nerves. Gait normal. Gait normal.   Skin: She is not diaphoretic. Nursing note and vitals reviewed. Assessment/Plan:     Diagnoses and all orders for this visit:    1. Acute left-sided low back pain without sciatica: Acute non-traumatic low back pain. Recurrent issue. No neurovascular deficits on exam. Start conservative therapy; NSAID's, muscle relaxer nightly PRN, alternate heat/IC, start PT. RTC if symptoms worsen or do not resolve. Rx printed per request.   -     REFERRAL TO PHYSICAL THERAPY  -     methocarbamol (ROBAXIN) 500 mg tablet; Take 1 Tab by mouth two (2) times daily as needed for Pain. Follow-up and Dispositions    · Return if symptoms worsen or fail to improve.        Discussed expected course/resolution/complications of diagnosis in detail with patient.    Medication risks/benefits/costs/interactions/alternatives discussed with patient.    Pt was given an after visit summary which includes diagnoses, current medications & vitals.  Pt expressed understanding with the diagnosis and plan

## 2019-06-13 DIAGNOSIS — M54.50 ACUTE LEFT-SIDED LOW BACK PAIN WITHOUT SCIATICA: ICD-10-CM

## 2019-06-17 RX ORDER — METHOCARBAMOL 500 MG/1
TABLET, FILM COATED ORAL
Qty: 15 TAB | Refills: 0 | Status: SHIPPED | OUTPATIENT
Start: 2019-06-17 | End: 2021-01-14

## 2019-06-21 NOTE — LETTER
NOTIFICATION RETURN TO WORK / SCHOOL 
 
4/18/2018 4:13 PM 
 
Ms. Omid Mills [address] [facility] [address] [place] [address] To Whom It May Concern: 
 
Omid Mills is currently under the care of CHANTEL Waters. She will return to work/school on: 04/24/18 If there are questions or concerns please have the patient contact our office.  
 
 
 
Sincerely, 
 
 
Laverne Dickerson N.P. 
                                
 
 21-Jun-2019 08:13

## 2019-08-22 DIAGNOSIS — K21.00 GASTROESOPHAGEAL REFLUX DISEASE WITH ESOPHAGITIS: Chronic | ICD-10-CM

## 2019-08-22 DIAGNOSIS — I10 ESSENTIAL HYPERTENSION, BENIGN: ICD-10-CM

## 2019-08-22 RX ORDER — AMLODIPINE BESYLATE 5 MG/1
TABLET ORAL
Qty: 90 TAB | Refills: 1 | Status: SHIPPED | OUTPATIENT
Start: 2019-08-22 | End: 2020-02-10 | Stop reason: SDUPTHER

## 2019-08-22 RX ORDER — RANITIDINE 150 MG/1
TABLET, FILM COATED ORAL
Qty: 180 TAB | Refills: 0 | Status: SHIPPED | OUTPATIENT
Start: 2019-08-22 | End: 2019-09-10 | Stop reason: SDUPTHER

## 2019-08-22 RX ORDER — AMLODIPINE BESYLATE 5 MG/1
TABLET ORAL
Qty: 90 TAB | Refills: 0 | OUTPATIENT
Start: 2019-08-22

## 2019-08-22 NOTE — TELEPHONE ENCOUNTER
----- Message from Bellosharath Maria Del Carmen sent at 8/22/2019  9:59 AM EDT -----  Regarding: FRIDA MARTINEZ / REFILL   Medication Refill      Best contact number(s):  [phone]    \"AMIODIPINE\" 5 MG     Is patient out of this medication:  yes    [address] listed in chart    .   Requested Prescriptions     Pending Prescriptions Disp Refills    amLODIPine (NORVASC) 5 mg tablet 90 Tab 1     Refused Prescriptions Disp Refills    amLODIPine (NORVASC) 5 mg tablet [Pharmacy Med Name: amLODIPine Besylate Oral Tablet 5 MG] 90 Tab 0     Sig: TAKE 1 TABLET BY MOUTH ONE TIME DAILY     Refused By: Cheyanne Roque     Reason for Refusal: other                 Leanna Joyce

## 2019-08-22 NOTE — TELEPHONE ENCOUNTER
This does not have to be fasting, in fact a 2 hr PP test to discover diabetes( can check everything else then as well) is a good idea as it runs in the family. ..

## 2019-08-22 NOTE — TELEPHONE ENCOUNTER
appt rescheduled to 10/22/19 with Dr Libby Vilchis   Rx signed and sent to pharmacy per verbal order Dr Libby Vilchis

## 2019-08-22 NOTE — TELEPHONE ENCOUNTER
Tell her she needs to change her PCP to [facility] TORSTEN LLANOS as she is not attempting to see me in chronic follow up of her health( made appt in May for October with Russ) Change PCP to her unless she wants to reschedule with me

## 2019-09-09 DIAGNOSIS — K21.00 GASTROESOPHAGEAL REFLUX DISEASE WITH ESOPHAGITIS: Chronic | ICD-10-CM

## 2019-09-09 NOTE — TELEPHONE ENCOUNTER
PCP: Ashleigh Quiroz MD    Last appt: 6/6/2019  Future Appointments   Date Time Provider Nilda Lanza   10/22/2019  1:45 PM Ashleigh Quiroz MD [facility]       Requested Prescriptions     Pending Prescriptions Disp Refills    ACID REDUCER 150 mg tablet [Pharmacy Med Name: KLS Acid Reducer Max St Oral Tablet 150 MG] 190 Tab 0     Sig: TAKE 1 TABLET BY MOUTH TWICE DAILY

## 2019-09-10 RX ORDER — RANITIDINE 150 MG/1
TABLET, FILM COATED ORAL
Qty: 180 TAB | Refills: 0 | Status: SHIPPED | OUTPATIENT
Start: 2019-09-10 | End: 2020-02-14 | Stop reason: SDUPTHER

## 2019-09-10 RX ORDER — BISMUTH SUBSALICYLATE 262 MG
TABLET,CHEWABLE ORAL
Qty: 190 TAB | Refills: 0 | OUTPATIENT
Start: 2019-09-10

## 2019-09-10 NOTE — TELEPHONE ENCOUNTER
Patient Is caling stating that she lost the medication in the cart of "[employer].  requesting another refill be sent to "[employer]  .  Requested Prescriptions     Signed Prescriptions Disp Refills    raNITIdine (ZANTAC) 150 mg tablet 180 Tab 0     Sig: TAKE ONE TABLET BY MOUTH TWICE DAILY     Authorizing Provider: Patrizia Shin     Last refill 8/22/19  LOV :  June 06, 2019 08:45 AM

## 2019-10-14 ENCOUNTER — OFFICE VISIT (OUTPATIENT)
Dept: FAMILY MEDICINE CLINIC | Age: 54
End: 2019-10-14

## 2019-10-14 VITALS
DIASTOLIC BLOOD PRESSURE: 67 MMHG | HEART RATE: 70 BPM | WEIGHT: 235 LBS | TEMPERATURE: 98.4 F | OXYGEN SATURATION: 99 % | RESPIRATION RATE: 18 BRPM | SYSTOLIC BLOOD PRESSURE: 127 MMHG | BODY MASS INDEX: 40.12 KG/M2 | HEIGHT: 64 IN

## 2019-10-14 DIAGNOSIS — I10 ESSENTIAL HYPERTENSION, BENIGN: Primary | ICD-10-CM

## 2019-10-14 DIAGNOSIS — Z23 ENCOUNTER FOR IMMUNIZATION: ICD-10-CM

## 2019-10-14 DIAGNOSIS — E78.5 DYSLIPIDEMIA, GOAL LDL BELOW 130: ICD-10-CM

## 2019-10-14 DIAGNOSIS — Z12.31 VISIT FOR SCREENING MAMMOGRAM: ICD-10-CM

## 2019-10-14 NOTE — PROGRESS NOTES
Chief Complaint   Patient presents with    Hypertension     f/u     1. Have you been to the ER, urgent care clinic since your last visit? Hospitalized since your last visit? No    2. Have you seen or consulted any other health care providers outside of the [address] [place] [address] since your last visit? Include any pap smears or colon screening. Yes Where: [facility] 10/1/2019 DX: left knee pain  Milagro Cerna is a 62 y.o. female  who presents for routine immunizations. she denies any symptoms , reactions or allergies that would exclude them from being immunized today. Risks and adverse reactions were discussed and the VIS was given to them. All questions were addressed. she was observed for 10 min post injection. There were no reactions observed.     Odette John LPN

## 2019-10-14 NOTE — PATIENT INSTRUCTIONS
Vaccine Information Statement    Influenza (Flu) Vaccine (Inactivated or Recombinant): What You Need to Know    Many Vaccine Information Statements are available in [place]ian and other languages. See www.immunize.org/vis  Hojas de información sobre vacunas están disponibles en español y en muchos otros idiomas. Visite www.immunize.org/vis    1. Why get vaccinated? Influenza vaccine can prevent influenza (flu). Flu is a contagious disease that spreads around the [place] [facility] every year, usually between October and May. Anyone can get the flu, but it is more dangerous for some people. Infants and young children, people 72years of age and older, pregnant women, and people with certain health conditions or a weakened immune system are at greatest risk of flu complications. Pneumonia, bronchitis, sinus infections and ear infections are examples of flu-related complications. If you have a medical condition, such as heart disease, cancer or diabetes, flu can make it worse. Flu can cause fever and chills, sore throat, muscle aches, fatigue, cough, headache, and runny or stuffy nose. Some people may have vomiting and diarrhea, though this is more common in children than adults. Each year thousands of people in the [facility] die from flu, and many more are hospitalized. Flu vaccine prevents millions of illnesses and flu-related visits to the doctor each year. 2. Influenza vaccines     CDC recommends everyone 10months of age and older get vaccinated every flu season. Children 6 months through 6years of age may need 2 doses during a single flu season. Everyone else needs only 1 dose each flu season. It takes about 2 weeks for protection to develop after vaccination. There are many flu viruses, and they are always changing. Each year a new flu vaccine is made to protect against three or four viruses that are likely to cause disease in the upcoming flu season.  Even when the vaccine doesnt exactly match these viruses, it may still provide some protection. Influenza vaccine does not cause flu. Influenza vaccine may be given at the same time as other vaccines. 3. Talk with your health care provider    Tell your vaccine provider if the person getting the vaccine:   Has had an allergic reaction after a previous dose of influenza vaccine, or has any severe, life-threatening allergies.  Has ever had Guillain-Barré Syndrome (also called GBS). In some cases, your health care provider may decide to postpone influenza vaccination to a future visit. People with minor illnesses, such as a cold, may be vaccinated. People who are moderately or severely ill should usually wait until they recover before getting influenza vaccine. Your health care provider can give you more information. 4. Risks of a reaction     Soreness, redness, and swelling where shot is given, fever, muscle aches, and headache can happen after influenza vaccine.  There may be a very small increased risk of Guillain-Barré Syndrome (GBS) after inactivated influenza vaccine (the flu shot). Ranjit Mattes children who get the flu shot along with pneumococcal vaccine (PCV13), and/or DTaP vaccine at the same time might be slightly more likely to have a seizure caused by fever. Tell your health care provider if a child who is getting flu vaccine has ever had a seizure. People sometimes faint after medical procedures, including vaccination. Tell your provider if you feel dizzy or have vision changes or ringing in the ears. As with any medicine, there is a very remote chance of a vaccine causing a severe allergic reaction, other serious injury, or death. 5. What if there is a serious problem? An allergic reaction could occur after the vaccinated person leaves the clinic.  If you see signs of a severe allergic reaction (hives, swelling of the face and throat, difficulty breathing, a fast heartbeat, dizziness, or weakness), call 9-1-1 and get the person to the nearest hospital.    For other signs that concern you, call your health care provider. Adverse reactions should be reported to the Vaccine Adverse Event Reporting System (VAERS). Your health care provider will usually file this report, or you can do it yourself. Visit the VAERS website at www.vaers. [facility].gov or call 1-469.247.2397. VAERS is only for reporting reactions, and VAERS staff do not give medical advice. 6. The National Vaccine Injury Compensation Program    The [facility] Vaccine Injury Compensation Program (VICP) is a federal program that was created to compensate people who may have been injured by certain vaccines. Visit the VICP website at www.hrsa.gov/vaccinecompensation or call 9-672.239.5584 to learn about the program and about filing a claim. There is a time limit to file a claim for compensation. 7. How can I learn more?  Ask your health care provider.  Call your local or state health department.  Contact the Centers for Disease Control and Prevention (CDC):  - Call 2-465.890.4691 (1-800-CDC-INFO) or  - Visit CDCs influenza website at www.cdc.gov/flu    Vaccine Information Statement (Interim)  Inactivated Influenza Vaccine   8/15/2019  [address] 754FY-[id number]   Department of Health and Human Services  Centers for Disease Control and Prevention    Office Use Only

## 2019-10-15 ENCOUNTER — TELEPHONE (OUTPATIENT)
Dept: FAMILY MEDICINE CLINIC | Age: 54
End: 2019-10-15

## 2019-10-15 LAB
ALB/GLOBRATIO, 58C: 1.4 (CALC) (ref 1–2.5)
ALBUMIN SERPL-MCNC: 4.2 G/DL (ref 3.6–5.1)
ALP SERPL-CCNC: 115 U/L (ref 33–130)
ALT SERPL-CCNC: 16 U/L (ref 6–29)
AST SERPL W P-5'-P-CCNC: 16 U/L (ref 10–35)
BILIRUB SERPL-MCNC: 0.4 MG/DL (ref 0.2–1.2)
BUN SERPL-MCNC: 12 MG/DL (ref 7–25)
BUN/CREATININE RATIO,BUCR: NORMAL (CALC) (ref 6–22)
CALCIUM SERPL-MCNC: 9.8 MG/DL (ref 8.6–10.4)
CHLORIDE SERPL-SCNC: 103 MMOL/L (ref 98–110)
CHOL/HDL RATIO,CHHDX: 4 (CALC)
CHOLEST SERPL-MCNC: 247 MG/DL
CO2 SERPL-SCNC: 30 MMOL/L (ref 20–32)
CREAT SERPL-MCNC: 0.65 MG/DL (ref 0.5–1.05)
GLOBULIN,GLOB: 2.9 G/DL (CALC) (ref 1.9–3.7)
GLUCOSE SERPL-MCNC: 73 MG/DL (ref 65–99)
HDLC SERPL-MCNC: 61 MG/DL
LDL-CHOLESTEROL: 169 MG/DL (CALC)
NON-HDL CHOLESTEROL, 011976: 186 MG/DL (CALC)
POTASSIUM SERPL-SCNC: 4.4 MMOL/L (ref 3.5–5.3)
PROT SERPL-MCNC: 7.1 G/DL (ref 6.1–8.1)
SODIUM SERPL-SCNC: 143 MMOL/L (ref 135–146)
TRIGL SERPL-MCNC: 71 MG/DL (ref ?–150)

## 2019-10-15 RX ORDER — ROSUVASTATIN CALCIUM 5 MG/1
5 TABLET, COATED ORAL
Qty: 30 TAB | Refills: 3 | Status: SHIPPED | OUTPATIENT
Start: 2019-10-15 | End: 2020-02-10 | Stop reason: SDUPTHER

## 2019-10-15 NOTE — TELEPHONE ENCOUNTER
Called, spoke to pt. Two pt identifiers confirmed. Writer called and updated patient on recommendation on abnormal lab and new med order per NP, also informed patient that paper work will be mailed to her. Pt verbalized understanding of information discussed w/ no further questions at this time.

## 2019-12-30 DIAGNOSIS — Z12.31 VISIT FOR SCREENING MAMMOGRAM: ICD-10-CM

## 2020-02-10 ENCOUNTER — OFFICE VISIT (OUTPATIENT)
Dept: FAMILY MEDICINE CLINIC | Age: 55
End: 2020-02-10

## 2020-02-10 VITALS
OXYGEN SATURATION: 95 % | WEIGHT: 236.6 LBS | RESPIRATION RATE: 18 BRPM | HEART RATE: 62 BPM | DIASTOLIC BLOOD PRESSURE: 80 MMHG | HEIGHT: 64 IN | BODY MASS INDEX: 40.39 KG/M2 | TEMPERATURE: 98 F | SYSTOLIC BLOOD PRESSURE: 120 MMHG

## 2020-02-10 DIAGNOSIS — R73.02 GLUCOSE INTOLERANCE (IMPAIRED GLUCOSE TOLERANCE): ICD-10-CM

## 2020-02-10 DIAGNOSIS — E78.5 DYSLIPIDEMIA, GOAL LDL BELOW 130: ICD-10-CM

## 2020-02-10 DIAGNOSIS — E55.9 VITAMIN D DEFICIENCY: ICD-10-CM

## 2020-02-10 DIAGNOSIS — E66.01 OBESITY, CLASS III, BMI 40-49.9 (MORBID OBESITY) (HCC): ICD-10-CM

## 2020-02-10 DIAGNOSIS — Z00.00 GENERAL MEDICAL EXAMINATION: Primary | ICD-10-CM

## 2020-02-10 DIAGNOSIS — I10 ESSENTIAL HYPERTENSION, BENIGN: ICD-10-CM

## 2020-02-10 RX ORDER — AMLODIPINE BESYLATE 5 MG/1
TABLET ORAL
Qty: 90 TAB | Refills: 1 | Status: SHIPPED | OUTPATIENT
Start: 2020-02-10 | End: 2020-06-02 | Stop reason: SDUPTHER

## 2020-02-10 RX ORDER — LEVOCETIRIZINE DIHYDROCHLORIDE 5 MG/1
TABLET, FILM COATED ORAL
COMMUNITY

## 2020-02-10 RX ORDER — ROSUVASTATIN CALCIUM 5 MG/1
5 TABLET, COATED ORAL
Qty: 30 TAB | Refills: 3 | Status: SHIPPED | OUTPATIENT
Start: 2020-02-10 | End: 2020-06-02

## 2020-02-10 NOTE — PROGRESS NOTES
Subjective:   62 y.o. female for Well Woman Check. Her gyne and breast care is done elsewhere by her Ob-Gyne physician. Patient Active Problem List   Diagnosis Code    Allergic rhinitis J30.9    Asthma J45.909    GERD (gastroesophageal reflux disease) K21.9    Essential hypertension, benign I10    Obesity, Class III, BMI 40-49.9 (morbid obesity) ([facility]) E66.01    Back pain at L4-L5 level M54.5    Long term current use of systemic steroids Z79.52    History of asthma Z87.09    Hyperlipidemia LDL goal <130 E78.5    Dyslipidemia, goal LDL below 130 E78.5    Vitamin D deficiency E55.9     Patient Active Problem List    Diagnosis Date Noted    Vitamin D deficiency 07/21/2017    Dyslipidemia, goal LDL below 130 07/18/2017    History of asthma 05/18/2017    Hyperlipidemia LDL goal <130 05/18/2017    Long term current use of systemic steroids 01/20/2016    Back pain at L4-L5 level 05/26/2015    Obesity, Class III, BMI 40-49.9 (morbid obesity) ([facility] [address] 09/19/2012    Essential hypertension, benign 07/06/2010    Allergic rhinitis     Asthma     GERD (gastroesophageal reflux disease)      Current Outpatient Medications   Medication Sig Dispense Refill    omega 3-dha-epa-fish oil (FISH OIL) 100-160-1,000 mg cap Take  by mouth.  levocetirizine (XYZAL) 5 mg tablet Take  by mouth.  amLODIPine (NORVASC) 5 mg tablet TAKE ONE TABLET BY MOUTH ONE TIME DAILY 90 Tab 1    rosuvastatin (CRESTOR) 5 mg tablet Take 1 Tab by mouth nightly. 30 Tab 3    cetirizine (ALLER-FERNANDA) 10 mg tablet Take 1 Tab by mouth daily. Prn allergies 90 Tab 3    ibuprofen (MOTRIN) 800 mg tablet Take 1 Tab by mouth every eight (8) hours as needed for Pain. 30 Tab 0    raNITIdine (ZANTAC) 150 mg tablet Take 1 Tab by mouth two (2) times a day. 180 Tab 3    albuterol (PROVENTIL HFA, VENTOLIN HFA, PROAIR HFA) 90 mcg/actuation inhaler Take 1-2 Puffs by inhalation every four (4) hours as needed for Wheezing or Shortness of Breath.  1 Inhaler 2    omeprazole (PRILOSEC OTC) 20 mg tablet Take 20 mg by mouth daily.  aspirin 81 mg tablet Take 81 mg by mouth daily.  multivitamin (ONE A DAY) tablet Take 1 Tab by mouth daily.  raNITIdine (ZANTAC) 150 mg tablet TAKE ONE TABLET BY MOUTH TWICE DAILY 180 Tab 0    methocarbamol (ROBAXIN) 500 mg tablet TAKE 1 TABLET BY MOUTH TWICE DAILY AS NEEDED FOR PAIN 15 Tab 0    CYANOCOBALAMIN, VITAMIN B-12, (LIQUID B 12 PO) Take  by mouth.  acetaminophen (TYLENOL) 325 mg tablet Take  by mouth every four (4) hours as needed for Pain.  coenzyme q10 (CO Q-10) 10 mg cap Take  by mouth.  fluticasone-vilanterol (BREO ELLIPTA) 100-25 mcg/dose inhaler Take 1 Puff by inhalation daily. 1 Inhaler 5    EPIPEN 2-WAYNE 0.3 mg/0.3 mL (1:1,000) injection 0.3 mL by IntraMUSCular route once as needed for up to 1 dose. 2 Each 3    ascorbic acid (VITAMIN C) 500 mg tablet Take 500 mg by mouth daily.  Cholecalciferol, Vitamin D3, (VITAMIN D3) 1,000 unit Cap Take 1 capsule by mouth daily.        Allergies   Allergen Reactions    Latex Swelling     Swelling in face when latex gloves were used at dentist office    Other Food Itching     Peas, raw broccoli, raw cauliflower, croutons    Hydrocodone-Acetaminophen Itching    Ultracet [Tramadol-Acetaminophen] Swelling     Facial and mouth    Biaxin [Clarithromycin] Itching and Nausea Only    Carrot Itching    Codeine Itching    Dilaudid [Hydromorphone] Itching and Nausea Only    Flexeril [Cyclobenzaprine] Itching    Naproxen Rash    Nucynta [Tapentadol] Itching    Oxycodone Swelling     Swelling of hands    Pcn [Penicillins] Rash    Peanut Shortness of Breath and Swelling     All nuts    Red Dye Itching and Swelling     #9    Shellfish Derived Swelling     shrimp    Strawberry Itching    Tramadol Hives     Past Medical History:   Diagnosis Date    Abscess 9/6/2012    Allergic rhinitis     Angioedema     Asthma     Back pain 12/22/2009    Dyslipidemia, goal LDL below 130 2017    GERD (gastroesophageal reflux disease)     History of asthma 2017    Hyperlipidemia LDL goal <130 2017    Hypertension     Vitamin D deficiency 2017     Past Surgical History:   Procedure Laterality Date    HX BREAST BIOPSY Right     BENIGN    HX DILATION AND CURETTAGE      HX GI      COLONOSCOPY    HX GYN  , 2002        HX ORTHOPAEDIC Bilateral     carpal tunnel    HX OTHER SURGICAL  2012    boil(buttock) excised by Dr Abram Perdue  2/4/15    Excision of left buttock abscess by Dr. Jason Liang     Family History   Problem Relation Age of Onset    Stroke Mother     Hypertension Mother     Cancer Father         PROSTATE    Diabetes Sister     Cancer Maternal Aunt         BCA    Hypertension Brother     Cancer Other     Cancer Other     Cancer Other     Cancer Paternal Grandmother         pancreatic ca    Anesth Problems Neg Hx      Social History     Tobacco Use    Smoking status: Never Smoker    Smokeless tobacco: Never Used   Substance Use Topics    Alcohol use: Yes     Alcohol/week: 0.0 standard drinks     Types: 1 Standard drinks or equivalent per week     Comment: OCC. Specific concerns today: BMI:  40.61    Review of Systems  A comprehensive review of systems was negative except for that written in the HPI. Objective:   Blood pressure 120/80, pulse 62, temperature 98 °F (36.7 °C), temperature source Oral, resp. rate 18, height 5' 4\" (1.626 m), weight 236 lb 9.6 oz (107.3 kg), SpO2 95 %.    Physical Examination:   General appearance - alert, well appearing, and in no distress  Mental status - alert, oriented to person, place, and time  Eyes - pupils equal and reactive, extraocular eye movements intact  Ears - bilateral TM's and external ear canals normal  Nose - normal and patent, no erythema, discharge or polyps  Mouth - mucous membranes moist, pharynx normal without lesions  Neck - supple, no significant adenopathy  Lymphatics - no palpable lymphadenopathy, no hepatosplenomegaly  Chest - clear to auscultation, no wheezes, rales or rhonchi, symmetric air entry  Heart - normal rate, regular rhythm, normal S1, S2, no murmurs, rubs, clicks or gallops  Abdomen - soft, nontender, nondistended, no masses or organomegaly  Back exam - full range of motion, no tenderness, palpable spasm or pain on motion  Neurological - alert, oriented, normal speech, no focal findings or movement disorder noted  Musculoskeletal - no joint tenderness, deformity or swelling  Extremities - peripheral pulses normal, no pedal edema, no clubbing or cyanosis  Skin - normal coloration and turgor, no rashes, no suspicious skin lesions noted     Assessment/Plan:   lose weight, increase physical activity, follow low fat diet, follow low salt diet, routine labs ordered    ICD-10-CM ICD-9-CM    1. General medical examination Z00.00 V70.9 T4, FREE      TSH 3RD GENERATION      CANCELED: TSH 3RD GENERATION      CANCELED: T4, FREE   2. Essential hypertension, benign I10 401.1 amLODIPine (NORVASC) 5 mg tablet      CBC W/O DIFF      CANCELED: METABOLIC PANEL, COMPREHENSIVE      CANCELED: CBC W/O DIFF   3. Dyslipidemia, goal LDL below 130 E78.5 272.4 rosuvastatin (CRESTOR) 5 mg tablet      LIPID PANEL      METABOLIC PANEL, COMPREHENSIVE      CANCELED: LIPID PANEL   4. Obesity, Class III, BMI 40-49.9 (morbid obesity) ([facility]) E66.01 278.01    5.  Vitamin D deficiency E55.9 268.9 VITAMIN D, 25 HYDROXY      CANCELED: VITAMIN D, 25 HYDROXY   6. Glucose intolerance (impaired glucose tolerance) R73.02 790.22 HEMOGLOBIN A1C WITH EAG      CANCELED: HEMOGLOBIN A1C WITH EAG   follow up in six months  Pt was given an after visit summary which includes diagnosis, current medicines and vital and voiced understanding of treatment plan

## 2020-02-10 NOTE — PROGRESS NOTES
Chief Complaint   Patient presents with    Complete Physical     fast     1. Have you been to the ER, urgent care clinic since your last visit? Hospitalized since your last visit? No    2. Have you seen or consulted any other health care providers outside of the [address] [place] [address] since your last visit? Include any pap smears or colon screening.  no

## 2020-02-11 LAB
25(OH)D3 SERPL-MCNC: 31 NG/ML (ref 30–100)
ABSOLUTE BANDS, 67058: NORMAL
ABSOLUTE BLASTS: NORMAL
ABSOLUTE METAMYELOCYTES, 900360: NORMAL
ABSOLUTE MYELOCYTES: NORMAL
ABSOLUTE NRBC,ANRBC: NORMAL
ABSOLUTE PROMYELOCYTES: NORMAL
ALB/GLOBRATIO, 58C: 1.5 (CALC) (ref 1–2.5)
ALBUMIN SERPL-MCNC: 4.3 G/DL (ref 3.6–5.1)
ALP SERPL-CCNC: 112 U/L (ref 37–153)
ALT SERPL-CCNC: 20 U/L (ref 6–29)
AST SERPL W P-5'-P-CCNC: 22 U/L (ref 10–35)
BANDS,BANDS: NORMAL
BASOPHILS # BLD: 21 CELLS/UL (ref 0–200)
BASOPHILS NFR BLD: 0.4 %
BILIRUB SERPL-MCNC: 0.5 MG/DL (ref 0.2–1.2)
BLASTS,BLAST: NORMAL
BUN SERPL-MCNC: 11 MG/DL (ref 7–25)
BUN/CREATININE RATIO,BUCR: ABNORMAL (CALC) (ref 6–22)
CALCIUM SERPL-MCNC: 9.8 MG/DL (ref 8.6–10.4)
CHLORIDE SERPL-SCNC: 104 MMOL/L (ref 98–110)
CHOL/HDL RATIO,CHHDX: 2.3 (CALC)
CHOLEST SERPL-MCNC: 161 MG/DL
CO2 SERPL-SCNC: 34 MMOL/L (ref 20–32)
COMMENT(S): NORMAL
CREAT SERPL-MCNC: 0.76 MG/DL (ref 0.5–1.05)
EAG (MG/DL),9916804: 120 (CALC)
EAG (MMOL/L),9916805: 6.6 (CALC)
EOSINOPHIL # BLD: 250 CELLS/UL (ref 15–500)
EOSINOPHIL NFR BLD: 4.8 %
ERYTHROCYTE [DISTWIDTH] IN BLOOD BY AUTOMATED COUNT: 13.7 % (ref 11–15)
GLOBULIN,GLOB: 2.9 G/DL (CALC) (ref 1.9–3.7)
GLUCOSE SERPL-MCNC: 81 MG/DL (ref 65–99)
HBA1C MFR BLD HPLC: 5.8 % OF TOTAL HGB
HCT VFR BLD AUTO: 38.4 % (ref 35–45)
HDLC SERPL-MCNC: 71 MG/DL
HGB BLD-MCNC: 12.5 G/DL (ref 11.7–15.5)
LDL-CHOLESTEROL: 77 MG/DL (CALC)
LYMPHOCYTES # BLD: 2688 CELLS/UL (ref 850–3900)
LYMPHOCYTES NFR BLD: 51.7 %
MCH RBC QN AUTO: 27.1 PG (ref 27–33)
MCHC RBC AUTO-ENTMCNC: 32.6 G/DL (ref 32–36)
MCV RBC AUTO: 83.3 FL (ref 80–100)
METAMYELOCYTES,METAS: NORMAL
MONOCYTES # BLD: 359 CELLS/UL (ref 200–950)
MONOCYTES NFR BLD: 6.9 %
MYELOCYTES,MYELO: NORMAL
NEUTROPHILS # BLD AUTO: 1882 CELLS/UL (ref 1500–7800)
NEUTROPHILS # BLD: 36.2 %
NON-HDL CHOLESTEROL, 011976: 90 MG/DL (CALC)
NRBC: NORMAL
PLATELET # BLD AUTO: 319 THOUSAND/UL (ref 140–400)
PMV BLD AUTO: 10.1 FL (ref 7.5–12.5)
POTASSIUM SERPL-SCNC: 3.8 MMOL/L (ref 3.5–5.3)
PROMYELOCYTES,PRO: NORMAL
PROT SERPL-MCNC: 7.2 G/DL (ref 6.1–8.1)
RBC # BLD AUTO: 4.61 MILLION/UL (ref 3.8–5.1)
REACTIVE LYMPHS: NORMAL
SODIUM SERPL-SCNC: 141 MMOL/L (ref 135–146)
T4 FREE SERPL-MCNC: 1.2 NG/DL (ref 0.8–1.8)
TRIGL SERPL-MCNC: 54 MG/DL (ref ?–150)
TSH SERPL DL<=0.005 MIU/L-ACNC: 0.44 MIU/L (ref 0.4–4.5)
WBC # BLD AUTO: 5.2 THOUSAND/UL (ref 3.8–10.8)

## 2020-02-14 ENCOUNTER — OFFICE VISIT (OUTPATIENT)
Dept: FAMILY MEDICINE CLINIC | Age: 55
End: 2020-02-14

## 2020-02-14 VITALS
TEMPERATURE: 99.6 F | RESPIRATION RATE: 18 BRPM | BODY MASS INDEX: 39.78 KG/M2 | OXYGEN SATURATION: 100 % | WEIGHT: 233 LBS | HEART RATE: 88 BPM | SYSTOLIC BLOOD PRESSURE: 130 MMHG | HEIGHT: 64 IN | DIASTOLIC BLOOD PRESSURE: 82 MMHG

## 2020-02-14 DIAGNOSIS — R68.89 FLU-LIKE SYMPTOMS: Primary | ICD-10-CM

## 2020-02-14 DIAGNOSIS — J06.9 ACUTE URI: ICD-10-CM

## 2020-02-14 DIAGNOSIS — R05.9 COUGH IN ADULT: ICD-10-CM

## 2020-02-14 LAB
QUICKVUE INFLUENZA TEST: NEGATIVE
VALID INTERNAL CONTROL?: YES

## 2020-02-14 RX ORDER — CIPROFLOXACIN 500 MG/1
500 TABLET ORAL 2 TIMES DAILY
Qty: 20 TAB | Refills: 0 | Status: SHIPPED | OUTPATIENT
Start: 2020-02-14 | End: 2020-02-24

## 2020-02-14 NOTE — LETTER
NOTIFICATION RETURN TO WORK / SCHOOL 
 
2/14/2020 9:15 AM 
 
Ms. Shaylee Cho [address] [facility] [address] [place] [address] To Whom It May Concern: 
 
Shaylee Cho is currently under the care of CHANTEL Waters. She will return to work 2/15/2020 If there are questions or concerns please have the patient contact our office. Sincerely, Bao Hicks NP

## 2020-02-14 NOTE — PROGRESS NOTES
HISTORY OF PRESENT ILLNESS  Armani Daugherty is a 62 y.o. female. HPI: Patient is complaining on body aches,  nasal congestion, yellow nasal discharge x dry cough x 3-4 days . Denies chills,and fever. She is not taking any medication for cough or congestion. Past Medical History:   Diagnosis Date    Abscess 2012    Allergic rhinitis     Angioedema     Asthma     Back pain 2009    Dyslipidemia, goal LDL below 130 2017    GERD (gastroesophageal reflux disease)     History of asthma 2017    Hyperlipidemia LDL goal <130 2017    Hypertension     Vitamin D deficiency 2017     Past Surgical History:   Procedure Laterality Date    HX BREAST BIOPSY Right     BENIGN    HX DILATION AND CURETTAGE      HX GI      COLONOSCOPY    HX GYN  , 2002        HX ORTHOPAEDIC Bilateral     carpal tunnel    HX OTHER SURGICAL  2012    boil(buttock) excised by Dr Ramon Matson  2/4/15    Excision of left buttock abscess by Dr. Gato Moreland     Allergies   Allergen Reactions    Latex Swelling     Swelling in face when latex gloves were used at dentist office    Other Food Itching     Peas, raw broccoli, raw cauliflower, croutons    Hydrocodone-Acetaminophen Itching    Ultracet [Tramadol-Acetaminophen] Swelling     Facial and mouth    Biaxin [Clarithromycin] Itching and Nausea Only    Carrot Itching    Codeine Itching    Dilaudid [Hydromorphone] Itching and Nausea Only    Flexeril [Cyclobenzaprine] Itching    Naproxen Rash    Nucynta [Tapentadol] Itching    Oxycodone Swelling     Swelling of hands    Pcn [Penicillins] Rash    Peanut Shortness of Breath and Swelling     All nuts    Red Dye Itching and Swelling     #9    Shellfish Derived Swelling     shrimp    Strawberry Itching    Tramadol Hives     Current Outpatient Medications:     ciprofloxacin HCl (CIPRO) 500 mg tablet, Take 1 Tab by mouth two (2) times a day for 10 days. , Disp: 20 Tab, Rfl: 0    guaiFENesin-dextromethorphan SR (MUCINEX DM) 600-30 mg per tablet, Take 1 Tab by mouth two (2) times a day., Disp: 20 Tab, Rfl: 1    omega 3-dha-epa-fish oil (FISH OIL) 100-160-1,000 mg cap, Take  by mouth., Disp: , Rfl:     levocetirizine (XYZAL) 5 mg tablet, Take  by mouth., Disp: , Rfl:     amLODIPine (NORVASC) 5 mg tablet, TAKE ONE TABLET BY MOUTH ONE TIME DAILY, Disp: 90 Tab, Rfl: 1    rosuvastatin (CRESTOR) 5 mg tablet, Take 1 Tab by mouth nightly., Disp: 30 Tab, Rfl: 3    methocarbamol (ROBAXIN) 500 mg tablet, TAKE 1 TABLET BY MOUTH TWICE DAILY AS NEEDED FOR PAIN, Disp: 15 Tab, Rfl: 0    cetirizine (ALLER-FERNANDA) 10 mg tablet, Take 1 Tab by mouth daily. Prn allergies, Disp: 90 Tab, Rfl: 3    ibuprofen (MOTRIN) 800 mg tablet, Take 1 Tab by mouth every eight (8) hours as needed for Pain., Disp: 30 Tab, Rfl: 0    raNITIdine (ZANTAC) 150 mg tablet, Take 1 Tab by mouth two (2) times a day., Disp: 180 Tab, Rfl: 3    CYANOCOBALAMIN, VITAMIN B-12, (LIQUID B 12 PO), Take  by mouth., Disp: , Rfl:     coenzyme q10 (CO Q-10) 10 mg cap, Take  by mouth., Disp: , Rfl:     fluticasone-vilanterol (BREO ELLIPTA) 100-25 mcg/dose inhaler, Take 1 Puff by inhalation daily. , Disp: 1 Inhaler, Rfl: 5    albuterol (PROVENTIL HFA, VENTOLIN HFA, PROAIR HFA) 90 mcg/actuation inhaler, Take 1-2 Puffs by inhalation every four (4) hours as needed for Wheezing or Shortness of Breath., Disp: 1 Inhaler, Rfl: 2    omeprazole (PRILOSEC OTC) 20 mg tablet, Take 20 mg by mouth daily. , Disp: , Rfl:     EPIPEN 2-WAYNE 0.3 mg/0.3 mL (1:1,000) injection, 0.3 mL by IntraMUSCular route once as needed for up to 1 dose., Disp: 2 Each, Rfl: 3    aspirin 81 mg tablet, Take 81 mg by mouth daily. , Disp: , Rfl:     ascorbic acid (VITAMIN C) 500 mg tablet, Take 500 mg by mouth daily. , Disp: , Rfl:     Cholecalciferol, Vitamin D3, (VITAMIN D3) 1,000 unit Cap, Take 1 capsule by mouth daily. , Disp: , Rfl:     multivitamin (ONE A DAY) tablet, Take 1 Tab by mouth daily. , Disp: , Rfl:     Review of Systems   Constitutional: Positive for malaise/fatigue. HENT: Positive for congestion and sinus pain. Respiratory: Positive for cough. Cardiovascular: Negative. Gastrointestinal: Negative. Blood pressure 130/82, pulse 88, temperature 99.6 °F (37.6 °C), temperature source Oral, resp. rate 18, height 5' 4\" (1.626 m), weight 233 lb (105.7 kg), SpO2 100 %. Body mass index is 39.99 kg/m². Physical Exam  Constitutional:       Appearance: She is obese. HENT:      Nose: Congestion and rhinorrhea present. Mouth/Throat:      Pharynx: No oropharyngeal exudate or posterior oropharyngeal erythema. Neck:      Musculoskeletal: Normal range of motion and neck supple. Cardiovascular:      Rate and Rhythm: Normal rate and regular rhythm. Pulses: Normal pulses. Heart sounds: Normal heart sounds. No murmur. Pulmonary:      Effort: Pulmonary effort is normal.      Breath sounds: Normal breath sounds. Comments: Flu test is negative  Abdominal:      General: Bowel sounds are normal.      Palpations: Abdomen is soft. Neurological:      Mental Status: She is alert. ASSESSMENT and PLAN    ICD-10-CM ICD-9-CM    1. Flu-like symptoms R68.89 780.99 AMB POC RAPID INFLUENZA TEST   2. Acute URI J06.9 465.9 ciprofloxacin HCl (CIPRO) 500 mg tablet   3.  Cough in adult R05 786.2 guaiFENesin-dextromethorphan SR (MUCINEX DM) 600-30 mg per tablet   Pt was given an after visit summary which includes diagnosis, current medicines and vital and voiced understanding of treatment plan

## 2020-02-14 NOTE — PROGRESS NOTES
Chief Complaint   Patient presents with    Cold Symptoms     Patient is in the office today with complaints of persistent cough, nasal congestion, chills, and body aches. Denies fever , and throat pain. 1. Have you been to the ER, urgent care clinic since your last visit? Hospitalized since your last visit? No    2. Have you seen or consulted any other health care providers outside of the [address] [place] [address] since your last visit? Include any pap smears or colon screening.  No

## 2020-03-07 ENCOUNTER — OFFICE VISIT (OUTPATIENT)
Dept: URGENT CARE | Age: 55
End: 2020-03-07

## 2020-03-07 VITALS
RESPIRATION RATE: 16 BRPM | HEIGHT: 63 IN | OXYGEN SATURATION: 99 % | HEART RATE: 83 BPM | DIASTOLIC BLOOD PRESSURE: 64 MMHG | TEMPERATURE: 98.3 F | BODY MASS INDEX: 41.82 KG/M2 | SYSTOLIC BLOOD PRESSURE: 138 MMHG | WEIGHT: 236 LBS

## 2020-03-07 DIAGNOSIS — J01.10 ACUTE FRONTAL SINUSITIS, RECURRENCE NOT SPECIFIED: Primary | ICD-10-CM

## 2020-03-07 RX ORDER — DOXYCYCLINE 100 MG/1
100 TABLET ORAL 2 TIMES DAILY
Qty: 20 TAB | Refills: 0 | Status: SHIPPED | OUTPATIENT
Start: 2020-03-07 | End: 2020-03-17

## 2020-03-07 NOTE — PATIENT INSTRUCTIONS
Sinusitis: Care Instructions Your Care Instructions Sinusitis is an infection of the lining of the sinus cavities in your head. Sinusitis often follows a cold. It causes pain and pressure in your head and face. In most cases, sinusitis gets better on its own in 1 to 2 weeks. But some mild symptoms may last for several weeks. Sometimes antibiotics are needed. Follow-up care is a key part of your treatment and safety. Be sure to make and go to all appointments, and call your doctor if you are having problems. It's also a good idea to know your test results and keep a list of the medicines you take. How can you care for yourself at home? · Take an over-the-counter pain medicine, such as acetaminophen (Tylenol), ibuprofen (Advil, Motrin), or naproxen (Aleve). Read and follow all instructions on the label. · If the doctor prescribed antibiotics, take them as directed. Do not stop taking them just because you feel better. You need to take the full course of antibiotics. · Be careful when taking over-the-counter cold or flu medicines and Tylenol at the same time. Many of these medicines have acetaminophen, which is Tylenol. Read the labels to make sure that you are not taking more than the recommended dose. Too much acetaminophen (Tylenol) can be harmful. · Breathe warm, moist air from a steamy shower, a hot bath, or a sink filled with hot water. Avoid cold, dry air. Using a humidifier in your home may help. Follow the directions for cleaning the machine. · Use saline (saltwater) nasal washes to help keep your nasal passages open and wash out mucus and bacteria. You can buy saline nose drops at a grocery store or drugstore. Or you can make your own at home by adding 1 teaspoon of salt and 1 teaspoon of baking soda to 2 cups of distilled water. If you make your own, fill a bulb syringe with the solution, insert the tip into your nostril, and squeeze gently. Verneice Root your nose. · Put a hot, wet towel or a warm gel pack on your face 3 or 4 times a day for 5 to 10 minutes each time. · Try a decongestant nasal spray like oxymetazoline (Afrin). Do not use it for more than 3 days in a row. Using it for more than 3 days can make your congestion worse. When should you call for help? Call your doctor now or seek immediate medical care if: 
  · You have new or worse swelling or redness in your face or around your eyes.  
  · You have a new or higher fever.  
 Watch closely for changes in your health, and be sure to contact your doctor if: 
  · You have new or worse facial pain.  
  · The mucus from your nose becomes thicker (like pus) or has new blood in it.  
  · You are not getting better as expected. Where can you learn more? Go to http://cinda-jaime.info/. Enter K997 in the search box to learn more about \"Sinusitis: Care Instructions. \" Current as of: October 21, 2018 Content Version: 12.2 © 2302-3504 [employer]. Care instructions adapted under license by [employer] (which disclaims liability or warranty for this information). If you have questions about a medical condition or this instruction, always ask your healthcare professional. [address] any warranty or liability for your use of this information.

## 2020-03-07 NOTE — PROGRESS NOTES
Right ear popping  With sinus congestion and headache for  One week. She has taken mucinex DM with no relief. Past Medical History:   Diagnosis Date    Abscess 2012    Allergic rhinitis     Angioedema     Asthma     Back pain 2009    Dyslipidemia, goal LDL below 130 2017    GERD (gastroesophageal reflux disease)     History of asthma 2017    Hyperlipidemia LDL goal <130 2017    Hypertension     Vitamin D deficiency 2017        Past Surgical History:   Procedure Laterality Date    HX BREAST BIOPSY Right     BENIGN    HX DILATION AND CURETTAGE      HX GI      COLONOSCOPY    HX GYN  , 2002        HX ORTHOPAEDIC Bilateral     carpal tunnel    HX OTHER SURGICAL  2012    boil(buttock) excised by Dr Jorden Lombard  2/4/15    Excision of left buttock abscess by Dr. Hector Goldstein         Family History   Problem Relation Age of Onset    Stroke Mother     Hypertension Mother     Cancer Father         PROSTATE    Diabetes Sister     Cancer Maternal Aunt         BCA    Hypertension Brother     Cancer Other     Cancer Other     Cancer Other     Cancer Paternal Grandmother         pancreatic ca    Anesth Problems Neg Hx         Social History     Socioeconomic History    Marital status:      Spouse name: Not on file    Number of children: Not on file    Years of education: Not on file    Highest education level: Not on file   Occupational History    Occupation: [employer]   Social Needs    Financial resource strain: Not on file    Food insecurity:     Worry: Not on file     Inability: Not on file    Transportation needs:     Medical: Not on file     Non-medical: Not on file   Tobacco Use    Smoking status: Never Smoker    Smokeless tobacco: Never Used   Substance and Sexual Activity    Alcohol use:  Yes     Alcohol/week: 0.0 standard drinks     Types: 1 Standard drinks or equivalent per week     Comment: OCC.    Drug use: No    Sexual activity: Yes     Partners: Male     Birth control/protection: Pill   Lifestyle    Physical activity:     Days per week: Not on file     Minutes per session: Not on file    Stress: Not on file   Relationships    Social connections:     Talks on phone: Not on file     Gets together: Not on file     Attends [employer] service: Not on file     Active member of club or organization: Not on file     Attends meetings of clubs or organizations: Not on file     Relationship status: Not on file    Intimate partner violence:     Fear of current or ex partner: Not on file     Emotionally abused: Not on file     Physically abused: Not on file     Forced sexual activity: Not on file   Other Topics Concern     Service Not Asked    Blood Transfusions Not Asked    Caffeine Concern Not Asked    Occupational Exposure Not Asked   [place] Hazards Not Asked    Sleep Concern Not Asked    Stress Concern Not Asked    Weight Concern Not Asked    Special Diet Not Asked    Back Care Not Asked    Exercise No    Bike Helmet Not Asked   [address] Not Asked    Self-Exams Not Asked   Social History Narrative    Not on file                ALLERGIES: Latex; Other food; Hydrocodone-acetaminophen; Ultracet [tramadol-acetaminophen]; Biaxin [clarithromycin]; Carrot; Codeine; Dilaudid [hydromorphone]; Flexeril [cyclobenzaprine]; Naproxen; Nucynta [tapentadol]; Oxycodone; Pcn [penicillins]; Peanut; Red dye; Shellfish derived; Strawberry; and Tramadol    Review of Systems   Constitutional: Negative for fever. HENT: Positive for sinus pressure. Negative for ear discharge, ear pain and sore throat.         Vitals:    03/07/20 1530   BP: 152/89   Pulse: 83   Resp: 16   Temp: 98.3 °F (36.8 °C)   SpO2: 99%   Weight: 236 lb (107 kg)   Height: 5' 3\" (1.6 m)       Physical Exam  HENT:      Head:      Comments: Non sinus tenderness     Right Ear: Tympanic membrane and ear canal normal.      Left Ear: Tympanic membrane and ear canal normal.      Mouth/Throat:      Mouth: Mucous membranes are moist.      Pharynx: Oropharynx is clear. Eyes:      General:         Right eye: No discharge. Left eye: No discharge. Skin:     General: Skin is warm. Neurological:      Mental Status: She is alert. MDM     Differential Diagnosis; Clinical Impression; Plan:     Sinusitis and will treat with antibiotics due to length of illness. Will f/u with pcp for BP.   Progress:   Patient progress:  Stable      Procedures

## 2020-03-16 ENCOUNTER — OFFICE VISIT (OUTPATIENT)
Dept: FAMILY MEDICINE CLINIC | Age: 55
End: 2020-03-16

## 2020-03-16 VITALS
DIASTOLIC BLOOD PRESSURE: 73 MMHG | TEMPERATURE: 97.7 F | OXYGEN SATURATION: 98 % | SYSTOLIC BLOOD PRESSURE: 136 MMHG | BODY MASS INDEX: 41.6 KG/M2 | RESPIRATION RATE: 18 BRPM | WEIGHT: 234.8 LBS | HEIGHT: 63 IN | HEART RATE: 69 BPM

## 2020-03-16 DIAGNOSIS — H93.8X3 EAR FULLNESS, BILATERAL: ICD-10-CM

## 2020-03-16 DIAGNOSIS — R05.9 COUGH: Primary | ICD-10-CM

## 2020-03-16 RX ORDER — PREDNISONE 10 MG/1
TABLET ORAL
COMMUNITY
End: 2020-09-14 | Stop reason: ALTCHOICE

## 2020-03-16 RX ORDER — SULINDAC 200 MG/1
TABLET ORAL
COMMUNITY
End: 2020-09-14 | Stop reason: ALTCHOICE

## 2020-03-16 RX ORDER — VALACYCLOVIR HYDROCHLORIDE 1 G/1
1000 TABLET, FILM COATED ORAL DAILY
COMMUNITY
End: 2021-05-10 | Stop reason: ALTCHOICE

## 2020-03-16 RX ORDER — DOXYCYCLINE 100 MG/1
100 TABLET ORAL 2 TIMES DAILY
Qty: 20 TAB | Refills: 0 | Status: SHIPPED | OUTPATIENT
Start: 2020-03-16 | End: 2020-03-26

## 2020-03-16 RX ORDER — METHYLPREDNISOLONE 4 MG/1
TABLET ORAL
Qty: 1 DOSE PACK | Refills: 0 | Status: SHIPPED | OUTPATIENT
Start: 2020-03-16 | End: 2020-09-14 | Stop reason: ALTCHOICE

## 2020-03-16 NOTE — PROGRESS NOTES
HISTORY OF PRESENT ILLNESS  Nolberto Espinoza is a 60 y.o. female. HPI: Patient is complaining of productive cough, mostly at night, yellow mucus and bilateral ear fullness x 1 week. Cough is deep and waking her up at night. Using inhaler and cough medication without help.  Denies chills, fever   Past Medical History:   Diagnosis Date    Abscess 2012    Allergic rhinitis     Angioedema     Asthma     Back pain 2009    Dyslipidemia, goal LDL below 130 2017    GERD (gastroesophageal reflux disease)     History of asthma 2017    Hyperlipidemia LDL goal <130 2017    Hypertension     Vitamin D deficiency 2017     Past Surgical History:   Procedure Laterality Date    HX BREAST BIOPSY Right     BENIGN    HX DILATION AND CURETTAGE      HX GI      COLONOSCOPY    HX GYN  , 2002        HX ORTHOPAEDIC Bilateral     carpal tunnel    HX OTHER SURGICAL  2012    boil(buttock) excised by Dr Van Richmond  2/4/15    Excision of left buttock abscess by Dr. Romo Loop     Allergies   Allergen Reactions    Latex Swelling     Swelling in face when latex gloves were used at dentist office    Other Food Itching     Peas, raw broccoli, raw cauliflower, croutons    Hydrocodone-Acetaminophen Itching    Ultracet [Tramadol-Acetaminophen] Swelling     Facial and mouth    Biaxin [Clarithromycin] Itching and Nausea Only    Carrot Itching    Codeine Itching    Dilaudid [Hydromorphone] Itching and Nausea Only    Flexeril [Cyclobenzaprine] Itching    Naproxen Rash    Nucynta [Tapentadol] Itching    Oxycodone Swelling     Swelling of hands    Pcn [Penicillins] Rash    Peanut Shortness of Breath and Swelling     All nuts    Red Dye Itching and Swelling     #9    Shellfish Derived Swelling     shrimp    Strawberry Itching    Tramadol Hives       Current Outpatient Medications:     ALPHA-D-GALACTOSIDASE PO, Beano, Disp: , Rfl:     sulindac (CLINORIL) 200 mg tablet, sulindac 200 mg tablet  Take 1 tablet twice a day by oral route., Disp: , Rfl:     valACYclovir (VALTREX) 1 gram tablet, valacyclovir 1 gram tablet  Take 1 tablet every 12 hours by oral route for 3 days. , Disp: , Rfl:     doxycycline (ADOXA) 100 mg tablet, Take 1 Tab by mouth two (2) times a day for 10 days. , Disp: 20 Tab, Rfl: 0    methylPREDNISolone (MEDROL DOSEPACK) 4 mg tablet, Use as directed, Disp: 1 Dose Pack, Rfl: 0    doxycycline (ADOXA) 100 mg tablet, Take 1 Tab by mouth two (2) times a day for 10 days. , Disp: 20 Tab, Rfl: 0    guaiFENesin-dextromethorphan SR (MUCINEX DM) 600-30 mg per tablet, Take 1 Tab by mouth two (2) times a day., Disp: 20 Tab, Rfl: 1    omega 3-dha-epa-fish oil (FISH OIL) 100-160-1,000 mg cap, Take  by mouth., Disp: , Rfl:     levocetirizine (XYZAL) 5 mg tablet, Take  by mouth., Disp: , Rfl:     amLODIPine (NORVASC) 5 mg tablet, TAKE ONE TABLET BY MOUTH ONE TIME DAILY, Disp: 90 Tab, Rfl: 1    rosuvastatin (CRESTOR) 5 mg tablet, Take 1 Tab by mouth nightly., Disp: 30 Tab, Rfl: 3    methocarbamol (ROBAXIN) 500 mg tablet, TAKE 1 TABLET BY MOUTH TWICE DAILY AS NEEDED FOR PAIN, Disp: 15 Tab, Rfl: 0    cetirizine (ALLER-FERNANDA) 10 mg tablet, Take 1 Tab by mouth daily. Prn allergies, Disp: 90 Tab, Rfl: 3    raNITIdine (ZANTAC) 150 mg tablet, Take 1 Tab by mouth two (2) times a day., Disp: 180 Tab, Rfl: 3    CYANOCOBALAMIN, VITAMIN B-12, (LIQUID B 12 PO), Take  by mouth., Disp: , Rfl:     coenzyme q10 (CO Q-10) 10 mg cap, Take  by mouth., Disp: , Rfl:     fluticasone-vilanterol (BREO ELLIPTA) 100-25 mcg/dose inhaler, Take 1 Puff by inhalation daily. , Disp: 1 Inhaler, Rfl: 5    albuterol (PROVENTIL HFA, VENTOLIN HFA, PROAIR HFA) 90 mcg/actuation inhaler, Take 1-2 Puffs by inhalation every four (4) hours as needed for Wheezing or Shortness of Breath., Disp: 1 Inhaler, Rfl: 2    omeprazole (PRILOSEC OTC) 20 mg tablet, Take 20 mg by mouth daily. , Disp: , Rfl:     aspirin 81 mg tablet, Take 81 mg by mouth daily. , Disp: , Rfl:     ascorbic acid (VITAMIN C) 500 mg tablet, Take 500 mg by mouth daily. , Disp: , Rfl:     Cholecalciferol, Vitamin D3, (VITAMIN D3) 1,000 unit Cap, Take 1 capsule by mouth daily. , Disp: , Rfl:     multivitamin (ONE A DAY) tablet, Take 1 Tab by mouth daily. , Disp: , Rfl:     predniSONE (DELTASONE) 10 mg tablet, prednisone 10 mg tablet  Take 1 tablet every day by oral route., Disp: , Rfl:     ibuprofen (MOTRIN) 800 mg tablet, Take 1 Tab by mouth every eight (8) hours as needed for Pain., Disp: 30 Tab, Rfl: 0    EPIPEN 2-WAYNE 0.3 mg/0.3 mL (1:1,000) injection, 0.3 mL by IntraMUSCular route once as needed for up to 1 dose., Disp: 2 Each, Rfl: 3  Review of Systems   Constitutional: Negative. HENT: Positive for congestion. Ear fullness   Respiratory: Positive for cough, sputum production and wheezing. Cardiovascular: Negative. Gastrointestinal: Negative. Blood pressure 136/73, pulse 69, temperature 97.7 °F (36.5 °C), temperature source Oral, resp. rate 18, height 5' 3\" (1.6 m), weight 234 lb 12.8 oz (106.5 kg), SpO2 98 %. Body mass index is 41.59 kg/m². Physical Exam  Constitutional:       Appearance: She is obese. HENT:      Ears:      Comments: TM s dull, LR decreased RT>LT     Nose: Congestion present. No rhinorrhea. Mouth/Throat:      Mouth: Mucous membranes are moist.      Pharynx: Oropharynx is clear. Neck:      Musculoskeletal: Normal range of motion and neck supple. Cardiovascular:      Rate and Rhythm: Normal rate and regular rhythm. Pulses: Normal pulses. Heart sounds: Normal heart sounds. No murmur. Pulmonary:      Effort: Pulmonary effort is normal.      Breath sounds: Normal breath sounds. Abdominal:      General: Bowel sounds are normal.      Palpations: Abdomen is soft. Neurological:      Mental Status: She is alert.          ASSESSMENT and PLAN  Diagnoses and all orders for this visit: 1. Cough  -     methylPREDNISolone (MEDROL DOSEPACK) 4 mg tablet; Use as directed    2. Ear fullness, bilateral  -     doxycycline (ADOXA) 100 mg tablet; Take 1 Tab by mouth two (2) times a day for 10 days.   Call if not improved, will refer to ENT  Pt was given an after visit summary which includes diagnosis, current medicines and vital and voiced understanding of treatment plan

## 2020-03-16 NOTE — PROGRESS NOTES
Chief Complaint   Patient presents with    Cough     Patient c/o cough that started in Jan. and it not getting better.  Ear Fullness     Right ear. 1. Have you been to the ER, urgent care clinic since your last visit? Hospitalized since your last visit? Yes Where: [facility] 23/7/2020 Cough /ear fullness    2. Have you seen or consulted any other health care providers outside of the [address] [place] [address] since your last visit? Include any pap smears or colon screening.  No

## 2020-05-27 DIAGNOSIS — E78.5 DYSLIPIDEMIA, GOAL LDL BELOW 130: ICD-10-CM

## 2020-06-02 DIAGNOSIS — I10 ESSENTIAL HYPERTENSION, BENIGN: ICD-10-CM

## 2020-06-02 RX ORDER — AMLODIPINE BESYLATE 5 MG/1
5 TABLET ORAL DAILY
Qty: 90 TAB | Refills: 0 | Status: SHIPPED | OUTPATIENT
Start: 2020-06-02 | End: 2020-09-08

## 2020-06-02 RX ORDER — ROSUVASTATIN CALCIUM 5 MG/1
TABLET, COATED ORAL
Qty: 30 TAB | Refills: 2 | Status: SHIPPED | OUTPATIENT
Start: 2020-06-02 | End: 2020-09-08

## 2020-06-02 NOTE — TELEPHONE ENCOUNTER
Last visit 03/16/2020 NP [address]   Next appointment 08/10/2020 Russ   Previous refill encounter(s) 02/10/2020 Norvasc #90 with 1 refill     Requested Prescriptions     Pending Prescriptions Disp Refills    amLODIPine (NORVASC) 5 mg tablet 90 Tab 0     Sig: Take 1 Tab by mouth daily.

## 2020-06-02 NOTE — TELEPHONE ENCOUNTER
Pt req refill, pt does not have any BP meds remaining    .   Requested Prescriptions     Pending Prescriptions Disp Refills    amLODIPine (NORVASC) 5 mg tablet 90 Tab 1     Sig: TAKE ONE TABLET BY MOUTH ONE TIME DAILY     BCB#[phone]

## 2020-08-06 ENCOUNTER — TELEPHONE (OUTPATIENT)
Dept: FAMILY MEDICINE CLINIC | Age: 55
End: 2020-08-06

## 2020-08-06 NOTE — TELEPHONE ENCOUNTER
Chief Complaint   Patient presents with    Rescheduled Appointment     Patient left voice message informing that Annalee Beyer NP will not be working on Mondays. FRIDA Solano schedule is Wednesday morning in office and Friday afternoon in office visit. Request to contact office to reschedule.

## 2020-09-08 DIAGNOSIS — E78.5 DYSLIPIDEMIA, GOAL LDL BELOW 130: ICD-10-CM

## 2020-09-08 DIAGNOSIS — I10 ESSENTIAL HYPERTENSION, BENIGN: ICD-10-CM

## 2020-09-08 RX ORDER — AMLODIPINE BESYLATE 5 MG/1
TABLET ORAL
Qty: 90 TAB | Refills: 0 | Status: SHIPPED | OUTPATIENT
Start: 2020-09-08 | End: 2020-09-14 | Stop reason: SDUPTHER

## 2020-09-08 RX ORDER — ROSUVASTATIN CALCIUM 5 MG/1
TABLET, COATED ORAL
Qty: 30 TAB | Refills: 0 | Status: SHIPPED | OUTPATIENT
Start: 2020-09-08 | End: 2020-09-14 | Stop reason: SDUPTHER

## 2020-09-14 ENCOUNTER — OFFICE VISIT (OUTPATIENT)
Dept: FAMILY MEDICINE CLINIC | Age: 55
End: 2020-09-14
Payer: COMMERCIAL

## 2020-09-14 VITALS
OXYGEN SATURATION: 97 % | DIASTOLIC BLOOD PRESSURE: 78 MMHG | WEIGHT: 244 LBS | BODY MASS INDEX: 43.23 KG/M2 | HEART RATE: 71 BPM | RESPIRATION RATE: 20 BRPM | SYSTOLIC BLOOD PRESSURE: 152 MMHG | TEMPERATURE: 98.1 F | HEIGHT: 63 IN

## 2020-09-14 DIAGNOSIS — I10 BENIGN ESSENTIAL HYPERTENSION: Primary | ICD-10-CM

## 2020-09-14 DIAGNOSIS — Z91.018 MULTIPLE FOOD ALLERGIES: ICD-10-CM

## 2020-09-14 DIAGNOSIS — E78.00 HYPERCHOLESTEROLEMIA: ICD-10-CM

## 2020-09-14 DIAGNOSIS — R73.03 PREDIABETES: ICD-10-CM

## 2020-09-14 DIAGNOSIS — E66.01 OBESITY, CLASS III, BMI 40-49.9 (MORBID OBESITY) (HCC): ICD-10-CM

## 2020-09-14 DIAGNOSIS — Z91.018 HISTORY OF FOOD ANAPHYLAXIS: ICD-10-CM

## 2020-09-14 DIAGNOSIS — R60.9 FLUID RETENTION: ICD-10-CM

## 2020-09-14 PROBLEM — Z87.898 HISTORY OF ANGIOEDEMA: Status: ACTIVE | Noted: 2020-09-14

## 2020-09-14 PROCEDURE — 99214 OFFICE O/P EST MOD 30 MIN: CPT | Performed by: FAMILY MEDICINE

## 2020-09-14 RX ORDER — ROSUVASTATIN CALCIUM 5 MG/1
TABLET, COATED ORAL
Qty: 90 TAB | Refills: 1 | Status: SHIPPED | OUTPATIENT
Start: 2020-09-14 | End: 2021-04-06

## 2020-09-14 RX ORDER — AMLODIPINE BESYLATE 5 MG/1
TABLET ORAL
Qty: 90 TAB | Refills: 1 | Status: SHIPPED | OUTPATIENT
Start: 2020-09-14 | End: 2021-07-31

## 2020-09-14 RX ORDER — SPIRONOLACTONE 50 MG/1
50 TABLET, FILM COATED ORAL DAILY
Qty: 90 TAB | Refills: 0 | Status: SHIPPED | OUTPATIENT
Start: 2020-09-14 | End: 2020-12-23

## 2020-09-14 RX ORDER — EPINEPHRINE 0.3 MG/.3ML
0.3 INJECTION INTRAMUSCULAR
Qty: 2 SYRINGE | Refills: 1 | Status: SHIPPED | OUTPATIENT
Start: 2020-09-14 | End: 2020-09-14

## 2020-09-14 NOTE — PROGRESS NOTES
Chief Complaint   Patient presents with    Hypertension     f/u     Patient switching PCP's      1. Have you been to the ER, urgent care clinic since your last visit? Hospitalized since your last visit? No    2. Have you seen or consulted any other health care providers outside of the [address] [place] [address] since your last visit? Include any pap smears or colon screening.  Yes When: August 2020 Where: Dr. Haley Antonio Reason for visit: Back pain

## 2020-09-14 NOTE — PROGRESS NOTES
Chief Complaint   Patient presents with    Hypertension     follow up    Establish Care     change pcp    Medication Refill       HISTORY OF PRESENT ILLNESS   HPI  Follow up hypertension, hypercholesterolemia, medication check and rx refill. Prior patient of Jearl  requesting to change PCP. Her mother Saleem Mccormick is my patient of many years. Patient w/ h/o chronic benign essential hypertension dx ~ 8-9 yrs ago. Has been maintained on Norvasc 5 mg daily ever since then. She has had several suboptimal readings. Periodically checks her BP's at work at [employer] and still usually gets readings in the 140's/70's. Readings in office range between upper 130's-150's/70's. She does tend to retain fluid in her hands and wrists but not her lower extremities. Hands feel puffy and tight. Denies ever having been on a diuretic. Admits her eating habits are not good. Does eat a lot of salt. No regular exercise. Wt is up. Complies w/ medication regimen and otherwise tolerates her meds well. In general feels well. REVIEW OF SYMPTOMS   Review of Systems   Constitutional: Negative. Eyes: Negative. Respiratory: Negative. Cardiovascular: Negative for chest pain, palpitations and leg swelling. Gastrointestinal: Negative. Takes Prilosec OTC only for acid reflux w/ good control. Used to be on Zantac until discontinued. Due to possible long term effects of Prilosec, she will try transitioning to Pepcid OTC 20 mg and modify lifestyle   Genitourinary: Negative. Musculoskeletal: Negative for myalgias. Neurological: Negative.             PROBLEM LIST/MEDICAL HISTORY     Problem List  Date Reviewed: 9/14/2020          Codes Class Noted    History of angioedema ICD-10-CM: Z87.898  ICD-9-CM: V13.89  9/14/2020        Prediabetes ICD-10-CM: R73.03  ICD-9-CM: 790.29  9/14/2020        Vitamin D deficiency ICD-10-CM: E55.9  ICD-9-CM: 268.9  7/21/2017        Hypercholesterolemia ICD-10-CM: E78.00  ICD-9-CM: 272.0  2017        History of asthma (Chronic) ICD-10-CM: Z87.09  ICD-9-CM: V12.69  2017        Back pain at L4-L5 level ICD-10-CM: M54.5  ICD-9-CM: 724.2  2015        Obesity, Class III, BMI 40-49.9 (morbid obesity) ([facility] ICD-10-CM: E66.01  ICD-9-CM: 278.01  2012        Benign essential hypertension ICD-10-CM: I10  ICD-9-CM: 401.1  2010        Allergic rhinitis ICD-10-CM: J30.9  ICD-9-CM: 477.9  Unknown        Asthma (Chronic) ICD-10-CM: J45.909  ICD-9-CM: 493.90  Unknown        GERD (gastroesophageal reflux disease) (Chronic) ICD-10-CM: K21.9  ICD-9-CM: 530.81  Unknown                  PAST SURGICAL HISTORY     Past Surgical History:   Procedure Laterality Date    HX BREAST BIOPSY Right     BENIGN    HX CARPAL TUNNEL RELEASE Bilateral     HX  [address], 2002    HX COLONOSCOPY      HX CYST INCISION AND DRAINAGE  2012    boil(buttock) excised by Dr Sommer Soto  2/4/15    Excision of left buttock abscess by Dr. Joya Saldana     Current Outpatient Medications   Medication Sig    EpiPen 2-Abimael 0.3 mg/0.3 mL injection 0.3 mL by IntraMUSCular route once as needed for Anaphylaxis for up to 1 dose.  amLODIPine (NORVASC) 5 mg tablet TAKE 1 TABLET BY MOUTH ONE TIME DAILY    rosuvastatin (CRESTOR) 5 mg tablet TAKE 1 TABLET BY MOUTH ONE TIME DAILY    COQ10, UBIQUINOL, PO Take  by mouth daily.  Breo Ellipta 100-25 mcg/dose inhaler TAKE 1 PUFF BY INHALATION DAILY    omega 3-dha-epa-fish oil (FISH OIL) 100-160-1,000 mg cap Take  by mouth.  levocetirizine (XYZAL) 5 mg tablet Take  by mouth.  methocarbamol (ROBAXIN) 500 mg tablet TAKE 1 TABLET BY MOUTH TWICE DAILY AS NEEDED FOR PAIN    CYANOCOBALAMIN, VITAMIN B-12, (LIQUID B 12 PO) Take  by mouth.  omeprazole (PRILOSEC OTC) 20 mg tablet Take 20 mg by mouth daily.     aspirin 81 mg tablet Take 81 mg by mouth daily.    ascorbic acid (VITAMIN C) 500 mg tablet Take 500 mg by mouth daily.  Cholecalciferol, Vitamin D3, (VITAMIN D3) 1,000 unit Cap Take 1 capsule by mouth daily.  multivitamin (ONE A DAY) tablet Take 1 Tab by mouth daily.  valACYclovir (VALTREX) 1 gram tablet valacyclovir 1 gram tablet   Take 1 tablet every 12 hours by oral route for 3 days.  ibuprofen (MOTRIN) 800 mg tablet Take 1 Tab by mouth every eight (8) hours as needed for Pain.  albuterol (PROVENTIL HFA, VENTOLIN HFA, PROAIR HFA) 90 mcg/actuation inhaler Take 1-2 Puffs by inhalation every four (4) hours as needed for Wheezing or Shortness of Breath. No current facility-administered medications for this visit. ALLERGIES     Allergies   Allergen Reactions    Latex Swelling     Swelling in face when latex gloves were used at dentist office    Other Food Itching     Peas, raw broccoli, raw cauliflower, croutons    Hydrocodone-Acetaminophen Itching    Oxycodone Swelling     Swelling of hands    Peanut Shortness of Breath and Swelling     All nuts    Red Dye Itching and Swelling     #9    Shellfish Derived Swelling     shrimp    Ultracet [Tramadol-Acetaminophen] Swelling     Facial and mouth    Carrot Itching    Codeine Itching    Dilaudid [Hydromorphone] Itching and Nausea Only    Flexeril [Cyclobenzaprine] Itching    Naproxen Rash    Nucynta [Tapentadol] Itching    Pcn [Penicillins] Rash    Strawberry Itching    Tramadol Hives    Biaxin [Clarithromycin] Itching and Nausea Only          SOCIAL HISTORY     Social History     Socioeconomic History    Marital status:      Spouse name: Not on file    Number of children: Not on file    Years of education: Not on file    Highest education level: Not on file   Occupational History    Occupation: [employer]   Tobacco Use    Smoking status: Never Smoker    Smokeless tobacco: Never Used   Substance and Sexual Activity    Alcohol use:  Yes     Alcohol/week: 0.0 standard drinks     Types: 1 Standard drinks or equivalent per week     Comment: OCC.  Drug use: No    Sexual activity: Yes     Partners: Male     Comment: Menopause   Other Topics Concern    Caffeine Concern No     Comment: 1 serving of tea a day    Special Diet No    Exercise No        IMMUNIZATIONS  Immunization History   Administered Date(s) Administered    Influenza Vaccine 11/03/2014, 09/05/2018    TDAP Vaccine 01/18/2011    Varicella Virus Vaccine 10/01/2013         FAMILY HISTORY     Family History   Problem Relation Age of Onset   [facility] Stroke Mother     Hypertension Mother     Cancer Father         PROSTATE    Diabetes Sister     Cancer Maternal Aunt         BCA    Hypertension Brother     Cancer Other     Cancer Other     Cancer Other     Cancer Paternal Grandmother         pancreatic ca    Anesth Problems Neg Hx          VITALS     Visit Vitals  BP (!) 152/78 repeated end of exam left arm, 154/76 right arm; initial nurse bp check 152/78   Pulse 71   Temp 98.1 °F (36.7 °C) (Oral)   Resp 20   Ht 5' 3\" (1.6 m)   Wt 244 lb (110.7 kg)   SpO2 97%   BMI 43.22 kg/m²          PHYSICAL EXAMINATION   Physical Exam  Vitals signs reviewed. Constitutional:       General: She is not in acute distress. Appearance: She is obese. Eyes:      General: No scleral icterus. Cardiovascular:      Rate and Rhythm: Normal rate and regular rhythm. Heart sounds: Normal heart sounds. No murmur. No gallop. Pulmonary:      Effort: Pulmonary effort is normal.      Breath sounds: Normal breath sounds. Abdominal:      Palpations: Abdomen is soft. Tenderness: There is no abdominal tenderness. Musculoskeletal:      Comments: Mild edema B hands. Minimal ankle edema. Non tender. Skin:     General: Skin is warm. Neurological:      General: No focal deficit present.       Gait: Gait normal.             DIAGNOSTIC DATA         LABORATORY DATA         Lab Results   Component Value Date/Time    WBC 5.2 02/10/2020 10:39 AM    HGB 12.5 02/10/2020 10:39 AM    HCT 38.4 02/10/2020 10:39 AM    PLATELET 671 88/63/7038 10:39 AM    MCV 83.3 02/10/2020 10:39 AM     Lab Results   Component Value Date/Time    Hemoglobin A1c 5.8 (H) 02/10/2020 10:39 AM    Hemoglobin A1c 5.8 (H) 01/18/2017 03:32 PM    Glucose 81 02/10/2020 10:39 AM    LDL-CHOLESTEROL 77 02/10/2020 10:39 AM    LDL, calculated 156 (H) 11/05/2018 09:12 AM    Creatinine 0.76 02/10/2020 10:39 AM      Lab Results   Component Value Date/Time    Cholesterol, total 161 02/10/2020 10:39 AM    HDL Cholesterol 71 02/10/2020 10:39 AM    LDL-CHOLESTEROL 77 02/10/2020 10:39 AM    LDL, calculated 156 (H) 11/05/2018 09:12 AM    Triglyceride 54 02/10/2020 10:39 AM    CHOL/HDL Ratio 3.3 03/04/2010 09:38 AM    Cholesterol/HDL ratio 2.3 02/10/2020 10:39 AM     Lab Results   Component Value Date/Time    TSH 0.44 02/10/2020 10:39 AM    T4, Free 1.2 02/10/2020 10:39 AM      Lab Results   Component Value Date/Time    Sodium 141 02/10/2020 10:39 AM    Potassium 3.8 02/10/2020 10:39 AM    Chloride 104 02/10/2020 10:39 AM    CO2 34 (H) 02/10/2020 10:39 AM    Anion gap 2 (L) 01/30/2015 11:48 AM    Glucose 81 02/10/2020 10:39 AM    BUN 11 02/10/2020 10:39 AM    Creatinine 0.76 02/10/2020 10:39 AM    BUN/Creatinine ratio NOT APPLICABLE 07/73/9476 96:12 AM    GFR est  02/10/2020 10:39 AM    GFR est non-AA 88 02/10/2020 10:39 AM    Calcium 9.8 02/10/2020 10:39 AM    Bilirubin, total 0.5 02/10/2020 10:39 AM    ALT (SGPT) 20 02/10/2020 10:39 AM    Alk. phosphatase 112 02/10/2020 10:39 AM    Protein, total 7.2 02/10/2020 10:39 AM    Albumin 4.3 02/10/2020 10:39 AM    Globulin 2.9 02/10/2020 10:39 AM    A-G Ratio 1.6 11/05/2018 09:12 AM          ASSESSMENT & PLAN       ICD-10-CM ICD-9-CM    1. Benign essential hypertension  I10 401.1 amLODIPine (NORVASC) 5 mg tablet      spironolactone (ALDACTONE) 50 mg tablet   2. Fluid retention  R60.9 276.69 spironolactone (ALDACTONE) 50 mg tablet   3. Hypercholesterolemia  E78.00 272.0 rosuvastatin (CRESTOR) 5 mg tablet   4. Prediabetes  R73.03 790.29    5. Obesity, Class III, BMI 40-49.9 (morbid obesity) ([facility])  E66.01 278.01    6. Multiple food allergies  Z91.018 V15.05 EpiPen 2-Abimael 0.3 mg/0.3 mL injection   7. History of food anaphylaxis  Z91.018 V13.81 EpiPen 2-Abimael 0.3 mg/0.3 mL injection       Cardiovascular risk and specific BP/BS/hgba1c/lipid/LDL goals and previous labs from 2-2020 reviewed w/ pt today  Add Aldactone 50 mg once daily  Continue Norvasc 5 mg once daily and Crestor 5 mg once daily, both of which were renewed today  Reviewed medications and side effects   Reviewed diet, nutrition, exercise, weight management, BMI/goals. Continue interim BP monitoring and follow up in 6 weeks w/ log of her readings  Call back sooner in the interim for problems or concerns.

## 2020-09-14 NOTE — LETTER
September 14, 2020 K[place]an [place] [address] [place] [address] Dear Soha Sherwood: Thank you for requesting access to [facility]. Please follow the instructions below to securely access and download your online medical record. [facility] allows you to send messages to your doctor, view your test results, renew your prescriptions, schedule appointments, and more. How Do I Sign Up? 1. In your internet browser, go to [email or web address]. 2. Click on the First Time User? Click Here link in the Sign In box. You will see the New Member Sign Up page. 3. Enter your [facility] Access Code exactly as it appears below. You will not need to use this code after youve completed the sign-up process. If you do not sign up before the expiration date, you must request a new code. [facility] Access Code: [email or web address] Expires: 10/29/2020  7:58 AM  
 
4. Enter the last four digits of your Social Security Number (xxxx) and Date of Birth (mm/dd/yyyy) as indicated and click Submit. You will be taken to the next sign-up page. 5. Create a [facility] ID. This will be your [facility] login ID and cannot be changed, so think of one that is secure and easy to remember. 6. Create a [facility] password. You can change your password at any time. 7. Enter your Password Reset Question and Answer. This can be used at a later time if you forget your password. 8. Enter your e-mail address. You will receive e-mail notification when new information is available in [address] 9. Click Sign Up. You can now view and download portions of your medical record. 10. Click the Download Summary menu link to download a portable copy of your medical information. Additional Information If you have questions, please visit the Frequently Asked Questions section of the [facility] website at [email or web address] Remember, [facility] is NOT to be used for urgent needs. For medical emergencies, dial 911. Now available from your iPhone and Android! Sincerely, [employer]

## 2020-10-26 ENCOUNTER — OFFICE VISIT (OUTPATIENT)
Dept: FAMILY MEDICINE CLINIC | Age: 55
End: 2020-10-26
Payer: COMMERCIAL

## 2020-10-26 VITALS
OXYGEN SATURATION: 98 % | RESPIRATION RATE: 18 BRPM | SYSTOLIC BLOOD PRESSURE: 132 MMHG | BODY MASS INDEX: 42.59 KG/M2 | HEART RATE: 72 BPM | HEIGHT: 63 IN | TEMPERATURE: 98.1 F | WEIGHT: 240.4 LBS | DIASTOLIC BLOOD PRESSURE: 70 MMHG

## 2020-10-26 DIAGNOSIS — R73.03 PREDIABETES: ICD-10-CM

## 2020-10-26 DIAGNOSIS — I10 BENIGN ESSENTIAL HYPERTENSION: Primary | ICD-10-CM

## 2020-10-26 DIAGNOSIS — Z12.31 ENCOUNTER FOR SCREENING MAMMOGRAM FOR BREAST CANCER: ICD-10-CM

## 2020-10-26 DIAGNOSIS — E78.00 HYPERCHOLESTEROLEMIA: ICD-10-CM

## 2020-10-26 PROCEDURE — 99213 OFFICE O/P EST LOW 20 MIN: CPT | Performed by: FAMILY MEDICINE

## 2020-10-26 RX ORDER — EPINEPHRINE 0.3 MG/.3ML
INJECTION SUBCUTANEOUS
COMMUNITY
Start: 2020-09-14

## 2020-10-26 RX ORDER — ESTRADIOL AND NORETHINDRONE ACETATE .5; .1 MG/1; MG/1
TABLET ORAL
COMMUNITY

## 2020-10-26 RX ORDER — DICLOFENAC SODIUM 10 MG/G
GEL TOPICAL
COMMUNITY
End: 2020-10-26 | Stop reason: ALTCHOICE

## 2020-10-26 RX ORDER — FAMOTIDINE 20 MG/1
20 TABLET, FILM COATED ORAL
COMMUNITY

## 2020-10-26 RX ORDER — MELOXICAM 15 MG/1
15 TABLET ORAL DAILY
COMMUNITY
End: 2021-01-14 | Stop reason: ALTCHOICE

## 2020-10-26 RX ORDER — BUPROPION HYDROCHLORIDE 150 MG/1
TABLET ORAL
COMMUNITY

## 2020-10-26 NOTE — PROGRESS NOTES
Chief Complaint   Patient presents with    Hypertension     follow up       HISTORY OF PRESENT ILLNESS   HPI  6 week follow up hypertension and medication check. At her last visit 20 we added Aldactone 50 mg once daily. She is tolerating this well w/o reaction or side effects. No interim BP checks since starting on medication. Fluid retention improved a lot. Weight down 4 lbs. No special diet. No regular exercise. Feeling well. No complaints. REVIEW OF SYMPTOMS   Review of Systems   Constitutional: Negative. Respiratory: Negative. Cardiovascular: Negative. Gastrointestinal: Negative. Neurological: Negative.             PROBLEM LIST/MEDICAL HISTORY     Problem List  Date Reviewed: 10/26/2020          Codes Class Noted    History of angioedema ICD-10-CM: Z87.898  ICD-9-CM: V13.89  2020        Prediabetes ICD-10-CM: R73.03  ICD-9-CM: 790.29  2020        Vitamin D deficiency ICD-10-CM: E55.9  ICD-9-CM: 268.9  2017        Hypercholesterolemia ICD-10-CM: E78.00  ICD-9-CM: 272.0  2017        History of asthma (Chronic) ICD-10-CM: Z87.09  ICD-9-CM: V12.69  2017        Back pain at L4-L5 level ICD-10-CM: M54.5  ICD-9-CM: 724.2  2015        Obesity, Class III, BMI 40-49.9 (morbid obesity) ([facility] ICD-10-CM: E66.01  ICD-9-CM: 278.01  2012        Benign essential hypertension ICD-10-CM: I10  ICD-9-CM: 401.1  2010        Allergic rhinitis ICD-10-CM: J30.9  ICD-9-CM: 477.9  Unknown        Asthma (Chronic) ICD-10-CM: J45.909  ICD-9-CM: 493.90  Unknown        GERD (gastroesophageal reflux disease) (Chronic) ICD-10-CM: K21.9  ICD-9-CM: 530.81  Unknown                  PAST SURGICAL HISTORY     Past Surgical History:   Procedure Laterality Date    HX BREAST BIOPSY Right     BENIGN    HX CARPAL TUNNEL RELEASE Bilateral     HX  [address], 2002    HX COLONOSCOPY      HX CYST INCISION AND DRAINAGE  9-6-2012    boil(buttock) excised by Dr Jalyn Pinto  HX CYST INCISION AND DRAINAGE  2/4/15    Excision of left buttock abscess by Dr. Asuncion Peter     Current Outpatient Medications   Medication Sig    estradiol-norethindrone (ACTIVELLA) 0.5-0.1 mg per tablet estradiol-norethindrone acet 1 mg-0.5 mg tablet    buPROPion XL (Wellbutrin XL) 150 mg tablet Wellbutrin  mg 24 hr tablet, extended release   Take 1 tablet every day by oral route in the morning.  EPINEPHrine (EPIPEN) 0.3 mg/0.3 mL injection     meloxicam (Mobic) 15 mg tablet Take 15 mg by mouth daily.  famotidine (Pepcid) 20 mg tablet Take 20 mg by mouth two (2) times daily as needed for Heartburn.  amLODIPine (NORVASC) 5 mg tablet TAKE 1 TABLET BY MOUTH ONE TIME DAILY    rosuvastatin (CRESTOR) 5 mg tablet TAKE 1 TABLET BY MOUTH ONE TIME DAILY    COQ10, UBIQUINOL, PO Take  by mouth daily.  spironolactone (ALDACTONE) 50 mg tablet Take 1 Tab by mouth daily.  Breo Ellipta 100-25 mcg/dose inhaler TAKE 1 PUFF BY INHALATION DAILY    valACYclovir (VALTREX) 1 gram tablet Take 1,000 mg by mouth daily.  omega 3-dha-epa-fish oil (FISH OIL) 100-160-1,000 mg cap Take  by mouth.  levocetirizine (XYZAL) 5 mg tablet Take  by mouth.  methocarbamol (ROBAXIN) 500 mg tablet TAKE 1 TABLET BY MOUTH TWICE DAILY AS NEEDED FOR PAIN    albuterol (PROVENTIL HFA, VENTOLIN HFA, PROAIR HFA) 90 mcg/actuation inhaler Take 1-2 Puffs by inhalation every four (4) hours as needed for Wheezing or Shortness of Breath.  aspirin 81 mg tablet Take 81 mg by mouth daily.  ascorbic acid (VITAMIN C) 500 mg tablet Take 500 mg by mouth daily.  Cholecalciferol, Vitamin D3, (VITAMIN D3) 1,000 unit Cap Take 1 capsule by mouth daily.  multivitamin (ONE A DAY) tablet Take 1 Tab by mouth daily. No current facility-administered medications for this visit.            ALLERGIES     Allergies   Allergen Reactions    Latex Swelling     Swelling in face when latex gloves were used at dentist office    Other Food Itching     Peas, raw broccoli, raw cauliflower, croutons    Hydrocodone-Acetaminophen Itching    Oxycodone Swelling     Swelling of hands    Peanut Shortness of Breath and Swelling     All nuts    Red Dye Itching and Swelling     #9    Shellfish Derived Swelling     shrimp    Ultracet [Tramadol-Acetaminophen] Swelling     Facial and mouth    Carrot Itching    Codeine Itching    Dilaudid [Hydromorphone] Itching and Nausea Only    Flexeril [Cyclobenzaprine] Itching    Naproxen Rash    Nucynta [Tapentadol] Itching    Pcn [Penicillins] Rash    Strawberry Itching    Tramadol Hives    Biaxin [Clarithromycin] Itching and Nausea Only          SOCIAL HISTORY     Social History     Socioeconomic History    Marital status:      Spouse name: Not on file    Number of children: Not on file    Years of education: Not on file    Highest education level: Not on file   Occupational History    Occupation: [employer]   Tobacco Use    Smoking status: Never Smoker    Smokeless tobacco: Never Used   Substance and Sexual Activity    Alcohol use: Yes     Alcohol/week: 0.0 standard drinks     Types: 1 Standard drinks or equivalent per week     Comment: OCC.     Drug use: No    Sexual activity: Yes     Partners: Male     Comment: Menopause   Other Topics Concern    Caffeine Concern No     Comment: 1 serving of tea a day    Special Diet No    Exercise No        IMMUNIZATIONS  Immunization History   Administered Date(s) Administered    Influenza Vaccine 11/03/2014, 09/05/2018    Influenza Vaccine (Quad) Mdck Pf (>4 Yrs Flucelvax QUAD [id number]) 08/14/2020    TDAP Vaccine 01/18/2011    Varicella Virus Vaccine 10/01/2013    Zoster Recombinant 06/26/2020         FAMILY HISTORY     Family History   Problem Relation Age of Onset    Stroke Mother     Hypertension Mother     Cancer Father         PROSTATE    Diabetes Sister     Cancer Maternal Aunt BCA    Hypertension Brother     Cancer Other     Cancer Other     Cancer Other     Cancer Paternal Grandmother         pancreatic ca    Anesth Problems Neg Hx          VITALS     Visit Vitals  /70 (BP 1 Location: Left arm, BP Patient Position: Sitting)   Pulse 72   Temp 98.1 °F (36.7 °C) (Temporal)   Resp 18   Ht 5' 3\" (1.6 m)   Wt 240 lb 6.4 oz (109 kg)   SpO2 98%   BMI 42.58 kg/m²          PHYSICAL EXAMINATION   Physical Exam  Vitals signs reviewed. Constitutional:       General: She is not in acute distress. Cardiovascular:      Rate and Rhythm: Normal rate and regular rhythm. Heart sounds: Normal heart sounds. No murmur. No gallop. Pulmonary:      Effort: Pulmonary effort is normal. No respiratory distress. Breath sounds: Normal breath sounds.              DIAGNOSTIC DATA         LABORATORY DATA         Lab Results   Component Value Date/Time    WBC 5.2 02/10/2020 10:39 AM    HGB 12.5 02/10/2020 10:39 AM    HCT 38.4 02/10/2020 10:39 AM    PLATELET 303 22/06/7713 10:39 AM    MCV 83.3 02/10/2020 10:39 AM     Lab Results   Component Value Date/Time    Hemoglobin A1c 5.8 (H) 02/10/2020 10:39 AM    Hemoglobin A1c 5.8 (H) 01/18/2017 03:32 PM    Glucose 81 02/10/2020 10:39 AM    LDL-CHOLESTEROL 77 02/10/2020 10:39 AM    LDL, calculated 156 (H) 11/05/2018 09:12 AM    Creatinine 0.76 02/10/2020 10:39 AM      Lab Results   Component Value Date/Time    Cholesterol, total 161 02/10/2020 10:39 AM    HDL Cholesterol 71 02/10/2020 10:39 AM    LDL-CHOLESTEROL 77 02/10/2020 10:39 AM    LDL, calculated 156 (H) 11/05/2018 09:12 AM    Triglyceride 54 02/10/2020 10:39 AM    CHOL/HDL Ratio 3.3 03/04/2010 09:38 AM    Cholesterol/HDL ratio 2.3 02/10/2020 10:39 AM     Lab Results   Component Value Date/Time    TSH 0.44 02/10/2020 10:39 AM    T4, Free 1.2 02/10/2020 10:39 AM      Lab Results   Component Value Date/Time    Sodium 141 02/10/2020 10:39 AM    Potassium 3.8 02/10/2020 10:39 AM    Chloride 104 02/10/2020 10:39 AM    CO2 34 (H) 02/10/2020 10:39 AM    Anion gap 2 (L) 01/30/2015 11:48 AM    Glucose 81 02/10/2020 10:39 AM    BUN 11 02/10/2020 10:39 AM    Creatinine 0.76 02/10/2020 10:39 AM    BUN/Creatinine ratio NOT APPLICABLE 55/62/1776 73:75 AM    GFR est  02/10/2020 10:39 AM    GFR est non-AA 88 02/10/2020 10:39 AM    Calcium 9.8 02/10/2020 10:39 AM    Bilirubin, total 0.5 02/10/2020 10:39 AM    ALT (SGPT) 20 02/10/2020 10:39 AM    Alk. phosphatase 112 02/10/2020 10:39 AM    Protein, total 7.2 02/10/2020 10:39 AM    Albumin 4.3 02/10/2020 10:39 AM    Globulin 2.9 02/10/2020 10:39 AM    A-G Ratio 1.6 11/05/2018 09:12 AM          ASSESSMENT & PLAN       ICD-10-CM ICD-9-CM    1. Benign essential hypertension, improved on new medication regimen I10 401.1    2. Hypercholesterolemia at goal on statin therapy  E78.00 272.0    3. Prediabetes  R73.03 790.29    4. Encounter for screening mammogram for breast cancer  Z12.31 V76.12 [facility] BI SCREENING INCL CAD     Stable. Continue current regimen at this time  Cardiovascular risk and specific BP/BS/hgba1c/Lipid/LDL goals reviewed  Reviewed diet, nutrition, exercise, weight management, BMI/goals. Age/risk based screening recommendations, health maintenance & prevention counseling. Cancer screening USPTFS guidelines reviewed w/ pt today. Discussed benefits/positive/negative outcomes of screening based on age/risk stratification. Informed consent for/against screening based on pt's personal hx/risk factors. Updated in history above and health maintenance. Reviewed medications and side effects, doing well on current regimen. No changes at this time  Return for fasting CPE 2-2021.  Follow up sooner prn

## 2020-10-26 NOTE — PROGRESS NOTES
Chief Complaint   Patient presents with    Hypertension     follow up     1. Have you been to the ER, urgent care clinic since your last visit? Hospitalized since your last visit? No    2. Have you seen or consulted any other health care providers outside of the [address] [place] [address] since your last visit? Include any pap smears or colon screening. No    Patient stated she fell on 10/21 /2020 at work. Denies any injuries.

## 2020-12-23 DIAGNOSIS — R60.9 FLUID RETENTION: ICD-10-CM

## 2020-12-23 DIAGNOSIS — I10 BENIGN ESSENTIAL HYPERTENSION: ICD-10-CM

## 2020-12-23 RX ORDER — SPIRONOLACTONE 50 MG/1
TABLET, FILM COATED ORAL
Qty: 90 TAB | Refills: 0 | Status: SHIPPED | OUTPATIENT
Start: 2020-12-23 | End: 2021-01-14 | Stop reason: SDUPTHER

## 2020-12-28 ENCOUNTER — HOSPITAL ENCOUNTER (OUTPATIENT)
Dept: MAMMOGRAPHY | Age: 55
Discharge: HOME | End: 2020-12-28
Attending: FAMILY MEDICINE
Payer: COMMERCIAL

## 2020-12-28 DIAGNOSIS — Z12.31 ENCOUNTER FOR SCREENING MAMMOGRAM FOR BREAST CANCER: ICD-10-CM

## 2020-12-28 PROCEDURE — 77063 BREAST TOMOSYNTHESIS BI: CPT

## 2021-01-14 ENCOUNTER — OFFICE VISIT (OUTPATIENT)
Dept: FAMILY MEDICINE CLINIC | Age: 56
End: 2021-01-14
Payer: COMMERCIAL

## 2021-01-14 VITALS
WEIGHT: 241.8 LBS | BODY MASS INDEX: 42.84 KG/M2 | HEART RATE: 71 BPM | DIASTOLIC BLOOD PRESSURE: 74 MMHG | SYSTOLIC BLOOD PRESSURE: 122 MMHG | OXYGEN SATURATION: 98 % | TEMPERATURE: 97.8 F | RESPIRATION RATE: 18 BRPM | HEIGHT: 63 IN

## 2021-01-14 DIAGNOSIS — M51.36 DDD (DEGENERATIVE DISC DISEASE), LUMBAR: ICD-10-CM

## 2021-01-14 DIAGNOSIS — M53.80 BACK TIGHTNESS: ICD-10-CM

## 2021-01-14 DIAGNOSIS — M62.830 SPASM OF MUSCLE OF LOWER BACK: ICD-10-CM

## 2021-01-14 DIAGNOSIS — I10 BENIGN ESSENTIAL HYPERTENSION: ICD-10-CM

## 2021-01-14 DIAGNOSIS — R60.9 FLUID RETENTION: ICD-10-CM

## 2021-01-14 DIAGNOSIS — G89.29 ACUTE EXACERBATION OF CHRONIC LOW BACK PAIN: Primary | ICD-10-CM

## 2021-01-14 DIAGNOSIS — M54.59 MECHANICAL LOW BACK PAIN: ICD-10-CM

## 2021-01-14 DIAGNOSIS — M54.50 ACUTE EXACERBATION OF CHRONIC LOW BACK PAIN: Primary | ICD-10-CM

## 2021-01-14 PROCEDURE — 99214 OFFICE O/P EST MOD 30 MIN: CPT | Performed by: FAMILY MEDICINE

## 2021-01-14 RX ORDER — SPIRONOLACTONE 50 MG/1
TABLET, FILM COATED ORAL
Qty: 90 TAB | Refills: 0 | Status: SHIPPED | OUTPATIENT
Start: 2021-01-14 | End: 2021-07-31

## 2021-01-14 RX ORDER — METHOCARBAMOL 500 MG/1
500 TABLET, FILM COATED ORAL
Qty: 30 TAB | Refills: 1 | Status: SHIPPED | OUTPATIENT
Start: 2021-01-14 | End: 2021-07-16 | Stop reason: SDUPTHER

## 2021-01-14 RX ORDER — MINERAL OIL
180 ENEMA (ML) RECTAL DAILY
COMMUNITY
End: 2021-03-18 | Stop reason: SDUPTHER

## 2021-01-14 RX ORDER — DICLOFENAC SODIUM 75 MG/1
75 TABLET, DELAYED RELEASE ORAL DAILY
COMMUNITY
Start: 2021-01-05 | End: 2021-01-14

## 2021-01-14 RX ORDER — DICLOFENAC SODIUM 75 MG/1
75 TABLET, DELAYED RELEASE ORAL 2 TIMES DAILY WITH MEALS
Qty: 60 TAB | Refills: 1 | Status: SHIPPED | OUTPATIENT
Start: 2021-01-14 | End: 2021-07-16 | Stop reason: SDUPTHER

## 2021-01-14 NOTE — PROGRESS NOTES
Chief Complaint   Patient presents with    Back Pain     low back pain x 1 week     1. Have you been to the ER, urgent care clinic since your last visit? Hospitalized since your last visit? No    2. Have you seen or consulted any other health care providers outside of the [address] [place] [address] since your last visit? Include any pap smears or colon screening.  No

## 2021-01-14 NOTE — LETTER
NOTIFICATION FOR WORK 
 
1/14/2021 Meredith Mackey [address] [place] [address] To Whom It May Concern: 
 
Meredith Mackey is currently under the care of CHANTEL Waters. Please excuse from work 1/14-1/16 due to current medical condition. If there are questions or concerns please have the patient contact our office. Sincerely, Greg Delgado MD
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
## 2021-01-14 NOTE — PATIENT INSTRUCTIONS

## 2021-03-18 RX ORDER — MINERAL OIL
180 ENEMA (ML) RECTAL DAILY
Qty: 90 TAB | Refills: 0 | Status: SHIPPED | OUTPATIENT
Start: 2021-03-18

## 2021-03-18 NOTE — TELEPHONE ENCOUNTER
Last Visit: 1/14/21 MD Jet Hood  Next Appointment: Not scheduled  Previous Refill Encounter(s): Historical provider    Requested Prescriptions     Pending Prescriptions Disp Refills    fexofenadine (ALLEGRA) 180 mg tablet 90 Tab 0     Sig: Take 1 Tab by mouth daily.

## 2021-05-10 ENCOUNTER — OFFICE VISIT (OUTPATIENT)
Dept: FAMILY MEDICINE CLINIC | Age: 56
End: 2021-05-10
Payer: COMMERCIAL

## 2021-05-10 VITALS
TEMPERATURE: 98.1 F | HEART RATE: 65 BPM | BODY MASS INDEX: 42.74 KG/M2 | WEIGHT: 241.2 LBS | HEIGHT: 63 IN | DIASTOLIC BLOOD PRESSURE: 72 MMHG | SYSTOLIC BLOOD PRESSURE: 122 MMHG | OXYGEN SATURATION: 99 % | RESPIRATION RATE: 18 BRPM

## 2021-05-10 DIAGNOSIS — E78.00 HYPERCHOLESTEROLEMIA: Primary | ICD-10-CM

## 2021-05-10 DIAGNOSIS — R73.03 PREDIABETES: ICD-10-CM

## 2021-05-10 DIAGNOSIS — I10 BENIGN ESSENTIAL HYPERTENSION: ICD-10-CM

## 2021-05-10 PROBLEM — J30.89 PERENNIAL ALLERGIC RHINITIS: Status: ACTIVE | Noted: 2021-05-10

## 2021-05-10 PROCEDURE — 99213 OFFICE O/P EST LOW 20 MIN: CPT | Performed by: FAMILY MEDICINE

## 2021-05-10 RX ORDER — ROSUVASTATIN CALCIUM 5 MG/1
5 TABLET, COATED ORAL DAILY
Qty: 90 TAB | Refills: 3 | Status: SHIPPED | OUTPATIENT
Start: 2021-05-10 | End: 2022-04-22

## 2021-05-10 NOTE — PROGRESS NOTES
Chief Complaint   Patient presents with    Medication Refill     1. Have you been to the ER, urgent care clinic since your last visit? Hospitalized since your last visit? No    2. Have you seen or consulted any other health care providers outside of the [address] [place] [address] since your last visit? Include any pap smears or colon screening.  No
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
## 2021-05-12 LAB
ALB/GLOBRATIO, 58C: 1.5 (CALC) (ref 1–2.5)
ALBUMIN SERPL-MCNC: 4.2 G/DL (ref 3.6–5.1)
ALKALINE PHOSPHATASE, TOTAL, 25002000: 77 U/L (ref 37–153)
ALT SERPL-CCNC: 15 U/L (ref 6–29)
AST SERPL W P-5'-P-CCNC: 16 U/L (ref 10–35)
BILIRUB SERPL-MCNC: 0.4 MG/DL (ref 0.2–1.2)
BUN SERPL-MCNC: 19 MG/DL (ref 7–25)
BUN/CREATININE RATIO,BUCR: NORMAL (CALC) (ref 6–22)
CALCIUM SERPL-MCNC: 9.9 MG/DL (ref 8.6–10.4)
CHLORIDE SERPL-SCNC: 105 MMOL/L (ref 98–110)
CHOL/HDL RATIO,CHHDX: 2.5 (CALC)
CHOLEST SERPL-MCNC: 148 MG/DL
CO2 SERPL-SCNC: 30 MMOL/L (ref 20–32)
CREAT SERPL-MCNC: 0.79 MG/DL (ref 0.5–1.05)
EAG (MG/DL),9916804: 111 (CALC)
EAG (MMOL/L),9916805: 6.2 (CALC)
ERYTHROCYTE [DISTWIDTH] IN BLOOD BY AUTOMATED COUNT: 13.6 % (ref 11–15)
GLOBULIN,GLOB: 2.8 G/DL (CALC) (ref 1.9–3.7)
GLUCOSE SERPL-MCNC: 85 MG/DL (ref 65–99)
HBA1C MFR BLD HPLC: 5.5 % OF TOTAL HGB
HCT VFR BLD AUTO: 39.4 % (ref 35–45)
HDLC SERPL-MCNC: 59 MG/DL
HGB BLD-MCNC: 12.9 G/DL (ref 11.7–15.5)
LDL-CHOLESTEROL: 75 MG/DL (CALC)
MCH RBC QN AUTO: 28.5 PG (ref 27–33)
MCHC RBC AUTO-ENTMCNC: 32.7 G/DL (ref 32–36)
MCV RBC AUTO: 87.2 FL (ref 80–100)
NON-HDL CHOLESTEROL, 011976: 89 MG/DL (CALC)
PLATELET # BLD AUTO: 361 THOUSAND/UL (ref 140–400)
PMV BLD AUTO: 9.9 FL (ref 7.5–12.5)
POTASSIUM SERPL-SCNC: 4.6 MMOL/L (ref 3.5–5.3)
PROT SERPL-MCNC: 7 G/DL (ref 6.1–8.1)
RBC # BLD AUTO: 4.52 MILLION/UL (ref 3.8–5.1)
SODIUM SERPL-SCNC: 142 MMOL/L (ref 135–146)
TRIGL SERPL-MCNC: 62 MG/DL (ref ?–150)
TSH SERPL DL<=0.005 MIU/L-ACNC: 0.54 MIU/L (ref 0.4–4.5)
WBC # BLD AUTO: 7.1 THOUSAND/UL (ref 3.8–10.8)

## 2021-07-16 ENCOUNTER — TELEPHONE (OUTPATIENT)
Dept: FAMILY MEDICINE CLINIC | Age: 56
End: 2021-07-16

## 2021-07-16 ENCOUNTER — OFFICE VISIT (OUTPATIENT)
Dept: FAMILY MEDICINE CLINIC | Age: 56
End: 2021-07-16
Payer: COMMERCIAL

## 2021-07-16 VITALS
WEIGHT: 240.4 LBS | HEIGHT: 63 IN | SYSTOLIC BLOOD PRESSURE: 107 MMHG | TEMPERATURE: 98.4 F | HEART RATE: 73 BPM | OXYGEN SATURATION: 98 % | BODY MASS INDEX: 42.59 KG/M2 | DIASTOLIC BLOOD PRESSURE: 68 MMHG | RESPIRATION RATE: 18 BRPM

## 2021-07-16 DIAGNOSIS — M71.9: Primary | ICD-10-CM

## 2021-07-16 DIAGNOSIS — M62.830 SPASM OF MUSCLE OF LOWER BACK: ICD-10-CM

## 2021-07-16 DIAGNOSIS — M25.572 ACUTE LEFT ANKLE PAIN: ICD-10-CM

## 2021-07-16 PROCEDURE — 99213 OFFICE O/P EST LOW 20 MIN: CPT | Performed by: NURSE PRACTITIONER

## 2021-07-16 RX ORDER — LIDOCAINE 50 MG/G
PATCH TOPICAL
Qty: 30 EACH | Refills: 0 | Status: SHIPPED | OUTPATIENT
Start: 2021-07-16 | End: 2021-12-21

## 2021-07-16 RX ORDER — LIDOCAINE 50 MG/G
PATCH TOPICAL
Qty: 1 EACH | Refills: 0 | Status: SHIPPED | OUTPATIENT
Start: 2021-07-16 | End: 2021-07-16 | Stop reason: SDUPTHER

## 2021-07-16 RX ORDER — METHOCARBAMOL 500 MG/1
500 TABLET, FILM COATED ORAL
Qty: 30 TABLET | Refills: 1 | Status: SHIPPED | OUTPATIENT
Start: 2021-07-16 | End: 2021-12-21

## 2021-07-16 RX ORDER — DICLOFENAC SODIUM 75 MG/1
75 TABLET, DELAYED RELEASE ORAL 2 TIMES DAILY WITH MEALS
Qty: 60 TABLET | Refills: 1 | Status: SHIPPED | OUTPATIENT
Start: 2021-07-16 | End: 2021-12-21

## 2021-07-16 NOTE — TELEPHONE ENCOUNTER
Levi Miller call back to get the quantity of the lidocaine patch.     Requesting a call back    Best call [phone]

## 2021-07-16 NOTE — PROGRESS NOTES
[place] [facility] Note  Subjective:      Drea Corbin is a 60 y.o. female who presents for pain in anterior aspect of right upper leg, back spasm and right ankle pain for one week. She describes as sharp, better with rest, worse wit activity , rates pain 6/10. Past Medical History:   Diagnosis Date    Allergic rhinitis     Angioedema     Back pain 2009    Benign essential hypertension 2010    DDD (degenerative disc disease), lumbar 2021    Mild degenerative spondylosis, Xrays 2016    GERD (gastroesophageal reflux disease)     History of angioedema 2020    History of asthma 2017    Hypercholesterolemia     Perennial allergic rhinitis 5/10/2021    S/P Immunotherapy ~ 2000 x 5 yrs; Dr. Joan Mcneill Prediabetes 2020    Vitamin D deficiency 2017     Past Surgical History:   Procedure Laterality Date    HX BREAST BIOPSY Right 2013    BENIGN    HX CARPAL TUNNEL RELEASE Bilateral     HX  SECTION  , 2002    HX COLONOSCOPY      HX DILATION AND CURETTAGE         Current Outpatient Medications   Medication Sig Dispense Refill    OTHER Take  by mouth daily. Turmeric liquid. 1 tsp      methocarbamoL (ROBAXIN) 500 mg tablet Take 1 Tablet by mouth nightly as needed for Muscle Spasm(s) (and Tightness in Back). 30 Tablet 1    lidocaine (LIDODERM) 5 % Apply patch to the affected area for 12 hours a day and remove for 12 hours a day. 1 Each 0    diclofenac EC (VOLTAREN) 75 mg EC tablet Take 1 Tablet by mouth two (2) times daily (with meals). Take as needed for arthritis pain 60 Tablet 1    rosuvastatin (CRESTOR) 5 mg tablet Take 1 Tab by mouth daily. 90 Tab 3    TURMERIC PO Take  by mouth daily.  fexofenadine (ALLEGRA) 180 mg tablet Take 1 Tab by mouth daily.  90 Tab 0    spironolactone (ALDACTONE) 50 mg tablet TAKE 1 TABLET BY MOUTH ONE TIME DAILY 90 Tab 0    buPROPion XL (Wellbutrin XL) 150 mg tablet Wellbutrin  mg 24 hr tablet, extended release   Take 1 tablet every day by oral route in the morning.  EPINEPHrine (EPIPEN) 0.3 mg/0.3 mL injection       famotidine (Pepcid) 20 mg tablet Take 20 mg by mouth two (2) times daily as needed for Heartburn.  amLODIPine (NORVASC) 5 mg tablet TAKE 1 TABLET BY MOUTH ONE TIME DAILY 90 Tab 1    Breo Ellipta 100-25 mcg/dose inhaler TAKE 1 PUFF BY INHALATION DAILY 1 Inhaler 5    omega 3-dha-epa-fish oil (FISH OIL) 100-160-1,000 mg cap Take  by mouth.  levocetirizine (XYZAL) 5 mg tablet Take  by mouth.  albuterol (PROVENTIL HFA, VENTOLIN HFA, PROAIR HFA) 90 mcg/actuation inhaler Take 1-2 Puffs by inhalation every four (4) hours as needed for Wheezing or Shortness of Breath. 1 Inhaler 2    aspirin 81 mg tablet Take 81 mg by mouth daily.  ascorbic acid (VITAMIN C) 500 mg tablet Take 500 mg by mouth daily.  Cholecalciferol, Vitamin D3, (VITAMIN D3) 1,000 unit Cap Take 1 capsule by mouth daily.  multivitamin (ONE A DAY) tablet Take 1 Tab by mouth daily.       estradiol-norethindrone (ACTIVELLA) 0.5-0.1 mg per tablet estradiol-norethindrone acet 1 mg-0.5 mg tablet (Patient not taking: Reported on 7/16/2021)       Allergies   Allergen Reactions    Latex Swelling     Swelling in face when latex gloves were used at dentist office    Other Food Itching     Peas, raw broccoli, raw cauliflower, croutons    Hydrocodone-Acetaminophen Itching    Oxycodone Swelling     Swelling of hands    Peanut Shortness of Breath and Swelling     All nuts    Red Dye Itching and Swelling     #9    Shellfish Derived Swelling     shrimp    Ultracet [Tramadol-Acetaminophen] Swelling     Facial and mouth    Carrot Itching    Codeine Itching    Dilaudid [Hydromorphone] Itching and Nausea Only    Flexeril [Cyclobenzaprine] Itching    Naproxen Rash    Nucynta [Tapentadol] Itching    Pcn [Penicillins] Rash    Strawberry Itching    Tramadol Hives    Biaxin [Clarithromycin] Itching and Nausea Only       ROS:   Complete review of systems was reviewed with pertinent information listed in HPI. Review of Systems   Constitutional: Negative. Respiratory: Negative. Cardiovascular: Negative. Gastrointestinal: Negative. Musculoskeletal: Positive for back pain and joint pain. Objective:     Visit Vitals  /68 (BP 1 Location: Right arm, BP Patient Position: Sitting, BP Cuff Size: Large adult)   Pulse 73   Temp 98.4 °F (36.9 °C) (Temporal)   Resp 18   Ht 5' 3\" (1.6 m)   Wt 240 lb 6.4 oz (109 kg)   SpO2 98%   BMI 42.58 kg/m²       Vitals and Nurse Documentation reviewed. Physical Exam  Constitutional:       Appearance: Normal appearance. She is obese. HENT:      Mouth/Throat:      Mouth: Mucous membranes are moist.   Cardiovascular:      Rate and Rhythm: Normal rate and regular rhythm. Pulses: Normal pulses. Heart sounds: Normal heart sounds. No murmur heard. Pulmonary:      Effort: Pulmonary effort is normal.      Breath sounds: Normal breath sounds. Abdominal:      General: Bowel sounds are normal.      Palpations: Abdomen is soft. Musculoskeletal:         General: Tenderness present. Cervical back: Normal range of motion and neck supple. Comments: Lower back muscle stiffness, limited and painful ROM  Tenderness in anterior aspect of right thigh, walking with limp       Neurological:      Mental Status: She is alert. Psychiatric:         Mood and Affect: Mood normal.         Thought Content: Thought content normal.         Assessment/Plan:   Diagnoses and all orders for this visit:    1. Bursitis of upper extremity  -     methocarbamoL (ROBAXIN) 500 mg tablet; Take 1 Tablet by mouth nightly as needed for Muscle Spasm(s) (and Tightness in Back). -     lidocaine (LIDODERM) 5 %; Apply patch to the affected area for 12 hours a day and remove for 12 hours a day. -     diclofenac EC (VOLTAREN) 75 mg EC tablet;  Take 1 Tablet by mouth two (2) times daily (with meals). Take as needed for arthritis pain    2. Spasm of muscle of lower back      3.  Acute left ankle pain  Patient given off until,next Wednesday, 7/21            Pt expressed understanding with the diagnosis and plan        Discussed expected course/resolution/complications of diagnosis in detail with patient.    Medication risks/benefits/costs/interactions/alternatives discussed with patient.    Pt was given an after visit summary which includes diagnoses, current medications & vitals.  Pt expressed understanding with the diagnosis and plan

## 2021-07-16 NOTE — TELEPHONE ENCOUNTER
[id number] - [place], [address] [facility] [facility] (Pharmacy) [phone]     Juanita Crespo from [place] [address] is calling to verify quantity of medication. The order placed for lidocaine lists quantity as 1. Please call back and advise.

## 2021-07-16 NOTE — LETTER
NOTIFICATION RETURN TO WORK / SCHOOL    7/16/2021 10:37 AM    Ms. [address]  [address] [facility] [address]      To Whom It May Concern:    [address] is currently under the care of CHANTEL Waters. She will return to work on 7/21/2021    If there are questions or concerns please have the patient contact our office.         Sincerely,      Juana Hayes NP

## 2021-07-16 NOTE — PROGRESS NOTES
Chief Complaint   Patient presents with    Other     right ankle and hip pain for 2 weeks       1. Have you been to the ER, urgent care clinic since your last visit? Hospitalized since your last visit? No    2. Have you seen or consulted any other health care providers outside of the [address] [place] [address] since your last visit? Include any pap smears or colon screening.  No    3 most recent PHQ Screens 7/16/2021   Little interest or pleasure in doing things Not at all   Feeling down, depressed, irritable, or hopeless Not at all   Total Score PHQ 2 0

## 2021-07-30 DIAGNOSIS — I10 BENIGN ESSENTIAL HYPERTENSION: ICD-10-CM

## 2021-07-30 DIAGNOSIS — R60.9 FLUID RETENTION: ICD-10-CM

## 2021-07-31 RX ORDER — AMLODIPINE BESYLATE 5 MG/1
TABLET ORAL
Qty: 90 TABLET | Refills: 3 | Status: SHIPPED | OUTPATIENT
Start: 2021-07-31 | End: 2022-08-05

## 2021-07-31 RX ORDER — SPIRONOLACTONE 50 MG/1
TABLET, FILM COATED ORAL
Qty: 90 TABLET | Refills: 3 | Status: SHIPPED | OUTPATIENT
Start: 2021-07-31

## 2021-12-07 ENCOUNTER — TRANSCRIBE ORDER (OUTPATIENT)
Dept: SCHEDULING | Age: 56
End: 2021-12-07

## 2021-12-07 DIAGNOSIS — Z12.31 VISIT FOR SCREENING MAMMOGRAM: Primary | ICD-10-CM

## 2021-12-20 DIAGNOSIS — K21.00 GASTROESOPHAGEAL REFLUX DISEASE WITH ESOPHAGITIS: Chronic | ICD-10-CM

## 2021-12-20 DIAGNOSIS — M62.830 SPASM OF MUSCLE OF LOWER BACK: ICD-10-CM

## 2021-12-21 RX ORDER — DICLOFENAC SODIUM 75 MG/1
TABLET, DELAYED RELEASE ORAL
Qty: 60 TABLET | Refills: 0 | Status: SHIPPED | OUTPATIENT
Start: 2021-12-21 | End: 2022-05-25

## 2021-12-21 RX ORDER — RANITIDINE 150 MG/1
TABLET, FILM COATED ORAL
Qty: 190 TABLET | Refills: 0 | Status: SHIPPED | OUTPATIENT
Start: 2021-12-21

## 2021-12-21 RX ORDER — METHOCARBAMOL 500 MG/1
TABLET, FILM COATED ORAL
Qty: 30 TABLET | Refills: 0 | Status: SHIPPED
Start: 2021-12-21 | End: 2022-07-13

## 2021-12-21 RX ORDER — LIDOCAINE 50 MG/G
PATCH TOPICAL
Qty: 30 PATCH | Refills: 0 | Status: SHIPPED | OUTPATIENT
Start: 2021-12-21

## 2022-01-31 ENCOUNTER — HOSPITAL ENCOUNTER (OUTPATIENT)
Dept: MAMMOGRAPHY | Age: 57
Discharge: HOME | End: 2022-01-31
Attending: FAMILY MEDICINE
Payer: COMMERCIAL

## 2022-01-31 DIAGNOSIS — Z12.31 VISIT FOR SCREENING MAMMOGRAM: ICD-10-CM

## 2022-01-31 PROCEDURE — 77063 BREAST TOMOSYNTHESIS BI: CPT

## 2022-03-18 PROBLEM — Z87.898 HISTORY OF ANGIOEDEMA: Status: ACTIVE | Noted: 2020-09-14

## 2022-03-19 PROBLEM — E78.00 HYPERCHOLESTEROLEMIA: Status: ACTIVE | Noted: 2017-07-18

## 2022-03-19 PROBLEM — R73.03 PREDIABETES: Status: ACTIVE | Noted: 2020-09-14

## 2022-03-19 PROBLEM — E55.9 VITAMIN D DEFICIENCY: Status: ACTIVE | Noted: 2017-07-21

## 2022-03-19 PROBLEM — J30.89 PERENNIAL ALLERGIC RHINITIS: Status: ACTIVE | Noted: 2021-05-10

## 2022-03-20 PROBLEM — M54.50 CHRONIC LOW BACK PAIN: Status: ACTIVE | Noted: 2021-01-14

## 2022-03-20 PROBLEM — M51.369 DDD (DEGENERATIVE DISC DISEASE), LUMBAR: Status: ACTIVE | Noted: 2021-01-14

## 2022-03-20 PROBLEM — M51.36 DDD (DEGENERATIVE DISC DISEASE), LUMBAR: Status: ACTIVE | Noted: 2021-01-14

## 2022-03-20 PROBLEM — G89.29 CHRONIC LOW BACK PAIN: Status: ACTIVE | Noted: 2021-01-14

## 2022-05-23 DIAGNOSIS — M62.830 SPASM OF MUSCLE OF LOWER BACK: ICD-10-CM

## 2022-05-23 RX ORDER — DICLOFENAC SODIUM 75 MG/1
TABLET, DELAYED RELEASE ORAL
Qty: 60 TABLET | Refills: 0 | OUTPATIENT
Start: 2022-05-23

## 2022-05-23 NOTE — TELEPHONE ENCOUNTER
Last Visit: 7/16/21 MD VEGA  Next Appointment: None- patient appt due- was left message per last refill of pravastatin 4/22/22  Previous Refill Encounter(s): 12/21/21 60    Requested Prescriptions     Pending Prescriptions Disp Refills    diclofenac EC (VOLTAREN) 75 mg EC tablet 60 Tablet 0     Sig: Take 1 tablet by mouth two times a day with meals. Take as needed for arthritis pain.

## 2022-05-25 DIAGNOSIS — E78.00 HYPERCHOLESTEROLEMIA: ICD-10-CM

## 2022-05-25 DIAGNOSIS — M62.830 SPASM OF MUSCLE OF LOWER BACK: ICD-10-CM

## 2022-05-25 RX ORDER — ROSUVASTATIN CALCIUM 5 MG/1
TABLET, COATED ORAL
Qty: 90 TABLET | Refills: 0 | OUTPATIENT
Start: 2022-05-25

## 2022-05-25 RX ORDER — DICLOFENAC SODIUM 75 MG/1
TABLET, DELAYED RELEASE ORAL
Qty: 60 TABLET | Refills: 0 | Status: SHIPPED
Start: 2022-05-25 | End: 2022-07-13

## 2022-06-23 DIAGNOSIS — M62.830 SPASM OF MUSCLE OF LOWER BACK: ICD-10-CM

## 2022-06-23 RX ORDER — DICLOFENAC SODIUM 75 MG/1
TABLET, DELAYED RELEASE ORAL
Qty: 60 TABLET | Refills: 0 | OUTPATIENT
Start: 2022-06-23

## 2022-07-13 ENCOUNTER — OFFICE VISIT (OUTPATIENT)
Dept: FAMILY MEDICINE CLINIC | Age: 57
End: 2022-07-13
Payer: COMMERCIAL

## 2022-07-13 VITALS
BODY MASS INDEX: 41.39 KG/M2 | RESPIRATION RATE: 16 BRPM | OXYGEN SATURATION: 96 % | TEMPERATURE: 97.7 F | DIASTOLIC BLOOD PRESSURE: 59 MMHG | SYSTOLIC BLOOD PRESSURE: 103 MMHG | HEIGHT: 63 IN | WEIGHT: 233.6 LBS | HEART RATE: 69 BPM

## 2022-07-13 DIAGNOSIS — M25.561 ACUTE PAIN OF RIGHT KNEE: Primary | ICD-10-CM

## 2022-07-13 PROCEDURE — 99213 OFFICE O/P EST LOW 20 MIN: CPT | Performed by: NURSE PRACTITIONER

## 2022-07-13 RX ORDER — METHYLPREDNISOLONE 4 MG/1
TABLET ORAL
Qty: 1 DOSE PACK | Refills: 0 | Status: SHIPPED
Start: 2022-07-13 | End: 2022-08-26

## 2022-07-13 NOTE — LETTER
7/13/2022    Ms.  Kaylee Esparza  [address]  [address]     [address] is under my care    She will to work on 7/19/2022      Please call me if you have any questions: [phone]    Sincerely,      Aye Pierce, NP

## 2022-07-13 NOTE — PROGRESS NOTES
[facility] Note  Subjective:      Roslyn Cook is a 59 y.o. female who presents for right knee pain radiating to right lower leg. She describes pain as dull, constant, worse with kneeling and better with rest, rate pain 6/10. Requesting referral to Dr Tyra Garcia for cortisone injection on her knee. Past Medical History:   Diagnosis Date    Allergic rhinitis     Angioedema     Back pain 2009    Benign essential hypertension 2010    DDD (degenerative disc disease), lumbar 2021    Mild degenerative spondylosis, Xrays 2016    GERD (gastroesophageal reflux disease)     History of angioedema 2020    History of asthma 2017    Hypercholesterolemia     Perennial allergic rhinitis 5/10/2021    S/P Immunotherapy ~ 2000 x 5 yrs; Dr. Emily Lyman Prediabetes 2020    Vitamin D deficiency 2017        Past Surgical History:   Procedure Laterality Date    HX BREAST BIOPSY Right 2013    BENIGN    HX CARPAL TUNNEL RELEASE Bilateral     HX  SECTION  , 2002    HX COLONOSCOPY      HX DILATION AND CURETTAGE         Current Outpatient Medications   Medication Sig Dispense Refill    methylPREDNISolone (MEDROL DOSEPACK) 4 mg tablet Use as directed 1 Dose Pack 0    rosuvastatin (CRESTOR) 5 mg tablet TAKE 1 TABLET BY MOUTH ONE TIME DAILY 90 Tablet 0    raNITIdine (Acid Reducer) 150 mg tablet TAKE 1 TABLET BY MOUTH TWICE DAILY 190 Tablet 0    spironolactone (ALDACTONE) 50 mg tablet TAKE 1 TABLET BY MOUTH ONE TIME DAILY 90 Tablet 3    amLODIPine (NORVASC) 5 mg tablet TAKE 1 TABLET BY MOUTH ONE TIME DAILY 90 Tablet 3    OTHER Take  by mouth daily. Turmeric liquid. 1 tsp      TURMERIC PO Take  by mouth daily.  fexofenadine (ALLEGRA) 180 mg tablet Take 1 Tab by mouth daily. 90 Tab 0    famotidine (Pepcid) 20 mg tablet Take 20 mg by mouth two (2) times daily as needed for Heartburn.       omega 3-dha-epa-fish oil (FISH OIL) 100-160-1,000 mg cap Take  by mouth.  levocetirizine (XYZAL) 5 mg tablet Take  by mouth.  aspirin 81 mg tablet Take 81 mg by mouth daily.  Cholecalciferol, Vitamin D3, (VITAMIN D3) 1,000 unit Cap Take 1 capsule by mouth daily.  multivitamin (ONE A DAY) tablet Take 1 Tab by mouth daily.  lidocaine (LIDODERM) 5 % Apply patch to the affected area for 12 hours a day and remove for 12 hours a day. (Patient not taking: Reported on 7/13/2022) 30 Patch 0    estradiol-norethindrone (ACTIVELLA) 0.5-0.1 mg per tablet estradiol-norethindrone acet 1 mg-0.5 mg tablet (Patient not taking: Reported on 7/16/2021)      buPROPion XL (Wellbutrin XL) 150 mg tablet Wellbutrin  mg 24 hr tablet, extended release   Take 1 tablet every day by oral route in the morning. (Patient not taking: Reported on 7/13/2022)      EPINEPHrine (EPIPEN) 0.3 mg/0.3 mL injection  (Patient not taking: Reported on 7/13/2022)      Breo Ellipta 100-25 mcg/dose inhaler TAKE 1 PUFF BY INHALATION DAILY (Patient not taking: Reported on 7/13/2022) 1 Inhaler 5    albuterol (PROVENTIL HFA, VENTOLIN HFA, PROAIR HFA) 90 mcg/actuation inhaler Take 1-2 Puffs by inhalation every four (4) hours as needed for Wheezing or Shortness of Breath. (Patient not taking: Reported on 7/13/2022) 1 Inhaler 2    ascorbic acid (VITAMIN C) 500 mg tablet Take 500 mg by mouth daily.  (Patient not taking: Reported on 7/13/2022)       Allergies   Allergen Reactions    Latex Swelling     Swelling in face when latex gloves were used at dentist office    Other Food Itching     Peas, raw broccoli, raw cauliflower, croutons    Hydrocodone-Acetaminophen Itching    Oxycodone Swelling     Swelling of hands    Peanut Shortness of Breath and Swelling     All nuts    Red Dye Itching and Swelling     #9    Shellfish Derived Swelling     shrimp    Ultracet [Tramadol-Acetaminophen] Swelling     Facial and mouth    Carrot Itching    Codeine Itching    Dilaudid [Hydromorphone] Itching and Nausea Only    Flexeril [Cyclobenzaprine] Itching    Naproxen Rash    Nucynta [Tapentadol] Itching    Pcn [Penicillins] Rash    Strawberry Itching    Tramadol Hives    Biaxin [Clarithromycin] Itching and Nausea Only       ROS:   Complete review of systems was reviewed with pertinent information listed in HPI. Review of Systems   Constitutional: Negative. HENT: Negative. Respiratory: Negative. Cardiovascular: Negative. Genitourinary: Negative. Musculoskeletal: Positive for joint pain. Objective:     Visit Vitals  BP (!) 103/59 (BP 1 Location: Left arm, BP Patient Position: Sitting, BP Cuff Size: Adult long)   Pulse 69   Temp 97.7 °F (36.5 °C) (Temporal)   Resp 16   Ht 5' 3\" (1.6 m)   Wt 233 lb 9.6 oz (106 kg)   SpO2 96%   BMI 41.38 kg/m²       Vitals and Nurse Documentation reviewed. Physical Exam  Constitutional:       Appearance: Normal appearance. She is obese. HENT:      Mouth/Throat:      Mouth: Mucous membranes are moist.   Cardiovascular:      Rate and Rhythm: Normal rate and regular rhythm. Pulses: Normal pulses. Heart sounds: Normal heart sounds. No murmur heard. Pulmonary:      Effort: Pulmonary effort is normal.      Breath sounds: Normal breath sounds. Abdominal:      General: Bowel sounds are normal.      Palpations: Abdomen is soft. Musculoskeletal:         General: Tenderness present. Cervical back: Normal range of motion and neck supple. Comments: Right knee tenderness, increased with ROM, no swelling no effusion or heat   Neurological:      Mental Status: She is alert. Assessment/Plan:     Diagnoses and all orders for this visit:    1. Acute pain of right knee  -     methylPREDNISolone (MEDROL DOSEPACK) 4 mg tablet; Use as directed  -     REFERRAL TO ORTHOPEDIC SURGERY    2.  BMI 40.0-44.9, adult ([facility] [address]          Pt expressed understanding with the diagnosis and plan        Discussed expected course/resolution/complications of diagnosis in detail with patient.    Medication risks/benefits/costs/interactions/alternatives discussed with patient.    Pt was given an after visit summary which includes diagnoses, current medications & vitals.  Pt expressed understanding with the diagnosis and plan

## 2022-07-13 NOTE — PROGRESS NOTES
Chief Complaint   Patient presents with    Leg Pain     right    Knee Pain     right knee pain about week     1. Have you been to the ER, urgent care clinic since your last visit? Hospitalized since your last visit? No    2. Have you seen or consulted any other health care providers outside of the [address] [place] [address] since your last visit? Include any pap smears or colon screening.  No

## 2022-07-13 NOTE — LETTER
7/13/2022 3:25 PM    Ms.  One Jose E Brooks [address] [address]              Sincerely,      Valerie Gautam, NP

## 2022-07-13 NOTE — LETTER
NOTIFICATION RETURN TO WORK / SCHOOL    7/13/2022 3:25 PM    Ms. Kaylee Esparza  [address] [facility] [address]      To Whom It May Concern:    [address] is currently under the care of CHNATEL Waters. She will return to work/school on: 7/19/2022    If there are questions or concerns please have the patient contact our office.         Sincerely,      Carol Kelley NP

## 2022-08-05 DIAGNOSIS — I10 BENIGN ESSENTIAL HYPERTENSION: ICD-10-CM

## 2022-08-05 DIAGNOSIS — E78.00 HYPERCHOLESTEROLEMIA: ICD-10-CM

## 2022-08-05 RX ORDER — ROSUVASTATIN CALCIUM 5 MG/1
TABLET, COATED ORAL
Qty: 14 TABLET | Refills: 0 | Status: SHIPPED | OUTPATIENT
Start: 2022-08-05 | End: 2022-08-26 | Stop reason: SDUPTHER

## 2022-08-05 RX ORDER — AMLODIPINE BESYLATE 5 MG/1
TABLET ORAL
Qty: 14 TABLET | Refills: 0 | Status: SHIPPED | OUTPATIENT
Start: 2022-08-05 | End: 2022-08-26 | Stop reason: SDUPTHER

## 2022-08-05 NOTE — TELEPHONE ENCOUNTER
I sent in a 2 week supply only for now. She is past due fasting follow up and has nothing scheduled. No additional renewals until office visit.

## 2022-08-11 NOTE — TELEPHONE ENCOUNTER
LVM and sent pt a [employer] message letting her know that 2 week supply of medication was sent in but we do need to get her scheduled for an appt.

## 2022-08-17 DIAGNOSIS — E78.00 HYPERCHOLESTEROLEMIA: ICD-10-CM

## 2022-08-18 RX ORDER — ROSUVASTATIN CALCIUM 5 MG/1
TABLET, COATED ORAL
Qty: 14 TABLET | Refills: 0 | OUTPATIENT
Start: 2022-08-18

## 2022-08-26 ENCOUNTER — OFFICE VISIT (OUTPATIENT)
Dept: FAMILY MEDICINE CLINIC | Age: 57
End: 2022-08-26
Payer: COMMERCIAL

## 2022-08-26 VITALS
HEIGHT: 63 IN | BODY MASS INDEX: 40.61 KG/M2 | TEMPERATURE: 97.5 F | DIASTOLIC BLOOD PRESSURE: 69 MMHG | HEART RATE: 67 BPM | WEIGHT: 229.2 LBS | RESPIRATION RATE: 16 BRPM | OXYGEN SATURATION: 98 % | SYSTOLIC BLOOD PRESSURE: 117 MMHG

## 2022-08-26 DIAGNOSIS — E78.00 HYPERCHOLESTEROLEMIA: Primary | ICD-10-CM

## 2022-08-26 DIAGNOSIS — I10 BENIGN ESSENTIAL HYPERTENSION: ICD-10-CM

## 2022-08-26 DIAGNOSIS — M62.830 SPASM OF MUSCLE OF LOWER BACK: ICD-10-CM

## 2022-08-26 PROCEDURE — 99214 OFFICE O/P EST MOD 30 MIN: CPT | Performed by: NURSE PRACTITIONER

## 2022-08-26 RX ORDER — ROSUVASTATIN CALCIUM 5 MG/1
5 TABLET, COATED ORAL
Qty: 90 TABLET | Refills: 1 | Status: SHIPPED | OUTPATIENT
Start: 2022-08-26

## 2022-08-26 RX ORDER — DEXTROMETHORPHAN HYDROBROMIDE, GUAIFENESIN 5; 100 MG/5ML; MG/5ML
650 LIQUID ORAL EVERY 8 HOURS
COMMUNITY

## 2022-08-26 RX ORDER — AMLODIPINE BESYLATE 5 MG/1
TABLET ORAL
Qty: 90 TABLET | Refills: 1 | Status: SHIPPED | OUTPATIENT
Start: 2022-08-26

## 2022-08-26 NOTE — PROGRESS NOTES
[facility] Note  Subjective:      Mabel Contreras is a 59 y.o. female who presents for follow up on chronic problems, she has history of hypertension, hyperlipidemia and obesity. Taking medications as prescribed, no medications side effects reported. Today she is complaining of muscle spasm in both thighs that is happening more frequently. Past Medical History:   Diagnosis Date    Allergic rhinitis     Angioedema     Back pain 2009    Benign essential hypertension 2010    DDD (degenerative disc disease), lumbar 2021    Mild degenerative spondylosis, Xrays 2016    GERD (gastroesophageal reflux disease)     History of angioedema 2020    History of asthma 2017    Hypercholesterolemia     Perennial allergic rhinitis 5/10/2021    S/P Immunotherapy ~ 2000 x 5 yrs; Dr. Rashid [place]    Prediabetes 2020    Vitamin D deficiency 2017     Past Surgical History:   Procedure Laterality Date    HX BREAST BIOPSY Right 2013    BENIGN    HX CARPAL TUNNEL RELEASE Bilateral     HX  SECTION  , 2002    HX COLONOSCOPY      HX DILATION AND CURETTAGE       Current Outpatient Medications   Medication Sig Dispense Refill    ferrous fumarate/vit Bcomp,C (SUPER B COMPLEX PO) Take  by mouth. acetaminophen (Tylenol Arthritis Pain) 650 mg TbER Take 650 mg by mouth every eight (8) hours. rosuvastatin (CRESTOR) 5 mg tablet Take 1 Tablet by mouth nightly. 90 Tablet 1    amLODIPine (NORVASC) 5 mg tablet Take I tab po daily 90 Tablet 1    lidocaine (LIDODERM) 5 % Apply patch to the affected area for 12 hours a day and remove for 12 hours a day. 30 Patch 0    raNITIdine (Acid Reducer) 150 mg tablet TAKE 1 TABLET BY MOUTH TWICE DAILY 190 Tablet 0    spironolactone (ALDACTONE) 50 mg tablet TAKE 1 TABLET BY MOUTH ONE TIME DAILY 90 Tablet 3    OTHER Take  by mouth daily. Turmeric liquid. 1 tsp      TURMERIC PO Take  by mouth daily.       fexofenadine (ALLEGRA) 180 mg tablet Take 1 Tab by mouth daily. 90 Tab 0    EPINEPHrine (EPIPEN) 0.3 mg/0.3 mL injection       famotidine (PEPCID) 20 mg tablet Take 20 mg by mouth two (2) times daily as needed for Heartburn. Breo Ellipta 100-25 mcg/dose inhaler TAKE 1 PUFF BY INHALATION DAILY 1 Inhaler 5    omega 3-dha-epa-fish oil 100-160-1,000 mg cap Take  by mouth.      levocetirizine (XYZAL) 5 mg tablet Take  by mouth. albuterol (PROVENTIL HFA, VENTOLIN HFA, PROAIR HFA) 90 mcg/actuation inhaler Take 1-2 Puffs by inhalation every four (4) hours as needed for Wheezing or Shortness of Breath. 1 Inhaler 2    aspirin 81 mg tablet Take 81 mg by mouth daily. ascorbic acid, vitamin C, (VITAMIN C) 500 mg tablet Take 500 mg by mouth daily. cholecalciferol (VITAMIN D3) 25 mcg (1,000 unit) cap Take 1 capsule by mouth daily. multivitamin (ONE A DAY) tablet Take 1 Tab by mouth daily. estradiol-norethindrone (ACTIVELLA) 0.5-0.1 mg per tablet estradiol-norethindrone acet 1 mg-0.5 mg tablet (Patient not taking: No sig reported)      buPROPion XL (WELLBUTRIN XL) 150 mg tablet Wellbutrin  mg 24 hr tablet, extended release   Take 1 tablet every day by oral route in the morning.  (Patient not taking: No sig reported)       Allergies   Allergen Reactions    Latex Swelling     Swelling in face when latex gloves were used at dentist office    Other Food Itching     Peas, raw broccoli, raw cauliflower, croutons    Hydrocodone-Acetaminophen Itching    Oxycodone Swelling     Swelling of hands    Peanut Shortness of Breath and Swelling     All nuts    Red Dye Itching and Swelling     #9    Shellfish Derived Swelling     shrimp    Ultracet [Tramadol-Acetaminophen] Swelling     Facial and mouth    Carrot Itching    Codeine Itching    Dilaudid [Hydromorphone] Itching and Nausea Only    Flexeril [Cyclobenzaprine] Itching    Naproxen Rash    Nucynta [Tapentadol] Itching    Pcn [Penicillins] Rash    Strawberry Itching    Tramadol Hives Biaxin [Clarithromycin] Itching and Nausea Only       ROS:   Complete review of systems was reviewed with pertinent information listed in HPI. Review of Systems   Constitutional: Negative. HENT: Negative. Respiratory: Negative. Cardiovascular: Negative. Gastrointestinal: Negative. Genitourinary: Negative. Musculoskeletal:         Muscle spasm     Objective:   Visit Vitals  /69 (BP Patient Position: Sitting)   Pulse 67   Temp 97.5 °F (36.4 °C) (Temporal)   Resp 16   Ht 5' 3\" (1.6 m)   Wt 229 lb 3.2 oz (104 kg)   SpO2 98%   BMI 40.60 kg/m²       Vitals and Nurse Documentation reviewed. Physical Exam  Constitutional:       Appearance: Normal appearance. She is obese. HENT:      Mouth/Throat:      Mouth: Mucous membranes are moist.   Cardiovascular:      Rate and Rhythm: Normal rate and regular rhythm. Pulses: Normal pulses. Heart sounds: Normal heart sounds. No murmur heard. Pulmonary:      Effort: Pulmonary effort is normal.      Breath sounds: Normal breath sounds. Abdominal:      General: Bowel sounds are normal.      Palpations: Abdomen is soft. Musculoskeletal:      Cervical back: Normal range of motion and neck supple. Neurological:      Mental Status: She is alert. Psychiatric:         Mood and Affect: Mood normal.         Thought Content: Thought content normal.       Assessment/Plan:     Diagnoses and all orders for this visit:    1. Hypercholesterolemia  -     METABOLIC PANEL, COMPREHENSIVE; Future  -     LIPID PANEL; Future  -     rosuvastatin (CRESTOR) 5 mg tablet; Take 1 Tablet by mouth nightly. 2. Benign essential hypertension  -     CBC W/O DIFF; Future  -     amLODIPine (NORVASC) 5 mg tablet; Take I tab po daily    3. Spasm of muscle of lower back      - Advised to take magnesium 500 mg      -Calcium 500 mg     4.  BMI 40.0-44.9, adult ([facility] [address]      Diet and exercise   Follow up in Feb 2023        Pt expressed understanding with the diagnosis and plan        Discussed expected course/resolution/complications of diagnosis in detail with patient. Medication risks/benefits/costs/interactions/alternatives discussed with patient. Pt was given an after visit summary which includes diagnoses, current medications & vitals.   Pt expressed understanding with the diagnosis and plan

## 2022-08-26 NOTE — PROGRESS NOTES
Chief Complaint   Patient presents with    Medication Refill     Follow up         1. \"Have you been to the ER, urgent care clinic since your last visit? Hospitalized since your last visit? \" No    2. \"Have you seen or consulted any other health care providers outside of the [address] [place] [address] since your last visit? \" Yes When: a month ago Where: to  [address] Reason for visit: follow up      3. For patients aged 39-70: Has the patient had a colonoscopy / FIT/ Cologuard? No      If the patient is female:    4. For patients aged 41-77: Has the patient had a mammogram within the past 2 years? Yes - no Care Gap present      5. For patients aged 21-65: Has the patient had a pap smear?  No         3 most recent PHQ Screens 8/26/2022   Little interest or pleasure in doing things Not at all   Feeling down, depressed, irritable, or hopeless Not at all   Total Score PHQ 2 0   Trouble falling or staying asleep, or sleeping too much -   Feeling tired or having little energy -   Poor appetite, weight loss, or overeating -   Feeling bad about yourself - or that you are a failure or have let yourself or your family down -   Trouble concentrating on things such as school, work, reading, or watching TV -   Moving or speaking so slowly that other people could have noticed; or the opposite being so fidgety that others notice -   Thoughts of being better off dead, or hurting yourself in some way -   PHQ 9 Score -   How difficult have these problems made it for you to do your work, take care of your home and get along with others -       Health Maintenance Due   Topic Date Due    Pneumococcal 0-64 years (1 - PCV) Never done    Cervical cancer screen  Never done    Shingrix Vaccine Age 50> (2 of 2) 08/21/2020    DTaP/Tdap/Td series (2 - Td or Tdap) 01/18/2021    A1C test (Diabetic or Prediabetic)  05/10/2022    Lipid Screen  05/10/2022

## 2022-08-27 LAB
ALB/GLOBRATIO, 58C: 1.8 (CALC) (ref 1–2.5)
ALBUMIN SERPL-MCNC: 4.4 G/DL (ref 3.6–5.1)
ALKALINE PHOSPHATASE, TOTAL, 25002000: 94 U/L (ref 37–153)
ALT SERPL-CCNC: 17 U/L (ref 6–29)
AST SERPL W P-5'-P-CCNC: 18 U/L (ref 10–35)
BILIRUB SERPL-MCNC: 0.5 MG/DL (ref 0.2–1.2)
BUN SERPL-MCNC: 19 MG/DL (ref 7–25)
BUN/CREATININE RATIO,BUCR: ABNORMAL (CALC) (ref 6–22)
CALCIUM SERPL-MCNC: 10.5 MG/DL (ref 8.6–10.4)
CHLORIDE SERPL-SCNC: 103 MMOL/L (ref 98–110)
CHOL/HDL RATIO,CHHDX: 2.5 (CALC)
CHOLEST SERPL-MCNC: 163 MG/DL
CO2 SERPL-SCNC: 31 MMOL/L (ref 20–32)
CREAT SERPL-MCNC: 0.8 MG/DL (ref 0.5–1.03)
EGFR: 85 ML/MIN/1.73M2
ERYTHROCYTE [DISTWIDTH] IN BLOOD BY AUTOMATED COUNT: 13.9 % (ref 11–15)
GLOBULIN,GLOB: 2.5 G/DL (CALC) (ref 1.9–3.7)
GLUCOSE SERPL-MCNC: 82 MG/DL (ref 65–99)
HCT VFR BLD AUTO: 38.1 % (ref 35–45)
HDLC SERPL-MCNC: 65 MG/DL
HGB BLD-MCNC: 12.4 G/DL (ref 11.7–15.5)
LDL-CHOLESTEROL: 85 MG/DL (CALC)
MCH RBC QN AUTO: 28.4 PG (ref 27–33)
MCHC RBC AUTO-ENTMCNC: 32.5 G/DL (ref 32–36)
MCV RBC AUTO: 87.2 FL (ref 80–100)
NON-HDL CHOLESTEROL, 011976: 98 MG/DL (CALC)
PLATELET # BLD AUTO: 389 THOUSAND/UL (ref 140–400)
PMV BLD AUTO: 10.1 FL (ref 7.5–12.5)
POTASSIUM SERPL-SCNC: 4.8 MMOL/L (ref 3.5–5.3)
PROT SERPL-MCNC: 6.9 G/DL (ref 6.1–8.1)
RBC # BLD AUTO: 4.37 MILLION/UL (ref 3.8–5.1)
SODIUM SERPL-SCNC: 141 MMOL/L (ref 135–146)
TRIGL SERPL-MCNC: 51 MG/DL (ref ?–150)
WBC # BLD AUTO: 5.6 THOUSAND/UL (ref 3.8–10.8)

## 2022-10-03 ENCOUNTER — TELEPHONE (OUTPATIENT)
Dept: FAMILY MEDICINE CLINIC | Age: 57
End: 2022-10-03

## 2022-10-03 NOTE — TELEPHONE ENCOUNTER
----- Message from Aldo Ding sent at 10/3/2022  1:59 PM EDT -----  Subject: Message to Provider    QUESTIONS  Information for Provider? Rogelio Muonz needs to know if she is supposed to   discontinue taking the calcium or magnesium. Please call her back. ---------------------------------------------------------------------------  --------------  Da CALDERON  [phone]; OK to leave message on voicemail  ---------------------------------------------------------------------------  --------------  SCRIPT ANSWERS  Relationship to Patient?  Self

## 2022-10-04 NOTE — TELEPHONE ENCOUNTER
Attempted to call patient. Left voicemail that she is to discontinue if she is no longer having cramps.

## 2022-11-28 ENCOUNTER — OFFICE VISIT (OUTPATIENT)
Dept: FAMILY MEDICINE CLINIC | Age: 57
End: 2022-11-28
Payer: COMMERCIAL

## 2022-11-28 VITALS
HEIGHT: 63 IN | OXYGEN SATURATION: 97 % | BODY MASS INDEX: 40.11 KG/M2 | RESPIRATION RATE: 18 BRPM | DIASTOLIC BLOOD PRESSURE: 78 MMHG | TEMPERATURE: 98.2 F | WEIGHT: 226.4 LBS | SYSTOLIC BLOOD PRESSURE: 128 MMHG | HEART RATE: 73 BPM

## 2022-11-28 DIAGNOSIS — M25.561 RIGHT KNEE PAIN, UNSPECIFIED CHRONICITY: Primary | ICD-10-CM

## 2022-11-28 DIAGNOSIS — M17.11 ARTHRITIS OF RIGHT KNEE: ICD-10-CM

## 2022-11-28 PROCEDURE — 3078F DIAST BP <80 MM HG: CPT | Performed by: NURSE PRACTITIONER

## 2022-11-28 PROCEDURE — 3074F SYST BP LT 130 MM HG: CPT | Performed by: NURSE PRACTITIONER

## 2022-11-28 PROCEDURE — 99213 OFFICE O/P EST LOW 20 MIN: CPT | Performed by: NURSE PRACTITIONER

## 2022-11-28 NOTE — PROGRESS NOTES
Chief Complaint   Patient presents with    Knee Pain     Right knee pain. 1. \"Have you been to the ER, urgent care clinic since your last visit? Hospitalized since your last visit? \" No    2. \"Have you seen or consulted any other health care providers outside of the [address] [place] [address] since your last visit? \" Yes Dr. Zaheer Mckeon for knee. 3. For patients aged 39-70: Has the patient had a colonoscopy / FIT/ Cologuard? No      If the patient is female:    4. For patients aged 41-77: Has the patient had a mammogram within the past 2 years? Yes - no Care Gap present      5. For patients aged 21-65: Has the patient had a pap smear?  No         3 most recent PHQ Screens 11/28/2022   Little interest or pleasure in doing things Not at all   Feeling down, depressed, irritable, or hopeless Not at all   Total Score PHQ 2 0   Trouble falling or staying asleep, or sleeping too much -   Feeling tired or having little energy -   Poor appetite, weight loss, or overeating -   Feeling bad about yourself - or that you are a failure or have let yourself or your family down -   Trouble concentrating on things such as school, work, reading, or watching TV -   Moving or speaking so slowly that other people could have noticed; or the opposite being so fidgety that others notice -   Thoughts of being better off dead, or hurting yourself in some way -   PHQ 9 Score -   How difficult have these problems made it for you to do your work, take care of your home and get along with others -       Health Maintenance Due   Topic Date Due    Pneumococcal 0-64 years (1 - PCV) Never done    Cervical cancer screen  Never done    Shingrix Vaccine Age 50> (2 of 2) 08/21/2020    DTaP/Tdap/Td series (2 - Td or Tdap) 01/18/2021    A1C test (Diabetic or Prediabetic)  05/10/2022    Flu Vaccine (1) 08/01/2022

## 2022-11-29 ENCOUNTER — OFFICE VISIT (OUTPATIENT)
Dept: FAMILY MEDICINE CLINIC | Age: 57
End: 2022-11-29
Payer: COMMERCIAL

## 2022-11-29 VITALS
RESPIRATION RATE: 17 BRPM | TEMPERATURE: 98.5 F | HEIGHT: 63 IN | DIASTOLIC BLOOD PRESSURE: 77 MMHG | BODY MASS INDEX: 40.22 KG/M2 | SYSTOLIC BLOOD PRESSURE: 117 MMHG | WEIGHT: 227 LBS | OXYGEN SATURATION: 98 % | HEART RATE: 74 BPM

## 2022-11-29 DIAGNOSIS — M25.561 ANTERIOR KNEE PAIN, RIGHT: Primary | ICD-10-CM

## 2022-11-29 PROCEDURE — 99213 OFFICE O/P EST LOW 20 MIN: CPT | Performed by: NURSE PRACTITIONER

## 2022-11-29 PROCEDURE — 3074F SYST BP LT 130 MM HG: CPT | Performed by: NURSE PRACTITIONER

## 2022-11-29 PROCEDURE — 3078F DIAST BP <80 MM HG: CPT | Performed by: NURSE PRACTITIONER

## 2022-11-29 RX ORDER — METHYLPREDNISOLONE 4 MG/1
TABLET ORAL
Qty: 1 DOSE PACK | Refills: 0 | Status: SHIPPED | OUTPATIENT
Start: 2022-11-29

## 2022-11-29 NOTE — PROGRESS NOTES
No chief complaint on file. 1. \"Have you been to the ER, urgent care clinic since your last visit? Hospitalized since your last visit? \" No    2. \"Have you seen or consulted any other health care providers outside of the [address] [place] [address] since your last visit? \" No     3. For patients aged 39-70: Has the patient had a colonoscopy / FIT/ Cologuard? No      If the patient is female:    4. For patients aged 41-77: Has the patient had a mammogram within the past 2 years? Yes - no Care Gap present      5. For patients aged 21-65: Has the patient had a pap smear?  No         3 most recent PHQ Screens 11/28/2022   Little interest or pleasure in doing things Not at all   Feeling down, depressed, irritable, or hopeless Not at all   Total Score PHQ 2 0   Trouble falling or staying asleep, or sleeping too much -   Feeling tired or having little energy -   Poor appetite, weight loss, or overeating -   Feeling bad about yourself - or that you are a failure or have let yourself or your family down -   Trouble concentrating on things such as school, work, reading, or watching TV -   Moving or speaking so slowly that other people could have noticed; or the opposite being so fidgety that others notice -   Thoughts of being better off dead, or hurting yourself in some way -   PHQ 9 Score -   How difficult have these problems made it for you to do your work, take care of your home and get along with others -       Health Maintenance Due   Topic Date Due    Pneumococcal 0-64 years (1 - PCV) Never done    Cervical cancer screen  Never done    Shingrix Vaccine Age 50> (2 of 2) 08/21/2020    DTaP/Tdap/Td series (2 - Td or Tdap) 01/18/2021    A1C test (Diabetic or Prediabetic)  05/10/2022    Flu Vaccine (1) 08/01/2022

## 2022-11-29 NOTE — LETTER
11/29/2022    Ms.  Kaylee Esparza  [address]  [phone]      Ms Kaylee Esparza is under my care, she will return to work on Tuesday December 6th    Please call me if you have any questions: [phone]    Sincerely,      Luci Radford NP

## 2022-11-29 NOTE — PROGRESS NOTES
[facility] Note  Subjective:      Chelsi Robison is a 59 y.o. female who presents for right knee pain for over a week. She has chronic knee pain and had had cortisone injection in August. She describes pain sharp, radiating down to her leg, pain is better with rest and worse with walking and standing. Rates pain 8/10. She stands in her job at [employer] She has appointment with ortho next Monday. Requesting off until next Monday. Past Medical History:   Diagnosis Date    Allergic rhinitis     Angioedema     Back pain 2009    Benign essential hypertension 2010    DDD (degenerative disc disease), lumbar 2021    Mild degenerative spondylosis, Xrays 2016    GERD (gastroesophageal reflux disease)     History of angioedema 2020    History of asthma 2017    Hypercholesterolemia     Perennial allergic rhinitis 5/10/2021    S/P Immunotherapy ~ 2000 x 5 yrs; Dr. Lewis Quinn Prediabetes 2020    Vitamin D deficiency 2017     Past Surgical History:   Procedure Laterality Date    HX BREAST BIOPSY Right 2013    BENIGN    HX CARPAL TUNNEL RELEASE Bilateral     HX  SECTION  , 2002    HX COLONOSCOPY      HX DILATION AND CURETTAGE       Current Outpatient Medications   Medication Sig Dispense Refill    methylPREDNISolone (MEDROL DOSEPACK) 4 mg tablet Use as directed 1 Dose Pack 0    rosuvastatin (CRESTOR) 5 mg tablet Take 1 Tablet by mouth nightly. 90 Tablet 1    amLODIPine (NORVASC) 5 mg tablet Take I tab po daily 90 Tablet 1    lidocaine (LIDODERM) 5 % Apply patch to the affected area for 12 hours a day and remove for 12 hours a day. 30 Patch 0    raNITIdine (Acid Reducer) 150 mg tablet TAKE 1 TABLET BY MOUTH TWICE DAILY 190 Tablet 0    OTHER Take  by mouth daily. Turmeric liquid. 1 tsp      TURMERIC PO Take  by mouth daily.  fexofenadine (ALLEGRA) 180 mg tablet Take 1 Tab by mouth daily.  90 Tab 0    buPROPion XL [facility] XL) 150 mg tablet Wellbutrin  mg 24 hr tablet, extended release   Take 1 tablet every day by oral route in the morning.  EPINEPHrine (EPIPEN) 0.3 mg/0.3 mL injection       famotidine (PEPCID) 20 mg tablet Take 20 mg by mouth two (2) times daily as needed for Heartburn.  Breo Ellipta 100-25 mcg/dose inhaler TAKE 1 PUFF BY INHALATION DAILY 1 Inhaler 5    omega 3-dha-epa-fish oil 100-160-1,000 mg cap Take  by mouth.  levocetirizine (XYZAL) 5 mg tablet Take  by mouth.  albuterol (PROVENTIL HFA, VENTOLIN HFA, PROAIR HFA) 90 mcg/actuation inhaler Take 1-2 Puffs by inhalation every four (4) hours as needed for Wheezing or Shortness of Breath. 1 Inhaler 2    aspirin 81 mg tablet Take 81 mg by mouth daily.  ascorbic acid, vitamin C, (VITAMIN C) 500 mg tablet Take 500 mg by mouth daily.  cholecalciferol (VITAMIN D3) 25 mcg (1,000 unit) cap Take 1 capsule by mouth daily.  multivitamin (ONE A DAY) tablet Take 1 Tab by mouth daily.  ferrous fumarate/vit Bcomp,C (SUPER B COMPLEX PO) Take  by mouth. (Patient not taking: Reported on 11/29/2022)      acetaminophen (TYLENOL) 650 mg TbER Take 650 mg by mouth every eight (8) hours.  (Patient not taking: No sig reported)      spironolactone (ALDACTONE) 50 mg tablet TAKE 1 TABLET BY MOUTH ONE TIME DAILY (Patient not taking: Reported on 11/29/2022) 90 Tablet 3    estradiol-norethindrone (ACTIVELLA) 0.5-0.1 mg per tablet estradiol-norethindrone acet 1 mg-0.5 mg tablet (Patient not taking: No sig reported)       Allergies   Allergen Reactions    Latex Swelling     Swelling in face when latex gloves were used at dentist office    Other Food Itching     Peas, raw broccoli, raw cauliflower, croutons    Hydrocodone-Acetaminophen Itching    Oxycodone Swelling     Swelling of hands    Peanut Shortness of Breath and Swelling     All nuts    Red Dye Itching and Swelling     #9    Shellfish Derived Swelling     shrimp    Ultracet [Tramadol-Acetaminophen] Swelling     Facial and mouth    Carrot Itching    Codeine Itching    Dilaudid [Hydromorphone] Itching and Nausea Only    Flexeril [Cyclobenzaprine] Itching    Naproxen Rash    Nucynta [Tapentadol] Itching    Pcn [Penicillins] Rash    Strawberry Itching    Tramadol Hives    Biaxin [Clarithromycin] Itching and Nausea Only       ROS:   Complete review of systems was reviewed with pertinent information listed in HPI. Review of Systems   Constitutional: Negative. HENT: Negative. Respiratory: Negative. Cardiovascular: Negative. Gastrointestinal: Negative. Genitourinary: Negative. Musculoskeletal:  Positive for joint pain. Objective:   Visit Vitals  /77 (BP 1 Location: Left upper arm, BP Patient Position: Sitting, BP Cuff Size: Adult)   Pulse 74   Temp 98.5 °F (36.9 °C) (Oral)   Resp 17   Ht 5' 3\" (1.6 m)   Wt 227 lb (103 kg)   SpO2 98%   BMI 40.21 kg/m²       Vitals and Nurse Documentation reviewed. Physical Exam  Constitutional:       Appearance: Normal appearance. She is obese. HENT:      Mouth/Throat:      Mouth: Mucous membranes are moist.   Cardiovascular:      Rate and Rhythm: Normal rate and regular rhythm. Pulses: Normal pulses. Heart sounds: Normal heart sounds. No murmur heard. Pulmonary:      Effort: Pulmonary effort is normal.      Breath sounds: Normal breath sounds. Abdominal:      General: Bowel sounds are normal.      Palpations: Abdomen is soft. Musculoskeletal:         General: Tenderness present. Cervical back: Normal range of motion and neck supple. Comments: Tenderness in anterior aspect of right knee, pain with walking and limited ROM  Using cane   Neurological:      Mental Status: She is alert. Assessment/Plan:     Diagnoses and all orders for this visit:    1.  Anterior knee pain, right  -     methylPREDNISolone (MEDROL DOSEPACK) 4 mg tablet; Use as directed          Pt expressed understanding with the diagnosis and plan        Discussed expected course/resolution/complications of diagnosis in detail with patient. Medication risks/benefits/costs/interactions/alternatives discussed with patient. Pt was given an after visit summary which includes diagnoses, current medications & vitals.   Pt expressed understanding with the diagnosis and plan

## 2022-12-05 ENCOUNTER — OFFICE VISIT (OUTPATIENT)
Dept: ORTHOPEDIC SURGERY | Age: 57
End: 2022-12-05
Payer: COMMERCIAL

## 2022-12-05 VITALS — WEIGHT: 227 LBS | BODY MASS INDEX: 40.22 KG/M2 | HEIGHT: 63 IN

## 2022-12-05 DIAGNOSIS — M17.11 PRIMARY OSTEOARTHRITIS OF RIGHT KNEE: Primary | ICD-10-CM

## 2022-12-05 DIAGNOSIS — M25.561 RIGHT KNEE PAIN, UNSPECIFIED CHRONICITY: ICD-10-CM

## 2022-12-05 PROCEDURE — 99203 OFFICE O/P NEW LOW 30 MIN: CPT | Performed by: PHYSICIAN ASSISTANT

## 2022-12-05 PROCEDURE — 20610 DRAIN/INJ JOINT/BURSA W/O US: CPT | Performed by: PHYSICIAN ASSISTANT

## 2022-12-05 RX ORDER — METHYLPREDNISOLONE ACETATE 80 MG/ML
40 INJECTION, SUSPENSION INTRA-ARTICULAR; INTRALESIONAL; INTRAMUSCULAR; SOFT TISSUE ONCE
Status: COMPLETED | OUTPATIENT
Start: 2022-12-05 | End: 2022-12-05

## 2022-12-05 RX ORDER — BUPIVACAINE HYDROCHLORIDE 7.5 MG/ML
5 INJECTION, SOLUTION EPIDURAL; RETROBULBAR ONCE
Status: COMPLETED | OUTPATIENT
Start: 2022-12-05 | End: 2022-12-05

## 2022-12-05 RX ADMIN — METHYLPREDNISOLONE ACETATE 40 MG: 80 INJECTION, SUSPENSION INTRA-ARTICULAR; INTRALESIONAL; INTRAMUSCULAR; SOFT TISSUE at 09:10

## 2022-12-05 RX ADMIN — BUPIVACAINE HYDROCHLORIDE 37.5 MG: 7.5 INJECTION, SOLUTION EPIDURAL; RETROBULBAR at 09:09

## 2022-12-05 NOTE — LETTER
NOTIFICATION RETURN TO WORK / SCHOOL    12/5/2022 9:11 AM    Ms. [address]  [address] [facility] [id number]      To Whom It May Concern:    [address] is currently under the care of [facility]. Please allow her to return to work, sedentary work only for 4-6 weeks while she does physical therapy. If there are questions or concerns please have the patient contact our office.         Sincerely,      Joseph Miller PA-C

## 2022-12-05 NOTE — PROGRESS NOTES
Priya Johnson (: 1965) is a 59 y.o. female, patient, here for evaluation of the following chief complaint(s):  Knee Pain (Right /)       SUBJECTIVE/OBJECTIVE:  Priya Johnson presents today complaining of progressive right knee pain. Pain has been present for 5 months, significantly worse over the last few weeks. She has anteromedial pain without radiation. Pain is constant, worse with increased activity. Significant stiffness upon standing. Wakening night pain is present. She has been taking Tylenol and using topical creams. She has been out of work by her PCP due to not being able to perform her job duties, she has worked at [employer] for almost 35 years. Prior treatment by Dr. Marcy Hubbard includes periodic cortisone injections, last being July. She saw him again 2 weeks ago where PT was recommended. Patient states he \"wouldn't work with me\" regarding work restrictions and treatment plan. PHYSICAL EXAM:  Vitals: Ht 5' 3\" (1.6 m)   Wt 227 lb (103 kg)   BMI 40.21 kg/m²   Body mass index is 40.21 kg/m². 59y.o. year old F in no acute distress. Tearful throughout exam.  Obese, but otherwise appears well. Ambulates with a significant limp on the right, cane for assistance. Negative Stinchfield on the right with painless passive range of motion of the right hip. Varus alignment right knee. Medial joint line tenderness to palpation. Range of motion 0-105 degrees with crepitus. Motor 5/5. No distal edema. IMAGING:  Radiographs: XR Results (most recent):  Results from Appointment encounter on 22    XR KNEE RT MIN 4 V    Narrative  Four views (AP, PA-flex, lateral & sunrise) of the right knee were taken and reviewed today using digital radiography which reveal moderate loss medial and patellofemoral joint space. ASSESSMENT/PLAN:  1.  Primary osteoarthritis of right knee  -     bupivacaine (PF) (MARCAINE) 0.75 % (7.5 mg/mL) injection 37.5 mg; 37.5 mg (5 mL), Intra artICUlar, ONCE, 1 dose, On Mon 12/5/22 at 1000  -     methylPREDNISolone acetate (DEPO-MEDROL) 80 mg/mL injection 40 mg; 40 mg, Intra artICUlar, ONCE, 1 dose, On Mon 12/5/22 at 1000  -     REFERRAL TO PHYSICAL THERAPY  2. Right knee pain, unspecified chronicity  -     XR KNEE RT MIN 4 V; Future    The xray and exam findings were discussed with the patient today. Moderate right knee OA. Discussed risks/benefits of conservative vs. operative interventions at this time to include NSAIDs, PT, cortisone injections, and surgery. She agreed to try a cortisone injection today coupled with formal physical therapy for strengthening. Hopefully this will allow her to get back on her feet and get back to work. In the interim, I will provide her a note that states she needs to have a sedentary job for the next 4-6 weeks. If this does not help, we could try anti-inflammatories. Discussed natural disease progression and possible need for surgery in the future. Follow-up as needed. I explained that our goal is to get her back to full function and that aside from the current work restrictions provided for sedentary duty, the only other disability type form we could do would be FMLA surrounding surgery and rehab. We do not plan on doing any short/long term disability for the nonoperative phase. Discussed risks/benefits of cortisone injection and patient gave verbal consent. Under sterile conditions, the right knee was injected with 5cc 0.75% Bupivacaine and 0.5cc 80mg Depo Medrol intra-articularly, tolerated the procedure well. Return if symptoms worsen or fail to improve. Review Of Systems  ROS    Positive for: Musculoskeletal  Last edited by Oleh Sever on 12/5/2022  8:40 AM.         Patient denies any recent fever, chills, nausea, vomiting, chest pain, or shortness of breath.     Allergies   Allergen Reactions    Latex Swelling     Swelling in face when latex gloves were used at dentist office    Other Food Itching     Peas, raw broccoli, raw cauliflower, croutons    Hydrocodone-Acetaminophen Itching    Oxycodone Swelling     Swelling of hands    Peanut Shortness of Breath and Swelling     All nuts    Red Dye Itching and Swelling     #9    Shellfish Derived Swelling     shrimp    Ultracet [Tramadol-Acetaminophen] Swelling     Facial and mouth    Carrot Itching    Codeine Itching    Dilaudid [Hydromorphone] Itching and Nausea Only    Flexeril [Cyclobenzaprine] Itching    Naproxen Rash    Nucynta [Tapentadol] Itching    Pcn [Penicillins] Rash    Strawberry Itching    Tramadol Hives    Biaxin [Clarithromycin] Itching and Nausea Only       Current Outpatient Medications   Medication Sig    rosuvastatin (CRESTOR) 5 mg tablet Take 1 Tablet by mouth nightly. amLODIPine (NORVASC) 5 mg tablet Take I tab po daily    lidocaine (LIDODERM) 5 % Apply patch to the affected area for 12 hours a day and remove for 12 hours a day. TURMERIC PO Take  by mouth daily. fexofenadine (ALLEGRA) 180 mg tablet Take 1 Tab by mouth daily. buPROPion XL (WELLBUTRIN XL) 150 mg tablet Wellbutrin  mg 24 hr tablet, extended release   Take 1 tablet every day by oral route in the morning. EPINEPHrine (EPIPEN) 0.3 mg/0.3 mL injection     famotidine (PEPCID) 20 mg tablet Take 20 mg by mouth two (2) times daily as needed for Heartburn. Breo Ellipta 100-25 mcg/dose inhaler TAKE 1 PUFF BY INHALATION DAILY    albuterol (PROVENTIL HFA, VENTOLIN HFA, PROAIR HFA) 90 mcg/actuation inhaler Take 1-2 Puffs by inhalation every four (4) hours as needed for Wheezing or Shortness of Breath. aspirin 81 mg tablet Take 81 mg by mouth daily. ascorbic acid, vitamin C, (VITAMIN C) 500 mg tablet Take 500 mg by mouth daily. cholecalciferol (VITAMIN D3) 25 mcg (1,000 unit) cap Take 1 capsule by mouth daily. multivitamin (ONE A DAY) tablet Take 1 Tab by mouth daily.     methylPREDNISolone (MEDROL DOSEPACK) 4 mg tablet Use as directed (Patient not taking: Reported on 2022)    ferrous fumarate/vit Bcomp,C (SUPER B COMPLEX PO) Take  by mouth. (Patient not taking: No sig reported)    acetaminophen (TYLENOL) 650 mg TbER Take 650 mg by mouth every eight (8) hours. (Patient not taking: No sig reported)    raNITIdine (Acid Reducer) 150 mg tablet TAKE 1 TABLET BY MOUTH TWICE DAILY    spironolactone (ALDACTONE) 50 mg tablet TAKE 1 TABLET BY MOUTH ONE TIME DAILY (Patient not taking: No sig reported)    estradiol-norethindrone (ACTIVELLA) 0.5-0.1 mg per tablet estradiol-norethindrone acet 1 mg-0.5 mg tablet (Patient not taking: No sig reported)    omega 3-dha-epa-fish oil 100-160-1,000 mg cap Take  by mouth.    levocetirizine (XYZAL) 5 mg tablet Take  by mouth. No current facility-administered medications for this visit.        Past Medical History:   Diagnosis Date    Allergic rhinitis     Angioedema     Back pain 2009    Benign essential hypertension 2010    DDD (degenerative disc disease), lumbar 2021    Mild degenerative spondylosis, Xrays 2016    GERD (gastroesophageal reflux disease)     History of angioedema 2020    History of asthma 2017    Hypercholesterolemia     Perennial allergic rhinitis 5/10/2021    S/P Immunotherapy ~ 2000 x 5 yrs; Dr. Yusef Blandon    Prediabetes 2020    Vitamin D deficiency 2017        Past Surgical History:   Procedure Laterality Date    HX BREAST BIOPSY Right 2013    BENIGN    HX CARPAL TUNNEL RELEASE Bilateral     HX  SECTION  , 2002    HX COLONOSCOPY      HX DILATION AND CURETTAGE         Family History   Problem Relation Age of Onset    Stroke Mother     Hypertension Mother     Cancer Father         PROSTATE    Diabetes Sister     Breast Cancer Maternal Aunt     Hypertension Brother     Cancer Paternal Grandmother         pancreatic ca    Breast Cancer Cousin     Anesth Problems Neg Hx         Social History     Socioeconomic History    Marital status:      Spouse name: Not on file    Number of children: Not on file    Years of education: Not on file    Highest education level: Not on file   Occupational History    Occupation: [employer]     Occupation: Part time  at [employer] as well   Tobacco Use    Smoking status: Never     Passive exposure: Past    Smokeless tobacco: Never   Vaping Use    Vaping Use: Never used   Substance and Sexual Activity    Alcohol use: Yes     Types: 1 Standard drinks or equivalent per week     Comment: occasional    Drug use: No    Sexual activity: Yes     Partners: Male     Comment: Menopause   Other Topics Concern     Service Not Asked    Blood Transfusions Not Asked    Caffeine Concern No     Comment: 1 serving of tea a day    Occupational Exposure Not Asked    Hobby Hazards Not Asked    Sleep Concern Not Asked    Stress Concern Not Asked    Weight Concern Not Asked    Special Diet No    Back Care Not Asked    Exercise No    Bike Helmet Not Asked    Seat Belt Not Asked    Self-Exams Not Asked   Social History Narrative        Works at [address] [place] [address],[address]: nothing special    Exercise: no regular exercise but remains active on her physically demanding jobs, walks alot    Caffeine: occasional coffee, no tea or soda except occ Ginger Ale    Weight: 230-240 range over the years         Social Determinants of Health     Financial Resource Strain: Low Risk     Difficulty of Paying Living Expenses: Not hard at all   Food Insecurity: No Food Insecurity    Worried About Running Out of Food in the Last Year: Never true    Ran Out of Food in the Last Year: Never true   Transportation Needs: Not on file   Physical Activity: Not on file   Stress: Not on file   Social Connections: Not on file   Intimate Partner Violence: Not on file   Housing Stability: Not on file       Orders Placed This Encounter    XR KNEE RT MIN 4 V     Standing Status:   Future     Number of Occurrences:   1     Standing Expiration Date:   12/6/2023 REFERRAL TO PHYSICAL THERAPY     Referral Priority:   Routine     Referral Type:   PT/OT/ST     Referral Reason:   Specialty Services Required     Number of Visits Requested:   1    bupivacaine (PF) (MARCAINE) 0.75 % (7.5 mg/mL) injection 37.5 mg    methylPREDNISolone acetate (DEPO-MEDROL) 80 mg/mL injection 40 mg      Bk Reynolds M.D. was available for immediate consultation as the supervising physician. An electronic signature was used to authenticate this note.   -- Gregoria Kulkarni PA-C

## 2022-12-05 NOTE — LETTER
12/5/2022    Patient: Michaela Parnell   YOB: 1965   Date of Visit: 12/5/2022     Shaista Schaffer NP  [address] [address]  Via In Basket    Dear Shaista Schaffer NP,      Thank you for referring Ms. Deborah Suarez to [facility] for evaluation. My notes for this consultation are attached. If you have questions, please do not hesitate to call me. I look forward to following your patient along with you.       Sincerely,    Bernie Jacob PA-C

## 2022-12-20 ENCOUNTER — DOCUMENTATION ONLY (OUTPATIENT)
Dept: ORTHOPEDIC SURGERY | Age: 57
End: 2022-12-20

## 2023-01-17 ENCOUNTER — OFFICE VISIT (OUTPATIENT)
Dept: FAMILY MEDICINE CLINIC | Age: 58
End: 2023-01-17
Payer: COMMERCIAL

## 2023-01-17 VITALS
HEART RATE: 70 BPM | DIASTOLIC BLOOD PRESSURE: 70 MMHG | WEIGHT: 234 LBS | BODY MASS INDEX: 41.46 KG/M2 | HEIGHT: 63 IN | TEMPERATURE: 98.6 F | SYSTOLIC BLOOD PRESSURE: 114 MMHG | RESPIRATION RATE: 16 BRPM | OXYGEN SATURATION: 96 %

## 2023-01-17 DIAGNOSIS — R73.03 PREDIABETES: ICD-10-CM

## 2023-01-17 DIAGNOSIS — I10 BENIGN ESSENTIAL HYPERTENSION: Primary | ICD-10-CM

## 2023-01-17 DIAGNOSIS — M25.561 ACUTE PAIN OF RIGHT KNEE: ICD-10-CM

## 2023-01-17 DIAGNOSIS — E78.00 HYPERCHOLESTEROLEMIA: ICD-10-CM

## 2023-01-17 PROCEDURE — 99214 OFFICE O/P EST MOD 30 MIN: CPT | Performed by: NURSE PRACTITIONER

## 2023-01-17 PROCEDURE — 3074F SYST BP LT 130 MM HG: CPT | Performed by: NURSE PRACTITIONER

## 2023-01-17 PROCEDURE — 3078F DIAST BP <80 MM HG: CPT | Performed by: NURSE PRACTITIONER

## 2023-01-17 NOTE — PROGRESS NOTES
[facility] [facility] Note  Subjective:      Desean Faulkner is a 59 y.o. female who presents for follow up on chronic problems, she has history hypertension, hyperlipidemia, prediabetes obesity and right knee pain. She is followed by orthopedics for her knee pain, she received cortisone injection and currently physical therapy twice a week. She reports that her knee pain has improved. Past Medical History:   Diagnosis Date    Allergic rhinitis     Angioedema     Back pain 2009    Benign essential hypertension 2010    DDD (degenerative disc disease), lumbar 2021    Mild degenerative spondylosis, Xrays 2016    GERD (gastroesophageal reflux disease)     History of angioedema 2020    History of asthma 2017    Hypercholesterolemia     Perennial allergic rhinitis 5/10/2021    S/P Immunotherapy ~ 2000 x 5 yrs; Dr. Tavarez Helio    Prediabetes 2020    Vitamin D deficiency 2017     Past Surgical History:   Procedure Laterality Date    HX BREAST BIOPSY Right 2013    BENIGN    HX CARPAL TUNNEL RELEASE Bilateral     HX  SECTION  , 2002    HX COLONOSCOPY      HX DILATION AND CURETTAGE       Current Outpatient Medications   Medication Sig Dispense Refill    ferrous fumarate/vit Bcomp,C (SUPER B COMPLEX PO) Take  by mouth. acetaminophen (TYLENOL) 650 mg TbER Take 650 mg by mouth every eight (8) hours. rosuvastatin (CRESTOR) 5 mg tablet Take 1 Tablet by mouth nightly. 90 Tablet 1    amLODIPine (NORVASC) 5 mg tablet Take I tab po daily 90 Tablet 1    lidocaine (LIDODERM) 5 % Apply patch to the affected area for 12 hours a day and remove for 12 hours a day. 30 Patch 0    raNITIdine (Acid Reducer) 150 mg tablet TAKE 1 TABLET BY MOUTH TWICE DAILY 190 Tablet 0    spironolactone (ALDACTONE) 50 mg tablet TAKE 1 TABLET BY MOUTH ONE TIME DAILY 90 Tablet 3    TURMERIC PO Take  by mouth daily. fexofenadine (ALLEGRA) 180 mg tablet Take 1 Tab by mouth daily.  90 Tab 0 buPROPion XL (WELLBUTRIN XL) 150 mg tablet Wellbutrin  mg 24 hr tablet, extended release   Take 1 tablet every day by oral route in the morning. EPINEPHrine (EPIPEN) 0.3 mg/0.3 mL injection       Breo Ellipta 100-25 mcg/dose inhaler TAKE 1 PUFF BY INHALATION DAILY 1 Inhaler 5    omega 3-dha-epa-fish oil 100-160-1,000 mg cap Take  by mouth.      levocetirizine (XYZAL) 5 mg tablet Take  by mouth. albuterol (PROVENTIL HFA, VENTOLIN HFA, PROAIR HFA) 90 mcg/actuation inhaler Take 1-2 Puffs by inhalation every four (4) hours as needed for Wheezing or Shortness of Breath. 1 Inhaler 2    aspirin 81 mg tablet Take 81 mg by mouth daily. ascorbic acid, vitamin C, (VITAMIN C) 500 mg tablet Take 500 mg by mouth daily. cholecalciferol (VITAMIN D3) 25 mcg (1,000 unit) cap Take 1 capsule by mouth daily. multivitamin (ONE A DAY) tablet Take 1 Tab by mouth daily. estradiol-norethindrone (ACTIVELLA) 0.5-0.1 mg per tablet estradiol-norethindrone acet 1 mg-0.5 mg tablet (Patient not taking: No sig reported)      famotidine (PEPCID) 20 mg tablet Take 20 mg by mouth two (2) times daily as needed for Heartburn.  (Patient not taking: Reported on 1/17/2023)       Allergies   Allergen Reactions    Latex Swelling     Swelling in face when latex gloves were used at dentist office    Other Food Itching     Peas, raw broccoli, raw cauliflower, croutons    Hydrocodone-Acetaminophen Itching    Oxycodone Swelling     Swelling of hands    Peanut Shortness of Breath and Swelling     All nuts    Red Dye Itching and Swelling     #9    Shellfish Derived Swelling     shrimp    Ultracet [Tramadol-Acetaminophen] Swelling     Facial and mouth    Carrot Itching    Codeine Itching    Dilaudid [Hydromorphone] Itching and Nausea Only    Flexeril [Cyclobenzaprine] Itching    Naproxen Rash    Nucynta [Tapentadol] Itching    Pcn [Penicillins] Rash    Strawberry Itching    Tramadol Hives    Biaxin [Clarithromycin] Itching and Nausea Only       ROS:   Complete review of systems was reviewed with pertinent information listed in HPI. Review of Systems   Constitutional: Negative. HENT: Negative. Respiratory: Negative. Cardiovascular: Negative. Gastrointestinal: Negative. Genitourinary: Negative. Musculoskeletal:  Positive for joint pain. Psychiatric/Behavioral: Negative. Objective:   Visit Vitals  /70 (BP 1 Location: Left arm, BP Patient Position: Sitting, BP Cuff Size: Large adult)   Pulse 70   Temp 98.6 °F (37 °C) (Temporal)   Resp 16   Ht 5' 3\" (1.6 m)   Wt 234 lb (106.1 kg)   SpO2 96%   BMI 41.45 kg/m²       Vitals and Nurse Documentation reviewed. Physical Exam  Constitutional:       Appearance: Normal appearance. HENT:      Mouth/Throat:      Mouth: Mucous membranes are moist.   Cardiovascular:      Rate and Rhythm: Normal rate and regular rhythm. Pulses: Normal pulses. Heart sounds: Normal heart sounds. No murmur heard. Pulmonary:      Effort: Pulmonary effort is normal.      Breath sounds: Normal breath sounds. Abdominal:      General: Bowel sounds are normal.      Palpations: Abdomen is soft. Musculoskeletal:         General: Tenderness present. Cervical back: Normal range of motion and neck supple. Comments: Right pain with walking, has knee brace on   Neurological:      Mental Status: She is alert. Psychiatric:         Mood and Affect: Mood normal.         Thought Content: Thought content normal.       Assessment/Plan:   Diagnoses and all orders for this visit:    1. Benign essential hypertension  -     CBC W/O DIFF; Future    2. Hypercholesterolemia  -     LIPID PANEL; Future  -     METABOLIC PANEL, COMPREHENSIVE; Future    3. BMI 40.0-44.9, adult ([address]    4. Prediabetes  -     HEMOGLOBIN A1C WITH EAG; Future    5.  Acute pain of right knee  Paper work filled out for [employer] with physical therapy             Pt expressed understanding with the diagnosis and plan        Discussed expected course/resolution/complications of diagnosis in detail with patient. Medication risks/benefits/costs/interactions/alternatives discussed with patient. Pt was given an after visit summary which includes diagnoses, current medications & vitals.   Pt expressed understanding with the diagnosis and plan

## 2023-01-17 NOTE — PROGRESS NOTES
Chief Complaint   Patient presents with    Knee Pain     Follow up for right knee    Medication Refill         1. \"Have you been to the ER, urgent care clinic since your last visit? Hospitalized since your last visit? \" No    2. \"Have you seen or consulted any other health care providers outside of the [address] [place] [address] since your last visit? \" No     3. For patients aged 39-70: Has the patient had a colonoscopy / FIT/ Cologuard? Yes - no Care Gap present      If the patient is female:    4. For patients aged 41-77: Has the patient had a mammogram within the past 2 years? No      5. For patients aged 21-65: Has the patient had a pap smear?  No         3 most recent PHQ Screens 1/17/2023   Little interest or pleasure in doing things More than half the days   Feeling down, depressed, irritable, or hopeless More than half the days   Total Score PHQ 2 4   Trouble falling or staying asleep, or sleeping too much Nearly every day   Feeling tired or having little energy Not at all   Poor appetite, weight loss, or overeating More than half the days   Feeling bad about yourself - or that you are a failure or have let yourself or your family down Nearly every day   Trouble concentrating on things such as school, work, reading, or watching TV Not at all   Moving or speaking so slowly that other people could have noticed; or the opposite being so fidgety that others notice Not at all   Thoughts of being better off dead, or hurting yourself in some way Not at all   PHQ 9 Score 12   How difficult have these problems made it for you to do your work, take care of your home and get along with others Somewhat difficult       Health Maintenance Due   Topic Date Due    Pneumococcal 0-64 years (1 - PCV) Never done    Cervical cancer screen  Never done    Shingles Vaccine (2 of 2) 08/21/2020    DTaP/Tdap/Td series (2 - Td or Tdap) 01/18/2021    A1C test (Diabetic or Prediabetic)  05/10/2022    Flu Vaccine (1) 08/01/2022

## 2023-01-18 LAB
ALB/GLOBRATIO, 58C: 1.6 (CALC) (ref 1–2.5)
ALBUMIN SERPL-MCNC: 4.4 G/DL (ref 3.6–5.1)
ALKALINE PHOSPHATASE, TOTAL, 25002000: 109 U/L (ref 37–153)
ALT SERPL-CCNC: 18 U/L (ref 6–29)
AST SERPL W P-5'-P-CCNC: 21 U/L (ref 10–35)
BASOPHILS # BLD: 42 CELLS/UL (ref 0–200)
BASOPHILS NFR BLD: 0.7 %
BILIRUB SERPL-MCNC: 0.4 MG/DL (ref 0.2–1.2)
BUN SERPL-MCNC: 13 MG/DL (ref 7–25)
BUN/CREATININE RATIO,BUCR: NORMAL (CALC) (ref 6–22)
CALCIUM SERPL-MCNC: 10.3 MG/DL (ref 8.6–10.4)
CHLORIDE SERPL-SCNC: 102 MMOL/L (ref 98–110)
CHOL/HDL RATIO,CHHDX: 2.5 (CALC)
CHOLEST SERPL-MCNC: 182 MG/DL
CO2 SERPL-SCNC: 31 MMOL/L (ref 20–32)
CREAT SERPL-MCNC: 0.69 MG/DL (ref 0.5–1.03)
EGFR: 100 ML/MIN/1.73M2
EOSINOPHIL # BLD: 90 CELLS/UL (ref 15–500)
EOSINOPHIL NFR BLD: 1.5 %
ERYTHROCYTE [DISTWIDTH] IN BLOOD BY AUTOMATED COUNT: 14.2 % (ref 11–15)
GLOBULIN,GLOB: 2.7 G/DL (CALC) (ref 1.9–3.7)
GLUCOSE SERPL-MCNC: 84 MG/DL (ref 65–139)
HBA1C MFR BLD HPLC: 5.5 % OF TOTAL HGB
HCT VFR BLD AUTO: 38.2 % (ref 35–45)
HDLC SERPL-MCNC: 74 MG/DL
HGB BLD-MCNC: 12.6 G/DL (ref 11.7–15.5)
LDL-CHOLESTEROL: 91 MG/DL (CALC)
LYMPHOCYTES # BLD: 2898 CELLS/UL (ref 850–3900)
LYMPHOCYTES NFR BLD: 48.3 %
MCH RBC QN AUTO: 27.5 PG (ref 27–33)
MCHC RBC AUTO-ENTMCNC: 33 G/DL (ref 32–36)
MCV RBC AUTO: 83.2 FL (ref 80–100)
MONOCYTES # BLD: 420 CELLS/UL (ref 200–950)
MONOCYTES NFR BLD: 7 %
NEUTROPHILS # BLD AUTO: 2550 CELLS/UL (ref 1500–7800)
NEUTROPHILS # BLD: 42.5 %
NON-HDL CHOLESTEROL, 011976: 108 MG/DL (CALC)
PLATELET # BLD AUTO: 334 THOUSAND/UL (ref 140–400)
PMV BLD AUTO: 10.1 FL (ref 7.5–12.5)
POTASSIUM SERPL-SCNC: 4.4 MMOL/L (ref 3.5–5.3)
PROT SERPL-MCNC: 7.1 G/DL (ref 6.1–8.1)
RBC # BLD AUTO: 4.59 MILLION/UL (ref 3.8–5.1)
SODIUM SERPL-SCNC: 142 MMOL/L (ref 135–146)
TRIGL SERPL-MCNC: 79 MG/DL (ref ?–150)
WBC # BLD AUTO: 6 THOUSAND/UL (ref 3.8–10.8)

## 2023-01-20 ENCOUNTER — TELEPHONE (OUTPATIENT)
Dept: FAMILY MEDICINE CLINIC | Age: 58
End: 2023-01-20

## 2023-01-20 NOTE — TELEPHONE ENCOUNTER
Patient called stating that she received a call with no VM left with it. Patient would like a call back, if it was concerning anything.     Best call back number  [phone]

## 2023-02-02 DIAGNOSIS — E78.00 HYPERCHOLESTEROLEMIA: ICD-10-CM

## 2023-02-02 DIAGNOSIS — I10 BENIGN ESSENTIAL HYPERTENSION: ICD-10-CM

## 2023-02-03 RX ORDER — ROSUVASTATIN CALCIUM 5 MG/1
TABLET, COATED ORAL
Qty: 90 TABLET | Refills: 0 | Status: SHIPPED | OUTPATIENT
Start: 2023-02-03

## 2023-02-03 RX ORDER — AMLODIPINE BESYLATE 5 MG/1
TABLET ORAL
Qty: 90 TABLET | Refills: 0 | Status: SHIPPED | OUTPATIENT
Start: 2023-02-03

## 2023-03-15 ENCOUNTER — TRANSCRIBE ORDER (OUTPATIENT)
Dept: SCHEDULING | Age: 58
End: 2023-03-15

## 2023-03-15 DIAGNOSIS — Z12.31 SCREENING MAMMOGRAM FOR HIGH-RISK PATIENT: Primary | ICD-10-CM

## 2023-04-23 DIAGNOSIS — Z12.31 SCREENING MAMMOGRAM FOR HIGH-RISK PATIENT: Primary | ICD-10-CM

## 2023-04-25 ENCOUNTER — HOSPITAL ENCOUNTER (OUTPATIENT)
Dept: MAMMOGRAPHY | Age: 58
Discharge: HOME | End: 2023-04-25
Attending: FAMILY MEDICINE
Payer: COMMERCIAL

## 2023-04-25 DIAGNOSIS — Z12.31 SCREENING MAMMOGRAM FOR HIGH-RISK PATIENT: ICD-10-CM

## 2023-04-25 PROCEDURE — 77063 BREAST TOMOSYNTHESIS BI: CPT

## 2023-06-06 ENCOUNTER — OFFICE VISIT (OUTPATIENT)
Age: 58
End: 2023-06-06
Payer: COMMERCIAL

## 2023-06-06 VITALS
HEART RATE: 72 BPM | BODY MASS INDEX: 40.22 KG/M2 | OXYGEN SATURATION: 98 % | DIASTOLIC BLOOD PRESSURE: 64 MMHG | WEIGHT: 227 LBS | TEMPERATURE: 97.9 F | RESPIRATION RATE: 16 BRPM | HEIGHT: 63 IN | SYSTOLIC BLOOD PRESSURE: 128 MMHG

## 2023-06-06 DIAGNOSIS — M17.11 LOCALIZED OSTEOARTHRITIS OF RIGHT KNEE: ICD-10-CM

## 2023-06-06 DIAGNOSIS — Z12.11 SCREENING FOR COLON CANCER: ICD-10-CM

## 2023-06-06 DIAGNOSIS — M54.2 NECK PAIN: ICD-10-CM

## 2023-06-06 DIAGNOSIS — Z71.89 CARDIAC RISK COUNSELING: ICD-10-CM

## 2023-06-06 DIAGNOSIS — I10 ESSENTIAL (PRIMARY) HYPERTENSION: Primary | ICD-10-CM

## 2023-06-06 PROCEDURE — 3074F SYST BP LT 130 MM HG: CPT | Performed by: FAMILY MEDICINE

## 2023-06-06 PROCEDURE — 99214 OFFICE O/P EST MOD 30 MIN: CPT | Performed by: FAMILY MEDICINE

## 2023-06-06 PROCEDURE — 3078F DIAST BP <80 MM HG: CPT | Performed by: FAMILY MEDICINE

## 2023-06-06 RX ORDER — CIPROFLOXACIN 500 MG/1
TABLET, FILM COATED ORAL
COMMUNITY
End: 2023-06-06

## 2023-06-06 RX ORDER — DICLOFENAC SODIUM 75 MG/1
TABLET, DELAYED RELEASE ORAL 2 TIMES DAILY
COMMUNITY
Start: 2022-08-29

## 2023-06-06 RX ORDER — IBUPROFEN 800 MG/1
TABLET ORAL
COMMUNITY

## 2023-06-06 RX ORDER — CHLORAL HYDRATE 500 MG
CAPSULE ORAL
COMMUNITY

## 2023-06-06 RX ORDER — ASPIRIN 81 MG/1
81 TABLET, CHEWABLE ORAL DAILY
COMMUNITY

## 2023-06-06 RX ORDER — METHOCARBAMOL 500 MG/1
TABLET, FILM COATED ORAL
COMMUNITY

## 2023-06-06 RX ORDER — FAMOTIDINE 10 MG
TABLET ORAL
COMMUNITY
End: 2023-06-06

## 2023-06-06 RX ORDER — DOXYCYCLINE 100 MG/1
TABLET ORAL
COMMUNITY
End: 2023-06-06 | Stop reason: CLARIF

## 2023-06-06 SDOH — ECONOMIC STABILITY: INCOME INSECURITY: HOW HARD IS IT FOR YOU TO PAY FOR THE VERY BASICS LIKE FOOD, HOUSING, MEDICAL CARE, AND HEATING?: NOT HARD AT ALL

## 2023-06-06 SDOH — ECONOMIC STABILITY: HOUSING INSECURITY
IN THE LAST 12 MONTHS, WAS THERE A TIME WHEN YOU DID NOT HAVE A STEADY PLACE TO SLEEP OR SLEPT IN A SHELTER (INCLUDING NOW)?: NO

## 2023-06-06 SDOH — ECONOMIC STABILITY: FOOD INSECURITY: WITHIN THE PAST 12 MONTHS, THE FOOD YOU BOUGHT JUST DIDN'T LAST AND YOU DIDN'T HAVE MONEY TO GET MORE.: NEVER TRUE

## 2023-06-06 SDOH — ECONOMIC STABILITY: FOOD INSECURITY: WITHIN THE PAST 12 MONTHS, YOU WORRIED THAT YOUR FOOD WOULD RUN OUT BEFORE YOU GOT MONEY TO BUY MORE.: NEVER TRUE

## 2023-06-06 ASSESSMENT — PATIENT HEALTH QUESTIONNAIRE - PHQ9
SUM OF ALL RESPONSES TO PHQ QUESTIONS 1-9: 0
SUM OF ALL RESPONSES TO PHQ9 QUESTIONS 1 & 2: 0
SUM OF ALL RESPONSES TO PHQ QUESTIONS 1-9: 0
1. LITTLE INTEREST OR PLEASURE IN DOING THINGS: 0
2. FEELING DOWN, DEPRESSED OR HOPELESS: 0

## 2023-06-06 ASSESSMENT — ENCOUNTER SYMPTOMS
CONSTIPATION: 0
COUGH: 0
DIARRHEA: 0
NAUSEA: 0
SHORTNESS OF BREATH: 0
ABDOMINAL PAIN: 0
CHEST TIGHTNESS: 0
VOMITING: 0
WHEEZING: 0

## 2023-06-06 NOTE — PROGRESS NOTES
08/21/2020    DTaP/Tdap/Td vaccine (2 - Td or Tdap) 01/18/2021    COVID-19 Vaccine (6 - Booster for Moderna series) 12/28/2022
pillow. 5. Screening for colon cancer  GI clinic info given. No orders of the defined types were placed in this encounter. Medications Discontinued During This Encounter   Medication Reason    ciprofloxacin (CIPRO) 500 MG tablet LIST CLEANUP    famotidine (PEPCID) 10 MG tablet LIST CLEANUP    fluticasone furoate-vilanterol (BREO ELLIPTA) 100-25 MCG/ACT inhaler ERROR    fexofenadine (ALLEGRA) 180 MG tablet ERROR    Estradiol-Norethindrone Acet 0.5-0.1 MG TABS ERROR    raNITIdine (ZANTAC) 150 MG tablet ERROR    spironolactone (ALDACTONE) 50 MG tablet ERROR    doxycycline monohydrate (ADOXA) 100 MG tablet ERROR    albuterol sulfate HFA (PROVENTIL;VENTOLIN;PROAIR) 108 (90 Base) MCG/ACT inhaler ERROR       Return in about 6 months (around 12/6/2023) for HTN. Treatment risks/benefits/costs/interactions/alternatives discussed with patient. Advised patient to call back or return to office if symptoms worsen/change/persist. If patient cannot reach us or should anything more severe/urgent arise he/she should proceed directly to the nearest emergency department. Discussed expected course/resolution/complications of diagnosis in detail with patient. Patient expressed understanding with the diagnosis and plan. This dictation may have been completed with Dragon, the computer voice recognition software. Unanticipated grammatical, syntax, homophones, and other interpretive errors are sometimes inadvertently transcribed by the computer software. Please disregard any errors that have escaped final proofreading. Miguel Dumont M.D.

## 2023-06-06 NOTE — PATIENT INSTRUCTIONS
Please call one of the offices to schedule your colonoscopy/GI needs:    [facility] [address] [facility], [place] [address], [address]  [phone]    [facility]  [address] [facility]  [address] [facility], [address]  [phone]    To schedule the CT scan that I ordered for you, please call Central Scheduling at [phone]. Please schedule a pap smear with your obgyn.

## 2023-06-26 RX ORDER — LIDOCAINE 50 MG/G
PATCH TOPICAL
Qty: 30 PATCH | Refills: 0 | Status: CANCELLED | OUTPATIENT
Start: 2023-06-26

## 2023-06-26 NOTE — TELEPHONE ENCOUNTER
Last appointment: 6/6/23 MD Kassidy Cash  Next appointment: None  Previous refill encounter(s): 12/21/21 30    Requested Prescriptions     Pending Prescriptions Disp Refills    lidocaine (LIDODERM) 5 % 30 patch 0     Sig: Apply patch to the affected area for 12 hours a day and remove for 12 hours a day.      For Pharmacy Admin Tracking Only    Program: Medication Refill  Intervention Detail: New Rx: 1, reason: Patient Preference  Time Spent (min): 5

## 2023-07-25 RX ORDER — ROSUVASTATIN CALCIUM 5 MG/1
5 TABLET, COATED ORAL NIGHTLY
Qty: 90 TABLET | Refills: 1 | Status: SHIPPED | OUTPATIENT
Start: 2023-07-25

## 2023-07-25 NOTE — TELEPHONE ENCOUNTER
Last appointment: 6/6/23 MD Antonella Douglass, labs 1/2023  Next appointment: None  Previous refill encounter(s): 4/28/23 90    Requested Prescriptions     Pending Prescriptions Disp Refills    rosuvastatin (CRESTOR) 5 MG tablet 90 tablet 1     Sig: Take 1 tablet by mouth nightly     For Pharmacy Admin Tracking Only    Program: Medication Refill  CPA in place:    Recommendation Provided To:    Intervention Detail: New Rx: 1, reason: Patient Preference  Intervention Accepted By:   Angie Gutierres Closed?:    Time Spent (min): 5

## 2023-08-14 ENCOUNTER — TELEMEDICINE (OUTPATIENT)
Age: 58
End: 2023-08-14
Payer: COMMERCIAL

## 2023-08-14 DIAGNOSIS — U07.1 COVID-19: Primary | ICD-10-CM

## 2023-08-14 PROCEDURE — 99214 OFFICE O/P EST MOD 30 MIN: CPT | Performed by: STUDENT IN AN ORGANIZED HEALTH CARE EDUCATION/TRAINING PROGRAM

## 2023-08-14 NOTE — PROGRESS NOTES
diclofenac (VOLTAREN) 75 MG EC tablet Take by mouth 2 times daily      acetaminophen (TYLENOL) 650 MG extended release tablet Take 1 tablet by mouth in the morning and 1 tablet at noon and 1 tablet in the evening. amLODIPine (NORVASC) 5 MG tablet TAKE 1 TABLET BY MOUTH ONE TIME DAILY      ascorbic acid (VITAMIN C) 500 MG tablet Take 1 tablet by mouth daily      famotidine (PEPCID) 20 MG tablet Take 1 tablet by mouth 2 times daily as needed      Omega-3 Fatty Acids (FISH OIL) 1000 MG capsule Fish Oil      ibuprofen (ADVIL;MOTRIN) 800 MG tablet ibuprofen 800 mg tablet   Take 1 tablet 3 times a day by oral route. methocarbamol (ROBAXIN) 500 MG tablet methocarbamol 500 mg tablet (Patient not taking: Reported on 8/14/2023)      TURMERIC PO Take by mouth daily      vitamin D 25 MCG (1000 UT) CAPS Take 1 capsule by mouth daily      EPINEPHrine (EPIPEN) 0.3 MG/0.3ML SOAJ injection ceived the following from [facility]: Outside name: EPINEPHrine (EPIPEN) 0.3 mg/0.3 mL injection (Patient not taking: Reported on 6/6/2023)      levocetirizine (XYZAL) 5 MG tablet Take by mouth      lidocaine (LIDODERM) 5 % Apply patch to the affected area for 12 hours a day and remove for 12 hours a day. No current facility-administered medications for this visit.      Allergies   Allergen Reactions    Latex Swelling     Swelling in face when latex gloves were used at dentist office    Hydrocodone-Acetaminophen Itching    Oxycodone Swelling     Swelling of hands    Red Dye Itching and Swelling     #9    Shellfish Allergy Swelling     shrimp    Tramadol-Acetaminophen Swelling     Facial and mouth    Carrot Itching    Codeine Itching    Cyclobenzaprine Itching    Hydromorphone Itching and Nausea Only    Naproxen Rash    Other Itching     Peas, raw broccoli, raw cauliflower, croutons    Penicillins Rash    Strawberry Itching    Tapentadol Itching    Tramadol Hives    Clarithromycin Itching and Nausea Only

## 2023-09-13 RX ORDER — AMLODIPINE BESYLATE 5 MG/1
TABLET ORAL
Qty: 90 TABLET | Refills: 1 | Status: SHIPPED | OUTPATIENT
Start: 2023-09-13

## 2023-09-13 NOTE — TELEPHONE ENCOUNTER
PCP: Guera Kendall MD    Last appt: 8/14/2023   Future Appointments   Date Time Provider [address]   9/25/2023  2:00 PM Milla Stone MD [facility]       Requested Prescriptions     Pending Prescriptions Disp Refills    amLODIPine (NORVASC) 5 MG tablet [Pharmacy Med Name: amLODIPine Besylate Oral Tablet 5 MG] 90 tablet 0     Sig: TAKE 1 TABLET BY MOUTH ONE TIME DAILY         Prior labs and Blood pressures:  BP Readings from Last 3 Encounters:   06/06/23 128/64   01/17/23 114/70   11/29/22 117/77     Lab Results   Component Value Date/Time     01/17/2023 10:31 AM    K 4.4 01/17/2023 10:31 AM     01/17/2023 10:31 AM    CO2 31 01/17/2023 10:31 AM    BUN 13 01/17/2023 10:31 AM    GFRAA 96 05/10/2021 09:06 AM     No results found for: \"HBA1C\", \"[id number]\"  Lab Results   Component Value Date/Time    CHOL 182 01/17/2023 10:31 AM    HDL 74 01/17/2023 10:31 AM     No results found for: \"VITD3\", \"VD3RIA\"    Lab Results   Component Value Date/Time    TSH 0.54 05/10/2021 09:06 AM

## 2023-09-25 ENCOUNTER — OFFICE VISIT (OUTPATIENT)
Age: 58
End: 2023-09-25
Payer: COMMERCIAL

## 2023-09-25 ENCOUNTER — TELEPHONE (OUTPATIENT)
Age: 58
End: 2023-09-25

## 2023-09-25 VITALS
DIASTOLIC BLOOD PRESSURE: 70 MMHG | HEART RATE: 83 BPM | SYSTOLIC BLOOD PRESSURE: 120 MMHG | OXYGEN SATURATION: 98 % | WEIGHT: 232.2 LBS | BODY MASS INDEX: 41.14 KG/M2 | TEMPERATURE: 97.1 F | RESPIRATION RATE: 14 BRPM | HEIGHT: 63 IN

## 2023-09-25 DIAGNOSIS — M54.2 CHRONIC NECK PAIN: Primary | ICD-10-CM

## 2023-09-25 DIAGNOSIS — G89.29 CHRONIC NECK PAIN: Primary | ICD-10-CM

## 2023-09-25 PROCEDURE — 99214 OFFICE O/P EST MOD 30 MIN: CPT | Performed by: FAMILY MEDICINE

## 2023-09-25 PROCEDURE — 3078F DIAST BP <80 MM HG: CPT | Performed by: FAMILY MEDICINE

## 2023-09-25 PROCEDURE — 3074F SYST BP LT 130 MM HG: CPT | Performed by: FAMILY MEDICINE

## 2023-09-25 RX ORDER — FEXOFENADINE HCL 180 MG/1
TABLET ORAL
COMMUNITY

## 2023-09-25 ASSESSMENT — ENCOUNTER SYMPTOMS
ABDOMINAL PAIN: 0
CONSTIPATION: 0
NAUSEA: 0
VOMITING: 0
CHEST TIGHTNESS: 0
SHORTNESS OF BREATH: 0
DIARRHEA: 0
COUGH: 0
WHEEZING: 0

## 2023-09-25 NOTE — TELEPHONE ENCOUNTER
Spoke with pt called about the orthopedic referral for her chronic neck pain. Pt called  stating there next available for her to be seen is 12/2023. Pt wants to know if she can be referred elsewhere for a sooner date.  Best call back number [phone]

## 2023-09-27 NOTE — TELEPHONE ENCOUNTER
Called patient. Two patient identifiers verified. Discussed xray results per provider's note. Verbalized understanding. Patient will call [facility].  And then let know about referral.

## 2023-11-29 RX ORDER — IBUPROFEN 800 MG/1
TABLET ORAL
Qty: 30 TABLET | Refills: 0 | OUTPATIENT
Start: 2023-11-29

## 2023-11-29 NOTE — TELEPHONE ENCOUNTER
Refill request from pharamcy for Ibuprofen 800mg.  Historical provider.  Veronica yogi    Last appointment: 9/26/23 Wayne  Next appointment: None  Previous refill encounter(s): historical provider    Requested Prescriptions     Pending Prescriptions Disp Refills     MG tablet [Pharmacy Med Name: IBU Oral Tablet 800 MG] 30 tablet 0     Sig: TAKE ONE TABLET BY MOUTH EVERY EIGHT HOURS AS NEEDED FOR PAIN     For Pharmacy Admin Tracking Only    Program: Medication Refill  CPA in place:    Recommendation Provided To:   Intervention Detail: New Rx: 1, reason: Patient Preference  Intervention Accepted By:   Gap Closed?:    Time Spent (min): 5

## 2023-12-05 RX ORDER — IBUPROFEN 800 MG/1
800 TABLET ORAL EVERY 8 HOURS PRN
Qty: 60 TABLET | Refills: 0 | OUTPATIENT
Start: 2023-12-05

## 2023-12-05 NOTE — TELEPHONE ENCOUNTER
MD Black,    Patient calling requesting the ibuprofen 800mg refill to [facility].  Stated it had been prescribed by NP Savage.      (Noted per hx that may have been ortho md).      Noted refused by MD Black as not prescriber.  Please advise. Thanks, Wendy    LOV: 9/25/23 Wayne   Next appointment: None  Previous refill encounter(s): historical provider    Requested Prescriptions     Pending Prescriptions Disp Refills    ibuprofen (ADVIL;MOTRIN) 800 MG tablet 60 tablet 0     Sig: Take 1 tablet by mouth every 8 hours as needed for Pain     For Pharmacy Admin Tracking Only    Program: Medication Refill  CPA in place:    Recommendation Provided To:   Intervention Detail: New Rx: 1, reason: Patient Preference  Intervention Accepted By:   Gap Closed?:    Time Spent (min): 5

## 2023-12-14 ENCOUNTER — TELEPHONE (OUTPATIENT)
Age: 58
End: 2023-12-14

## 2023-12-14 NOTE — TELEPHONE ENCOUNTER
Came into the office following up request from pharmacy on the 800 MG Advil or Ibuprofen, advised that the request was sent to the provider, and that it was pending approval, advised that it was not a medication she prescribed and that it was not on her current medication list and there may be a possibly she may not refill due to that.

## 2023-12-26 ENCOUNTER — TELEPHONE (OUTPATIENT)
Age: 58
End: 2023-12-26

## 2023-12-26 NOTE — TELEPHONE ENCOUNTER
Pt called said she had a colonoscopy done and her tongue is swollen. Advised pt to go to urgent care per nurse Starr. Pt states \" I am not going to urgent care I am going to the ER\" Pt disconnected call.

## 2023-12-27 ENCOUNTER — TELEPHONE (OUTPATIENT)
Age: 58
End: 2023-12-27

## 2023-12-27 RX ORDER — AMLODIPINE BESYLATE 5 MG/1
TABLET ORAL
Qty: 90 TABLET | Refills: 2 | Status: SHIPPED | OUTPATIENT
Start: 2023-12-27

## 2023-12-27 NOTE — TELEPHONE ENCOUNTER
Patient call stating is out of amlodipine.  Needs refill.  ThanksWendy    Rx 9/13/23 for 90 + 1.  Will contact [employer] to check status.  tHanks, Wendy

## 2023-12-27 NOTE — TELEPHONE ENCOUNTER
PCP: Moose Francis MD    Last appt: 9/25/2023       No future appointments.     Requested Prescriptions     Pending Prescriptions Disp Refills    amLODIPine (NORVASC) 5 MG tablet [Pharmacy Med Name: amLODIPine Besylate Oral Tablet 5 MG] 90 tablet 0     Sig: TAKE 1 TABLET BY MOUTH ONE TIME DAILY       Prior labs and Blood pressures:  BP Readings from Last 3 Encounters:   09/25/23 120/70   06/06/23 128/64   01/17/23 114/70     Lab Results   Component Value Date/Time     01/17/2023 10:31 AM    K 4.4 01/17/2023 10:31 AM     01/17/2023 10:31 AM    CO2 31 01/17/2023 10:31 AM    BUN 13 01/17/2023 10:31 AM    GFRAA 96 05/10/2021 09:06 AM     No results found for: \"HBA1C\", \"[id number]\"  Lab Results   Component Value Date/Time    CHOL 182 01/17/2023 10:31 AM    HDL 74 01/17/2023 10:31 AM     No results found for: \"VITD3\", \"VD3RIA\"    Lab Results   Component Value Date/Time    TSH 0.54 05/10/2021 09:06 AM

## 2024-01-02 RX ORDER — ROSUVASTATIN CALCIUM 5 MG/1
5 TABLET, COATED ORAL NIGHTLY
Qty: 90 TABLET | Refills: 2 | Status: SHIPPED | OUTPATIENT
Start: 2024-01-02

## 2024-01-02 NOTE — TELEPHONE ENCOUNTER
PCP: Miguel Black MD    Last appt: 9/25/2023       No future appointments.    Requested Prescriptions     Pending Prescriptions Disp Refills    rosuvastatin (CRESTOR) 5 MG tablet [Pharmacy Med Name: Rosuvastatin Calcium Oral Tablet 5 MG] 90 tablet 0     Sig: TAKE 1 TABLET BY MOUTH ONCE DAILY AT BEDTIME       Prior labs and Blood pressures:  BP Readings from Last 3 Encounters:   09/25/23 120/70   06/06/23 128/64   01/17/23 114/70     Lab Results   Component Value Date/Time     01/17/2023 10:31 AM    K 4.4 01/17/2023 10:31 AM     01/17/2023 10:31 AM    CO2 31 01/17/2023 10:31 AM    BUN 13 01/17/2023 10:31 AM    GFRAA 96 05/10/2021 09:06 AM     No results found for: \"HBA1C\", \"[id number]\"  Lab Results   Component Value Date/Time    CHOL 182 01/17/2023 10:31 AM    HDL 74 01/17/2023 10:31 AM     No results found for: \"VITD3\", \"VD3RIA\"    Lab Results   Component Value Date/Time    TSH 0.54 05/10/2021 09:06 AM

## 2024-03-20 LAB — HBA1C MFR BLD HPLC: 5.8 %

## 2024-06-23 ENCOUNTER — HOSPITAL ENCOUNTER (EMERGENCY)
Facility: HOSPITAL | Age: 59
Discharge: HOME | End: 2024-06-23
Attending: EMERGENCY MEDICINE
Payer: COMMERCIAL

## 2024-06-23 ENCOUNTER — APPOINTMENT (OUTPATIENT)
Facility: HOSPITAL | Age: 59
End: 2024-06-23
Payer: COMMERCIAL

## 2024-06-23 VITALS
HEART RATE: 72 BPM | HEIGHT: 63 IN | OXYGEN SATURATION: 100 % | BODY MASS INDEX: 40.4 KG/M2 | DIASTOLIC BLOOD PRESSURE: 85 MMHG | SYSTOLIC BLOOD PRESSURE: 158 MMHG | WEIGHT: 228 LBS | RESPIRATION RATE: 16 BRPM | TEMPERATURE: 97.5 F

## 2024-06-23 DIAGNOSIS — K59.00 COLONIC CONSTIPATION: ICD-10-CM

## 2024-06-23 DIAGNOSIS — R10.9 LEFT SIDED ABDOMINAL PAIN: Primary | ICD-10-CM

## 2024-06-23 LAB
ANION GAP SERPL CALC-SCNC: 6 MMOL/L (ref 5–15)
APPEARANCE UR: CLEAR
BACTERIA URNS QL MICRO: NEGATIVE /HPF
BASOPHILS # BLD: 0 K/UL (ref 0–0.1)
BASOPHILS NFR BLD: 0 % (ref 0–1)
BILIRUB UR QL: NEGATIVE
BUN SERPL-MCNC: 11 MG/DL (ref 6–20)
BUN/CREAT SERPL: 15 (ref 12–20)
CALCIUM SERPL-MCNC: 9.9 MG/DL (ref 8.5–10.1)
CHLORIDE SERPL-SCNC: 103 MMOL/L (ref 97–108)
CO2 SERPL-SCNC: 33 MMOL/L (ref 21–32)
COLOR UR: ABNORMAL
CREAT SERPL-MCNC: 0.75 MG/DL (ref 0.55–1.02)
DIFFERENTIAL METHOD BLD: NORMAL
EOSINOPHIL # BLD: 0.1 K/UL (ref 0–0.4)
EOSINOPHIL NFR BLD: 2 % (ref 0–7)
EPITH CASTS URNS QL MICRO: ABNORMAL /LPF
ERYTHROCYTE [DISTWIDTH] IN BLOOD BY AUTOMATED COUNT: 13.9 % (ref 11.5–14.5)
GLUCOSE SERPL-MCNC: 85 MG/DL (ref 65–100)
GLUCOSE UR STRIP.AUTO-MCNC: NEGATIVE MG/DL
HCT VFR BLD AUTO: 41.9 % (ref 35–47)
HGB BLD-MCNC: 13.3 G/DL (ref 11.5–16)
HGB UR QL STRIP: NEGATIVE
IMM GRANULOCYTES # BLD AUTO: 0 K/UL (ref 0–0.04)
IMM GRANULOCYTES NFR BLD AUTO: 0 % (ref 0–0.5)
KETONES UR QL STRIP.AUTO: NEGATIVE MG/DL
LEUKOCYTE ESTERASE UR QL STRIP.AUTO: ABNORMAL
LYMPHOCYTES # BLD: 2.7 K/UL (ref 0.8–3.5)
LYMPHOCYTES NFR BLD: 48 % (ref 12–49)
MCH RBC QN AUTO: 26.6 PG (ref 26–34)
MCHC RBC AUTO-ENTMCNC: 31.7 G/DL (ref 30–36.5)
MCV RBC AUTO: 83.8 FL (ref 80–99)
MONOCYTES # BLD: 0.4 K/UL (ref 0–1)
MONOCYTES NFR BLD: 8 % (ref 5–13)
NEUTS SEG # BLD: 2.4 K/UL (ref 1.8–8)
NEUTS SEG NFR BLD: 42 % (ref 32–75)
NITRITE UR QL STRIP.AUTO: NEGATIVE
NRBC # BLD: 0 K/UL (ref 0–0.01)
NRBC BLD-RTO: 0 PER 100 WBC
PH UR STRIP: 6.5 (ref 5–8)
PLATELET # BLD AUTO: 297 K/UL (ref 150–400)
PMV BLD AUTO: 9.9 FL (ref 8.9–12.9)
POTASSIUM SERPL-SCNC: 4.2 MMOL/L (ref 3.5–5.1)
PROT UR STRIP-MCNC: NEGATIVE MG/DL
RBC # BLD AUTO: 5 M/UL (ref 3.8–5.2)
RBC #/AREA URNS HPF: ABNORMAL /HPF (ref 0–5)
SODIUM SERPL-SCNC: 142 MMOL/L (ref 136–145)
SP GR UR REFRACTOMETRY: <1.005 (ref 1–1.03)
SPECIMEN HOLD: NORMAL
UROBILINOGEN UR QL STRIP.AUTO: 0.2 EU/DL (ref 0.2–1)
WBC # BLD AUTO: 5.6 K/UL (ref 3.6–11)
WBC URNS QL MICRO: ABNORMAL /HPF (ref 0–4)

## 2024-06-23 PROCEDURE — 80048 BASIC METABOLIC PNL TOTAL CA: CPT

## 2024-06-23 PROCEDURE — 36415 COLL VENOUS BLD VENIPUNCTURE: CPT

## 2024-06-23 PROCEDURE — 99285 EMERGENCY DEPT VISIT HI MDM: CPT

## 2024-06-23 PROCEDURE — 6360000004 HC RX CONTRAST MEDICATION: Performed by: EMERGENCY MEDICINE

## 2024-06-23 PROCEDURE — 81001 URINALYSIS AUTO W/SCOPE: CPT

## 2024-06-23 PROCEDURE — 85025 COMPLETE CBC W/AUTO DIFF WBC: CPT

## 2024-06-23 PROCEDURE — 74177 CT ABD & PELVIS W/CONTRAST: CPT

## 2024-06-23 RX ORDER — POLYETHYLENE GLYCOL 3350 17 G/17G
POWDER, FOR SOLUTION ORAL
Qty: 500 G | Refills: 0 | Status: SHIPPED | OUTPATIENT
Start: 2024-06-23

## 2024-06-23 RX ORDER — ACETAMINOPHEN 500 MG
1000 TABLET ORAL EVERY 6 HOURS PRN
Qty: 40 TABLET | Refills: 0 | Status: SHIPPED | OUTPATIENT
Start: 2024-06-23

## 2024-06-23 RX ORDER — IBUPROFEN 800 MG/1
800 TABLET ORAL EVERY 6 HOURS PRN
Qty: 21 TABLET | Refills: 0 | Status: SHIPPED | OUTPATIENT
Start: 2024-06-23

## 2024-06-23 RX ADMIN — IOPAMIDOL 100 ML: 755 INJECTION, SOLUTION INTRAVENOUS at 13:18

## 2024-06-23 ASSESSMENT — PAIN SCALES - GENERAL: PAINLEVEL_OUTOF10: 6

## 2024-06-23 ASSESSMENT — PAIN DESCRIPTION - ORIENTATION: ORIENTATION: LEFT

## 2024-06-23 ASSESSMENT — PAIN DESCRIPTION - LOCATION: LOCATION: ABDOMEN

## 2024-06-23 ASSESSMENT — PAIN DESCRIPTION - DESCRIPTORS: DESCRIPTORS: DISCOMFORT

## 2024-06-23 NOTE — ED TRIAGE NOTES
Patient arrives with c/o LLQ abdominal pain for a week. Patient was seen at her PCP office and was started on Flexeril with no relief. Denies nausea, vomiting, diarrhea.

## 2024-06-23 NOTE — ED NOTES
Pt d/c'd home.  IV d/cd, pt taken out via w/c by RN.  Pt given d/c instructions and verbalized understanding. Declined w/c and left ambulatory in care of self in stable condition.

## 2024-06-24 NOTE — ED PROVIDER NOTES
preliminarily interpreted by the emergency physician as documented in clinical course.    Interpretation per the Radiologist below, if available at the time of this note:    CT ABDOMEN PELVIS W IV CONTRAST Additional Contrast? None   Final Result   No acute intraperitoneal process is identified.          Incidental and/or nonemergent findings are as described above.         Electronically signed by INÉS MOHAMUD           LABS:  Labs Reviewed   BASIC METABOLIC PANEL - Abnormal; Notable for the following components:       Result Value    CO2 33 (*)     All other components within normal limits   URINALYSIS WITH MICROSCOPIC - Abnormal; Notable for the following components:    Leukocyte Esterase, Urine TRACE (*)     All other components within normal limits   URINE CULTURE HOLD SAMPLE   CBC WITH AUTO DIFFERENTIAL       All other labs were within normal range or not returned as of this dictation.    EMERGENCY DEPARTMENT COURSE and DIFFERENTIAL DIAGNOSIS/MDM:   Vitals:    Vitals:    06/23/24 1100 06/23/24 1130 06/23/24 1200 06/23/24 1400   BP: (!) 155/85 (!) 154/76 (!) 166/86 (!) 158/85   Pulse: 72      Resp: 16      Temp: 97.5 °F (36.4 °C)      TempSrc: Tympanic      SpO2: 98% 97% 97% 100%   Weight: 103.4 kg (228 lb)      Height: 1.6 m (5' 3\")              Medical Decision Making  59 y.o. female presents with left sided abdominal pain. Differential includes diverticulitis, atypical pancreatitis, splenic infarction. No significant hematologic or metabolic abnormalities to explain symptoms.     CT shows no evidence of surgical pathology or spleen issue. There is some colonic fecal stasis which could be contributing so provided instructions for bowel regimen and could also be MSK in nature. Patient was recommended to take short course of scheduled NSAIDs and engage in early mobility as definitive treatment. Plan to follow up with PCP as needed and return precautions discussed for worsening or new concerning symptoms.

## 2024-09-27 RX ORDER — AMLODIPINE BESYLATE 5 MG/1
TABLET ORAL
Qty: 90 TABLET | Refills: 0 | OUTPATIENT
Start: 2024-09-27

## 2024-09-27 RX ORDER — ROSUVASTATIN CALCIUM 5 MG/1
5 TABLET, COATED ORAL NIGHTLY
Qty: 90 TABLET | Refills: 0 | OUTPATIENT
Start: 2024-09-27

## 2024-11-20 RX ORDER — AMLODIPINE BESYLATE 5 MG/1
TABLET ORAL
Qty: 90 TABLET | Refills: 0 | OUTPATIENT
Start: 2024-11-20

## 2024-11-20 RX ORDER — ROSUVASTATIN CALCIUM 5 MG/1
5 TABLET, COATED ORAL NIGHTLY
Qty: 90 TABLET | Refills: 0 | OUTPATIENT
Start: 2024-11-20

## 2024-11-26 NOTE — TELEPHONE ENCOUNTER
PCP: Mickie Cr DO    Last appt: 9/25/2023       Future Appointments   Date Time Provider Department Center   11/27/2024  3:30 PM [facility]       Requested Prescriptions     Pending Prescriptions Disp Refills    amLODIPine (NORVASC) 5 MG tablet [Pharmacy Med Name: amLODIPine Besylate Oral Tablet 5 MG] 90 tablet 0     Sig: TAKE 1 TABLET BY MOUTH ONE TIME DAILY    rosuvastatin (CRESTOR) 5 MG tablet [Pharmacy Med Name: Rosuvastatin Calcium Oral Tablet 5 MG] 90 tablet 0     Sig: Take one tablet by mouth at bedtime       Prior labs and Blood pressures:  BP Readings from Last 3 Encounters:   06/23/24 (!) 158/85   09/25/23 120/70   06/06/23 128/64     Lab Results   Component Value Date/Time     06/23/2024 12:36 PM    K 4.2 06/23/2024 12:36 PM     06/23/2024 12:36 PM    CO2 33 06/23/2024 12:36 PM    BUN 11 06/23/2024 12:36 PM    GFRAA 96 05/10/2021 09:06 AM     No results found for: \"HBA1C\", \"SMH1RALE\"  Lab Results   Component Value Date/Time    CHOL 182 01/17/2023 10:31 AM    HDL 74 01/17/2023 10:31 AM    LDL 91 01/17/2023 10:31 AM     No results found for: \"VITD3\"    Lab Results   Component Value Date/Time    TSH 0.54 05/10/2021 09:06 AM

## 2024-11-27 ENCOUNTER — OFFICE VISIT (OUTPATIENT)
Age: 59
End: 2024-11-27
Payer: COMMERCIAL

## 2024-11-27 VITALS
HEART RATE: 77 BPM | RESPIRATION RATE: 16 BRPM | HEIGHT: 63 IN | TEMPERATURE: 97.3 F | BODY MASS INDEX: 42.13 KG/M2 | SYSTOLIC BLOOD PRESSURE: 118 MMHG | WEIGHT: 237.8 LBS | OXYGEN SATURATION: 96 % | DIASTOLIC BLOOD PRESSURE: 66 MMHG

## 2024-11-27 DIAGNOSIS — Z12.31 VISIT FOR SCREENING MAMMOGRAM: ICD-10-CM

## 2024-11-27 DIAGNOSIS — I10 ESSENTIAL (PRIMARY) HYPERTENSION: Primary | ICD-10-CM

## 2024-11-27 DIAGNOSIS — R73.03 PREDIABETES: ICD-10-CM

## 2024-11-27 DIAGNOSIS — E78.00 HYPERCHOLESTEROLEMIA: ICD-10-CM

## 2024-11-27 DIAGNOSIS — Z12.4 CERVICAL CANCER SCREENING: ICD-10-CM

## 2024-11-27 PROCEDURE — 3074F SYST BP LT 130 MM HG: CPT | Performed by: FAMILY MEDICINE

## 2024-11-27 PROCEDURE — 3078F DIAST BP <80 MM HG: CPT | Performed by: FAMILY MEDICINE

## 2024-11-27 PROCEDURE — 99214 OFFICE O/P EST MOD 30 MIN: CPT | Performed by: FAMILY MEDICINE

## 2024-11-27 RX ORDER — AMLODIPINE BESYLATE 5 MG/1
5 TABLET ORAL DAILY
Qty: 90 TABLET | Refills: 3 | Status: SHIPPED | OUTPATIENT
Start: 2024-11-27

## 2024-11-27 RX ORDER — ROSUVASTATIN CALCIUM 5 MG/1
5 TABLET, COATED ORAL NIGHTLY
Qty: 90 TABLET | Refills: 3 | Status: SHIPPED | OUTPATIENT
Start: 2024-11-27

## 2024-11-27 RX ORDER — AMLODIPINE BESYLATE 5 MG/1
TABLET ORAL
Qty: 90 TABLET | Refills: 0 | OUTPATIENT
Start: 2024-11-27

## 2024-11-27 RX ORDER — ROSUVASTATIN CALCIUM 5 MG/1
5 TABLET, COATED ORAL NIGHTLY
Qty: 90 TABLET | Refills: 0 | OUTPATIENT
Start: 2024-11-27

## 2024-11-27 SDOH — ECONOMIC STABILITY: FOOD INSECURITY: WITHIN THE PAST 12 MONTHS, YOU WORRIED THAT YOUR FOOD WOULD RUN OUT BEFORE YOU GOT MONEY TO BUY MORE.: NEVER TRUE

## 2024-11-27 SDOH — ECONOMIC STABILITY: FOOD INSECURITY: WITHIN THE PAST 12 MONTHS, THE FOOD YOU BOUGHT JUST DIDN'T LAST AND YOU DIDN'T HAVE MONEY TO GET MORE.: NEVER TRUE

## 2024-11-27 SDOH — ECONOMIC STABILITY: INCOME INSECURITY: HOW HARD IS IT FOR YOU TO PAY FOR THE VERY BASICS LIKE FOOD, HOUSING, MEDICAL CARE, AND HEATING?: NOT VERY HARD

## 2024-11-27 ASSESSMENT — ENCOUNTER SYMPTOMS
DIARRHEA: 0
COUGH: 0
ABDOMINAL PAIN: 0
NAUSEA: 0
VOMITING: 0
CONSTIPATION: 0
WHEEZING: 0
SHORTNESS OF BREATH: 0
CHEST TIGHTNESS: 0

## 2024-11-27 ASSESSMENT — PATIENT HEALTH QUESTIONNAIRE - PHQ9
SUM OF ALL RESPONSES TO PHQ QUESTIONS 1-9: 0
2. FEELING DOWN, DEPRESSED OR HOPELESS: NOT AT ALL
SUM OF ALL RESPONSES TO PHQ9 QUESTIONS 1 & 2: 0
SUM OF ALL RESPONSES TO PHQ QUESTIONS 1-9: 0
1. LITTLE INTEREST OR PLEASURE IN DOING THINGS: NOT AT ALL

## 2024-11-27 NOTE — PROGRESS NOTES
Chief Complaint   Patient presents with    Follow-up     \"Have you been to the ER, urgent care clinic since your last visit?  Hospitalized since your last visit?\"    Yes. 6/23/24- SPT- abdominal pain.     “Have you seen or consulted any other health care providers outside of [facility] since your last visit?”    NO        “Have you had a pap smear?”    NO    No cervical cancer screening on file              Financial Resource Strain: Low Risk  (11/27/2024)    Overall Financial Resource Strain (CARDIA)     Difficulty of Paying Living Expenses: Not very hard      Food Insecurity: No Food Insecurity (11/27/2024)    Hunger Vital Sign     Worried About Running Out of Food in the Last Year: Never true     Ran Out of Food in the Last Year: Never true            11/27/2024     1:49 PM   PHQ-9    Little interest or pleasure in doing things 0   Feeling down, depressed, or hopeless 0   PHQ-2 Score 0   PHQ-9 Total Score 0       Health Maintenance Due   Topic Date Due    Pneumococcal 0-64 years Vaccine (1 of 2 - PCV) Never done    HIV screen  Never done    Hepatitis C screen  Never done    Cervical cancer screen  Never done    Shingles vaccine (2 of 2) 08/21/2020    DTaP/Tdap/Td vaccine (2 - Td or Tdap) 01/18/2021    Respiratory Syncytial Virus (RSV) Pregnant or age 60 yrs+ (1 - Risk 60-74 years 1-dose series) Never done    A1C test (Diabetic or Prediabetic)  01/17/2024    Lipids  01/17/2024    Depression Screen  06/06/2024    Flu vaccine (1) 08/01/2024    COVID-19 Vaccine (7 - 2023-24 season) 09/01/2024

## 2024-11-28 LAB
ALBUMIN SERPL-MCNC: 3.7 G/DL (ref 3.5–5)
ALBUMIN/GLOB SERPL: 1.2 (ref 1.1–2.2)
ALP SERPL-CCNC: 121 U/L (ref 45–117)
ALT SERPL-CCNC: 25 U/L (ref 12–78)
ANION GAP SERPL CALC-SCNC: 5 MMOL/L (ref 2–12)
AST SERPL-CCNC: 23 U/L (ref 15–37)
BILIRUB SERPL-MCNC: 0.6 MG/DL (ref 0.2–1)
BUN SERPL-MCNC: 14 MG/DL (ref 6–20)
BUN/CREAT SERPL: 17 (ref 12–20)
CALCIUM SERPL-MCNC: 10 MG/DL (ref 8.5–10.1)
CHLORIDE SERPL-SCNC: 108 MMOL/L (ref 97–108)
CHOLEST SERPL-MCNC: 145 MG/DL
CO2 SERPL-SCNC: 29 MMOL/L (ref 21–32)
CREAT SERPL-MCNC: 0.84 MG/DL (ref 0.55–1.02)
ERYTHROCYTE [DISTWIDTH] IN BLOOD BY AUTOMATED COUNT: 13.6 % (ref 11.5–14.5)
EST. AVERAGE GLUCOSE BLD GHB EST-MCNC: 111 MG/DL
GLOBULIN SER CALC-MCNC: 3.2 G/DL (ref 2–4)
GLUCOSE SERPL-MCNC: 105 MG/DL (ref 65–100)
HBA1C MFR BLD: 5.5 % (ref 4–5.6)
HCT VFR BLD AUTO: 37.4 % (ref 35–47)
HDLC SERPL-MCNC: 73 MG/DL
HDLC SERPL: 2 (ref 0–5)
HGB BLD-MCNC: 11.9 G/DL (ref 11.5–16)
LDLC SERPL CALC-MCNC: 64.2 MG/DL (ref 0–100)
MCH RBC QN AUTO: 27.4 PG (ref 26–34)
MCHC RBC AUTO-ENTMCNC: 31.8 G/DL (ref 30–36.5)
MCV RBC AUTO: 86.2 FL (ref 80–99)
NRBC # BLD: 0 K/UL (ref 0–0.01)
NRBC BLD-RTO: 0 PER 100 WBC
PLATELET # BLD AUTO: 318 K/UL (ref 150–400)
PMV BLD AUTO: 10.1 FL (ref 8.9–12.9)
POTASSIUM SERPL-SCNC: 4 MMOL/L (ref 3.5–5.1)
PROT SERPL-MCNC: 6.9 G/DL (ref 6.4–8.2)
RBC # BLD AUTO: 4.34 M/UL (ref 3.8–5.2)
SODIUM SERPL-SCNC: 142 MMOL/L (ref 136–145)
TRIGL SERPL-MCNC: 39 MG/DL
VLDLC SERPL CALC-MCNC: 7.8 MG/DL
WBC # BLD AUTO: 7.1 K/UL (ref 3.6–11)

## 2024-12-30 ENCOUNTER — OFFICE VISIT (OUTPATIENT)
Age: 59
End: 2024-12-30

## 2024-12-30 VITALS
WEIGHT: 230 LBS | SYSTOLIC BLOOD PRESSURE: 123 MMHG | TEMPERATURE: 98 F | BODY MASS INDEX: 40.74 KG/M2 | DIASTOLIC BLOOD PRESSURE: 80 MMHG | OXYGEN SATURATION: 97 % | HEART RATE: 92 BPM

## 2024-12-30 DIAGNOSIS — R19.7 DIARRHEA, UNSPECIFIED TYPE: Primary | ICD-10-CM

## 2024-12-30 RX ORDER — DICYCLOMINE HYDROCHLORIDE 10 MG/1
10 CAPSULE ORAL
Qty: 28 CAPSULE | Refills: 0 | Status: SHIPPED
Start: 2024-12-30 | End: 2025-01-03

## 2024-12-30 ASSESSMENT — ENCOUNTER SYMPTOMS
VOMITING: 0
NAUSEA: 0
COUGH: 0
DIARRHEA: 1
ABDOMINAL PAIN: 1

## 2024-12-30 NOTE — PROGRESS NOTES
cervical adenopathy.   Neurological:      General: No focal deficit present.      Mental Status: She is alert and oriented to person, place, and time.   Psychiatric:         Mood and Affect: Mood normal.         Behavior: Behavior normal.         Assessment & Plan     There are no diagnoses linked to this encounter.    No visits with results within 1 Day(s) from this visit.   Latest known visit with results is:   Office Visit on 11/27/2024   Component Date Value Ref Range Status    Cholesterol, Total 11/27/2024 145  <200 MG/DL Final    Triglycerides 11/27/2024 39  <150 MG/DL Final    Based on NCEP-ATP III:  Triglycerides <150 mg/dL  is considered normal, 150-199 mg/dL  borderline high,  200-499 mg/dL high and  greater than or equal to 500 mg/dL very high.    HDL 11/27/2024 73  MG/DL Final    Based on NCEP ATP III, HDL Cholesterol <40 mg/dL is considered low and >60 mg/dL is elevated.    LDL Cholesterol 11/27/2024 64.2  0 - 100 MG/DL Final    Comment: Based on the NCEP-ATP: LDL-C concentrations are considered  optimal <100 mg/dL,  near optimal/above Normal 100-129 mg/dL  Borderline High: 130-159, High: 160-189 mg/dL  Very High: Greater than or equal to 190 mg/dL      VLDL Cholesterol Calculated 11/27/2024 7.8  MG/DL Final    Chol/HDL Ratio 11/27/2024 2.0  0.0 - 5.0   Final    Hemoglobin A1C 11/27/2024 5.5  4.0 - 5.6 % Final    Comment: (NOTE)  HbA1C Interpretive Ranges  <5.7              Normal  5.7 - 6.4         Consider Prediabetes  >6.5              Consider Diabetes      Estimated Avg Glucose 11/27/2024 111  mg/dL Final    Sodium 11/27/2024 142  136 - 145 mmol/L Final    Potassium 11/27/2024 4.0  3.5 - 5.1 mmol/L Final    Chloride 11/27/2024 108  97 - 108 mmol/L Final    CO2 11/27/2024 29  21 - 32 mmol/L Final    Anion Gap 11/27/2024 5  2 - 12 mmol/L Final    PLEASE NOTE NEW REFERENCE RANGE    Glucose 11/27/2024 105 (H)  65 - 100 mg/dL Final    BUN 11/27/2024 14  6 - 20 MG/DL Final    Creatinine 11/27/2024 0.84

## 2024-12-30 NOTE — PATIENT INSTRUCTIONS
Patient will drink plenty of water, BRAT diet, over-the-counter Imodium and follow-up with PCP as needed

## 2025-01-03 ENCOUNTER — OFFICE VISIT (OUTPATIENT)
Age: 60
End: 2025-01-03
Payer: COMMERCIAL

## 2025-01-03 VITALS
TEMPERATURE: 97.1 F | DIASTOLIC BLOOD PRESSURE: 72 MMHG | RESPIRATION RATE: 18 BRPM | WEIGHT: 233.2 LBS | OXYGEN SATURATION: 96 % | SYSTOLIC BLOOD PRESSURE: 128 MMHG | HEART RATE: 78 BPM | BODY MASS INDEX: 41.32 KG/M2 | HEIGHT: 63 IN

## 2025-01-03 DIAGNOSIS — A08.4 VIRAL GASTROENTERITIS: Primary | ICD-10-CM

## 2025-01-03 PROCEDURE — 3078F DIAST BP <80 MM HG: CPT | Performed by: FAMILY MEDICINE

## 2025-01-03 PROCEDURE — 3074F SYST BP LT 130 MM HG: CPT | Performed by: FAMILY MEDICINE

## 2025-01-03 PROCEDURE — 99213 OFFICE O/P EST LOW 20 MIN: CPT | Performed by: FAMILY MEDICINE

## 2025-01-03 SDOH — ECONOMIC STABILITY: FOOD INSECURITY: WITHIN THE PAST 12 MONTHS, THE FOOD YOU BOUGHT JUST DIDN'T LAST AND YOU DIDN'T HAVE MONEY TO GET MORE.: NEVER TRUE

## 2025-01-03 SDOH — ECONOMIC STABILITY: FOOD INSECURITY: WITHIN THE PAST 12 MONTHS, YOU WORRIED THAT YOUR FOOD WOULD RUN OUT BEFORE YOU GOT MONEY TO BUY MORE.: NEVER TRUE

## 2025-01-03 SDOH — ECONOMIC STABILITY: INCOME INSECURITY: HOW HARD IS IT FOR YOU TO PAY FOR THE VERY BASICS LIKE FOOD, HOUSING, MEDICAL CARE, AND HEATING?: NOT VERY HARD

## 2025-01-03 ASSESSMENT — PATIENT HEALTH QUESTIONNAIRE - PHQ9
SUM OF ALL RESPONSES TO PHQ QUESTIONS 1-9: 0
SUM OF ALL RESPONSES TO PHQ9 QUESTIONS 1 & 2: 0
1. LITTLE INTEREST OR PLEASURE IN DOING THINGS: NOT AT ALL
2. FEELING DOWN, DEPRESSED OR HOPELESS: NOT AT ALL
SUM OF ALL RESPONSES TO PHQ QUESTIONS 1-9: 0

## 2025-01-03 NOTE — PROGRESS NOTES
Chief Complaint   Patient presents with    Abdominal Pain     About a week; 7/10     Diarrhea     Comes and goes ; about a week      \"Have you been to the ER, urgent care clinic since your last visit?  Hospitalized since your last visit?\"    Yes. Urgent care on 12/30/24- abdominal pain    “Have you seen or consulted any other health care providers outside of [facility] since your last visit?”    NO       Financial Resource Strain: Low Risk  (1/3/2025)    Overall Financial Resource Strain (CARDIA)     Difficulty of Paying Living Expenses: Not very hard      Food Insecurity: No Food Insecurity (1/3/2025)    Hunger Vital Sign     Worried About Running Out of Food in the Last Year: Never true     Ran Out of Food in the Last Year: Never true            1/3/2025     9:30 AM   PHQ-9    Little interest or pleasure in doing things 0   Feeling down, depressed, or hopeless 0   PHQ-2 Score 0   PHQ-9 Total Score 0       Health Maintenance Due   Topic Date Due    Pneumococcal 0-64 years Vaccine (1 of 2 - PCV) Never done    Shingles vaccine (2 of 2) 08/21/2020    DTaP/Tdap/Td vaccine (2 - Td or Tdap) 01/18/2021    Respiratory Syncytial Virus (RSV) Pregnant or age 60 yrs+ (1 - Risk 60-74 years 1-dose series) Never done    Flu vaccine (1) 08/01/2024    COVID-19 Vaccine (7 - 2023-24 season) 09/01/2024             
drink it, followed by 3 capfuls twice daily for the next week and follow up with your primary care physician 500 g 0    fexofenadine (ALLEGRA) 180 MG tablet       aspirin 81 MG chewable tablet Take 1 tablet by mouth daily      TURMERIC PO Take by mouth daily      EPINEPHrine (EPIPEN) 0.3 MG/0.3ML SOAJ injection ceived the following from [facility]: Outside name: EPINEPHrine (EPIPEN) 0.3 mg/0.3 mL injection      famotidine (PEPCID) 20 MG tablet Take 1 tablet by mouth 2 times daily as needed      levocetirizine (XYZAL) 5 MG tablet Take by mouth      lidocaine (LIDODERM) 5 % Apply patch to the affected area for 12 hours a day and remove for 12 hours a day.      dicyclomine (BENTYL) 10 MG capsule Take 1 capsule by mouth 4 times daily (before meals and nightly) for 7 days (Patient not taking: Reported on 1/3/2025) 28 capsule 0    vitamin D 25 MCG (1000 UT) CAPS Take 2 capsules by mouth daily (Patient not taking: Reported on 12/30/2024)       No current facility-administered medications for this visit.       Allergies   Allergen Reactions    Latex Swelling     Swelling in face when latex gloves were used at dentist office    Hydrocodone-Acetaminophen Itching    Oxycodone Swelling     Swelling of hands    Red Dye #40 (Allura Red) Itching and Swelling     #9    Shellfish Allergy Swelling     shrimp    Tramadol-Acetaminophen Swelling     Facial and mouth    Carrot Itching    Codeine Itching    Cyclobenzaprine Itching    Hydromorphone Itching and Nausea Only    Naproxen Rash    Other Itching     Peas, raw broccoli, raw cauliflower, croutons    Penicillins Rash    Strawberry Itching    Tapentadol Itching    Tramadol Hives    Clarithromycin Itching and Nausea Only         OBJECTIVE    Visit Vitals  /72 (Site: Left Upper Arm, Position: Sitting, Cuff Size: Large Adult)   Pulse 78   Temp 97.1 °F (36.2 °C) (Temporal)   Resp 18   Ht 1.6 m (5' 3\")   Wt 105.8 kg (233 lb 3.2 oz)   SpO2 96%   BMI 41.31 kg/m²

## 2025-02-25 LAB — HBA1C MFR BLD HPLC: 0 %

## 2025-03-13 ENCOUNTER — TRANSCRIBE ORDERS (OUTPATIENT)
Facility: HOSPITAL | Age: 60
End: 2025-03-13

## 2025-03-13 DIAGNOSIS — E04.1 THYROID NODULE: ICD-10-CM

## 2025-03-13 DIAGNOSIS — Z78.9 NON-SMOKER: ICD-10-CM

## 2025-03-13 DIAGNOSIS — Z00.8 HEALTH EXAMINATION IN POPULATION SURVEY: Primary | ICD-10-CM

## 2025-03-25 ENCOUNTER — HOSPITAL ENCOUNTER (OUTPATIENT)
Facility: HOSPITAL | Age: 60
Discharge: HOME | End: 2025-03-28
Attending: STUDENT IN AN ORGANIZED HEALTH CARE EDUCATION/TRAINING PROGRAM
Payer: COMMERCIAL

## 2025-03-25 DIAGNOSIS — E04.1 THYROID NODULE: ICD-10-CM

## 2025-03-25 DIAGNOSIS — Z78.9 NON-SMOKER: ICD-10-CM

## 2025-03-25 DIAGNOSIS — Z00.8 HEALTH EXAMINATION IN POPULATION SURVEY: ICD-10-CM

## 2025-03-25 PROCEDURE — 76536 US EXAM OF HEAD AND NECK: CPT

## 2025-05-27 ENCOUNTER — OFFICE VISIT (OUTPATIENT)
Age: 60
End: 2025-05-27
Payer: COMMERCIAL

## 2025-05-27 VITALS
SYSTOLIC BLOOD PRESSURE: 130 MMHG | WEIGHT: 232 LBS | DIASTOLIC BLOOD PRESSURE: 74 MMHG | HEIGHT: 63 IN | TEMPERATURE: 97.1 F | HEART RATE: 75 BPM | OXYGEN SATURATION: 97 % | RESPIRATION RATE: 18 BRPM | BODY MASS INDEX: 41.11 KG/M2

## 2025-05-27 DIAGNOSIS — G89.29 CHRONIC LEFT HIP PAIN: Primary | ICD-10-CM

## 2025-05-27 DIAGNOSIS — M25.552 CHRONIC LEFT HIP PAIN: Primary | ICD-10-CM

## 2025-05-27 DIAGNOSIS — Z12.4 CERVICAL CANCER SCREENING: ICD-10-CM

## 2025-05-27 DIAGNOSIS — Z12.31 VISIT FOR SCREENING MAMMOGRAM: ICD-10-CM

## 2025-05-27 DIAGNOSIS — I10 ESSENTIAL (PRIMARY) HYPERTENSION: ICD-10-CM

## 2025-05-27 DIAGNOSIS — E78.00 HYPERCHOLESTEROLEMIA: ICD-10-CM

## 2025-05-27 PROCEDURE — 3078F DIAST BP <80 MM HG: CPT | Performed by: FAMILY MEDICINE

## 2025-05-27 PROCEDURE — 99214 OFFICE O/P EST MOD 30 MIN: CPT | Performed by: FAMILY MEDICINE

## 2025-05-27 PROCEDURE — 3075F SYST BP GE 130 - 139MM HG: CPT | Performed by: FAMILY MEDICINE

## 2025-05-27 RX ORDER — FAMOTIDINE 20 MG/1
TABLET ORAL
Qty: 250 TABLET | Refills: 0 | OUTPATIENT
Start: 2025-05-27

## 2025-05-27 SDOH — ECONOMIC STABILITY: FOOD INSECURITY: WITHIN THE PAST 12 MONTHS, THE FOOD YOU BOUGHT JUST DIDN'T LAST AND YOU DIDN'T HAVE MONEY TO GET MORE.: NEVER TRUE

## 2025-05-27 SDOH — ECONOMIC STABILITY: FOOD INSECURITY: WITHIN THE PAST 12 MONTHS, YOU WORRIED THAT YOUR FOOD WOULD RUN OUT BEFORE YOU GOT MONEY TO BUY MORE.: NEVER TRUE

## 2025-05-27 NOTE — PROGRESS NOTES
Chief Complaint   Patient presents with    Follow-up    Hip Pain     Left hip pain; 5/10      \"Have you been to the ER, urgent care clinic since your last visit?  Hospitalized since your last visit?\"    NO    “Have you seen or consulted any other health care providers outside of [facility] since your last visit?”    NO       Have you had a mammogram?”   NO    Date of last Mammogram: 4/25/2023        Financial Resource Strain: Low Risk  (1/3/2025)    Overall Financial Resource Strain (CARDIA)     Difficulty of Paying Living Expenses: Not very hard      Food Insecurity: No Food Insecurity (5/27/2025)    Hunger Vital Sign     Worried About Running Out of Food in the Last Year: Never true     Ran Out of Food in the Last Year: Never true            1/3/2025     9:30 AM   PHQ-9    Little interest or pleasure in doing things 0   Feeling down, depressed, or hopeless 0   PHQ-2 Score 0   PHQ-9 Total Score 0       Health Maintenance Due   Topic Date Due    Pneumococcal 50+ years Vaccine (1 of 2 - PCV) Never done    Shingles vaccine (2 of 2) 08/21/2020    DTaP/Tdap/Td vaccine (2 - Td or Tdap) 01/18/2021    Respiratory Syncytial Virus (RSV) Pregnant or age 60 yrs+ (1 - Risk 60-74 years 1-dose series) Never done    COVID-19 Vaccine (7 - 2024-25 season) 09/01/2024    Breast cancer screen  04/25/2025

## 2025-05-27 NOTE — PROGRESS NOTES
Patient Name: Madina Germain   MRN: [id number]    ASSESSMENT AND PLAN  Madina Germain is a 60 y.o. female who presents today for:    Assessment & Plan  Chronic left hip pain   Chronic, not at goal (unstable),  refer to orthopedics.    Orders:    [facility] Janes Knight MD, Orthopedic Surgery (hip), [place] ([address])    Essential (primary) hypertension   Chronic, at goal (stable), continue current treatment plan         Visit for screening mammogram     Orders:    [facility]IA DIGITAL SCREEN BILATERAL; Future    Cervical cancer screening     Orders:    [facility] Rosa Roca MD, Ob-Gyn, [place]  ([address])    Hypercholesterolemia   Chronic, at goal (stable), continue current treatment plan           No orders of the defined types were placed in this encounter.       There are no discontinued medications.    Return in about 6 months (around 11/27/2025) for HTN.      SUBJECTIVE  Madina Germain is a 60 y.o. female who presents with the following:     Reports 6-month history of intermittent left hip.  Tends to be worse when she sits down for prolonged period of time.  Usually occurs in her left buttock pain and then radiates down the side  Of her thigh.  She went to urgent care in February where they did a lumbar spine x-ray did that was overall normal.  She did use Robaxin temporarily which did helped.     BP Readings from Last 3 Encounters:   05/27/25 130/74   01/03/25 128/72   12/30/24 123/80     Wt Readings from Last 3 Encounters:   05/27/25 105.2 kg (232 lb)   04/07/25 105.7 kg (233 lb)   03/20/25 105.7 kg (233 lb)       All other ROS were reviewed and are negative except as discussed in HPI.  The patient's medications, allergies, past medical history, surgical history, family history and social history were reviewed and updated where appropriate.    Current Outpatient Medications   Medication Sig Dispense Refill    rosuvastatin (CRESTOR) 5 MG tablet Take 1 tablet by mouth nightly 90 tablet 3    amLODIPine (NORVASC) 5

## 2025-07-14 ENCOUNTER — HOSPITAL ENCOUNTER (OUTPATIENT)
Facility: HOSPITAL | Age: 60
Discharge: HOME | End: 2025-07-17
Payer: COMMERCIAL

## 2025-07-14 DIAGNOSIS — Z12.31 VISIT FOR SCREENING MAMMOGRAM: ICD-10-CM

## 2025-07-14 PROCEDURE — 77063 BREAST TOMOSYNTHESIS BI: CPT
